# Patient Record
Sex: FEMALE | Race: BLACK OR AFRICAN AMERICAN | Employment: FULL TIME | URBAN - METROPOLITAN AREA
[De-identification: names, ages, dates, MRNs, and addresses within clinical notes are randomized per-mention and may not be internally consistent; named-entity substitution may affect disease eponyms.]

---

## 2017-01-26 ENCOUNTER — OFFICE VISIT (OUTPATIENT)
Dept: FAMILY MEDICINE CLINIC | Age: 37
End: 2017-01-26

## 2017-01-26 VITALS
TEMPERATURE: 98 F | HEIGHT: 68 IN | HEART RATE: 62 BPM | WEIGHT: 180 LBS | BODY MASS INDEX: 27.28 KG/M2 | OXYGEN SATURATION: 97 % | SYSTOLIC BLOOD PRESSURE: 102 MMHG | RESPIRATION RATE: 16 BRPM | DIASTOLIC BLOOD PRESSURE: 72 MMHG

## 2017-01-26 DIAGNOSIS — O03.9 MISCARRIAGE: Primary | ICD-10-CM

## 2017-01-26 RX ORDER — HYDROCODONE BITARTRATE AND ACETAMINOPHEN 5; 325 MG/1; MG/1
TABLET ORAL
COMMUNITY
Start: 2017-01-23 | End: 2017-12-04

## 2017-01-26 NOTE — LETTER
NOTIFICATION RETURN TO WORK / SCHOOL 
 
1/26/2017 2:31 PM 
 
Ms. iBb Hernandez [address] [place] [id number] To Whom It May Concern: 
 
Bib Hernandez is currently under the care of [address] [place] [address]. She will return to work/school on: 1/30/2017 If there are questions or concerns please have the patient contact our office. Sincerely, Trent Duran MD

## 2017-01-26 NOTE — PATIENT INSTRUCTIONS
Miscarriage: Care Instructions  Your Care Instructions    The loss of a pregnancy can be very hard. You may wonder why it happened or blame yourself. Miscarriages are common and are not caused by exercise, stress, or sex. Most happen because the fertilized egg in the uterus does not develop normally. There is no treatment that can stop a miscarriage. As long as you do not have heavy blood loss, fever, weakness, or other signs of infection, you can let a miscarriage follow its own course. This can take several days. Your body will recover over the next several weeks. Having a miscarriage does not mean you cannot have a normal pregnancy in the future. The doctor has checked you carefully, but problems can develop later. If you notice any problems or new symptoms, get medical treatment right away. Follow-up care is a key part of your treatment and safety. Be sure to make and go to all appointments, and call your doctor if you are having problems. It's also a good idea to know your test results and keep a list of the medicines you take. How can you care for yourself at home? · You will probably have some vaginal bleeding for 1 to 2 weeks. It may be similar to or slightly heavier than a normal period. The bleeding should get lighter after a week. Use pads instead of tampons. You may use tampons during your next period, which should start in 3 to 6 weeks. · Take an over-the-counter pain medicine, such as acetaminophen (Tylenol), ibuprofen (Advil, Motrin), or naproxen (Aleve) for cramps. Read and follow all instructions on the label. You may have cramps for several days after the miscarriage. · Do not take two or more pain medicines at the same time unless the doctor told you to. Many pain medicines have acetaminophen, which is Tylenol. Too much acetaminophen (Tylenol) can be harmful. · Use a clear container to save any tissue that you pass. Take it to your doctor's office as soon as you can.   · Do not have sex until the bleeding stops. · You may return to your normal activities if you feel well enough to do so. But you should avoid heavy exercise until the bleeding stops. · If you plan to get pregnant again, check with your doctor. Most doctors suggest waiting until you have had at least one normal period before you try to get pregnant. · If you do not want to get pregnant, ask your doctor about birth control. You can get pregnant again before your next period starts if you are not using birth control. · You may be low in iron because of blood loss. Eat a balanced diet that is high in iron and vitamin C. Foods rich in iron include red meat, shellfish, eggs, beans, and leafy green vegetables. Foods high in vitamin C include citrus fruits, tomatoes, and broccoli. Talk to your doctor about whether you need to take iron pills or a multivitamin. · The loss of a pregnancy can be very hard. You may wonder why it happened and blame yourself. Talking to family members, friends, a counselor, or your doctor may help you cope with your loss. When should you call for help? Call 911 anytime you think you may need emergency care. For example, call if:  · You have sudden, severe pain in your belly or pelvis. · You passed out (lost consciousness). · You have severe vaginal bleeding. Call your doctor now or seek immediate medical care if:  · You are dizzy or lightheaded, or you feel like you may faint. · You have new or increased pain in your belly or pelvis. · Your vaginal bleeding is getting worse. · You have increased pain in the vaginal area. · You have a fever. Watch closely for changes in your health, and be sure to contact your doctor if:  · You have new or worse vaginal discharge. · You do not get better as expected. Where can you learn more? Go to http://inocencia-[place]ank.info/. Enter E802 in the search box to learn more about \"Miscarriage: Care Instructions. \"  Current as of:  May 30, 2016  Content Version: 11.1  © 4183-7297 [employer]. Care instructions adapted under license by [employer] (which disclaims liability or warranty for this information). If you have questions about a medical condition or this instruction, always ask your healthcare professional. [address] any warranty or liability for your use of this information.

## 2017-01-26 NOTE — MR AVS SNAPSHOT
Visit Information Date & Time Provider Department Dept. Phone Encounter #  
 1/26/2017  2:00 PM [facility], [address] [id number] Upcoming Health Maintenance Date Due DTaP/Tdap/Td series (1 - Tdap) 6/4/2003 PAP AKA CERVICAL CYTOLOGY 6/4/2003 INFLUENZA AGE 9 TO ADULT 8/1/2016 Allergies as of 1/26/2017  Review Complete On: 1/26/2017 By: Keith Gonsalez LPN No Known Allergies Current Immunizations  Reviewed on 10/11/2010 Name Date Influenza Vaccine Split 10/11/2010 Not reviewed this visit You Were Diagnosed With   
  
 Codes Comments Miscarriage    -  Primary ICD-10-CM: O36.9 ICD-9-CM: 634.90 Vitals BP Pulse Temp Resp Height(growth percentile) Weight(growth percentile) 102/72 (BP 1 Location: Left arm, BP Patient Position: Sitting) 62 98 °F (36.7 °C) (Oral) 16 5' 8\" (1.727 m) 180 lb (81.6 kg) LMP SpO2 BMI OB Status Smoking Status 11/21/2016 97% 27.37 kg/m2 Having regular periods Never Smoker BMI and BSA Data Body Mass Index Body Surface Area  
 27.37 kg/m 2 1.98 m 2 Preferred Pharmacy Pharmacy Name Phone [facility] [place] [phone] Your Updated Medication List  
  
   
This list is accurate as of: 1/26/17  2:39 PM.  Always use your most recent med list.  
  
  
  
  
 HYDROcodone-acetaminophen 5-325 mg per tablet Commonly known as:  Christianson Goldie Take 1 Tab by Mouth Every 4 Hours As Needed. ibuprofen 800 mg tablet Commonly known as:  MOTRIN Take 1 Tab by mouth every eight (8) hours as needed for Pain. To-Do List   
 01/26/2017 Lab:  TOTAL HCG, QT.   
  
 02/10/2017 Imaging:  US TRANSVAGINAL Patient Instructions Miscarriage: Care Instructions Your Care Instructions The loss of a pregnancy can be very hard.  You may wonder why it happened or blame yourself. Miscarriages are common and are not caused by exercise, stress, or sex. Most happen because the fertilized egg in the uterus does not develop normally. There is no treatment that can stop a miscarriage. As long as you do not have heavy blood loss, fever, weakness, or other signs of infection, you can let a miscarriage follow its own course. This can take several days. Your body will recover over the next several weeks. Having a miscarriage does not mean you cannot have a normal pregnancy in the future. The doctor has checked you carefully, but problems can develop later. If you notice any problems or new symptoms, get medical treatment right away. Follow-up care is a key part of your treatment and safety. Be sure to make and go to all appointments, and call your doctor if you are having problems. It's also a good idea to know your test results and keep a list of the medicines you take. How can you care for yourself at home? · You will probably have some vaginal bleeding for 1 to 2 weeks. It may be similar to or slightly heavier than a normal period. The bleeding should get lighter after a week. Use pads instead of tampons. You may use tampons during your next period, which should start in 3 to 6 weeks. · Take an over-the-counter pain medicine, such as acetaminophen (Tylenol), ibuprofen (Advil, Motrin), or naproxen (Aleve) for cramps. Read and follow all instructions on the label. You may have cramps for several days after the miscarriage. · Do not take two or more pain medicines at the same time unless the doctor told you to. Many pain medicines have acetaminophen, which is Tylenol. Too much acetaminophen (Tylenol) can be harmful. · Use a clear container to save any tissue that you pass. Take it to your doctor's office as soon as you can. · Do not have sex until the bleeding stops.  
· You may return to your normal activities if you feel well enough to do so. But you should avoid heavy exercise until the bleeding stops. · If you plan to get pregnant again, check with your doctor. Most doctors suggest waiting until you have had at least one normal period before you try to get pregnant. · If you do not want to get pregnant, ask your doctor about birth control. You can get pregnant again before your next period starts if you are not using birth control. · You may be low in iron because of blood loss. Eat a balanced diet that is high in iron and vitamin C. Foods rich in iron include red meat, shellfish, eggs, beans, and leafy green vegetables. Foods high in vitamin C include citrus fruits, tomatoes, and broccoli. Talk to your doctor about whether you need to take iron pills or a multivitamin. · The loss of a pregnancy can be very hard. You may wonder why it happened and blame yourself. Talking to family members, friends, a counselor, or your doctor may help you cope with your loss. When should you call for help? Call 911 anytime you think you may need emergency care. For example, call if: 
· You have sudden, severe pain in your belly or pelvis. · You passed out (lost consciousness). · You have severe vaginal bleeding. Call your doctor now or seek immediate medical care if: 
· You are dizzy or lightheaded, or you feel like you may faint. · You have new or increased pain in your belly or pelvis. · Your vaginal bleeding is getting worse. · You have increased pain in the vaginal area. · You have a fever. Watch closely for changes in your health, and be sure to contact your doctor if: 
· You have new or worse vaginal discharge. · You do not get better as expected. Where can you learn more? Go to http://inocencia-[place]ank.info/. Enter E802 in the search box to learn more about \"Miscarriage: Care Instructions. \" Current as of: May 30, 2016 Content Version: 11.1 © 9125-2407 [employer].  Care instructions adapted under license by [address] (which disclaims liability or warranty for this information). If you have questions about a medical condition or this instruction, always ask your healthcare professional. [address] any warranty or liability for your use of this information. Introducing [facility]! [employer] introduces StayNTouch patient portal. Now you can access parts of your medical record, email your doctor's office, and request medication refills online. 1. In your internet browser, go to [email or web address] 2. Click on the First Time User? Click Here link in the Sign In box. You will see the New Member Sign Up page. 3. Enter your StayNTouch Access Code exactly as it appears below. You will not need to use this code after youve completed the sign-up process. If you do not sign up before the expiration date, you must request a new code. · StayNTouch Access Code: [email or web address] Expires: 4/26/2017  2:39 PM 
 
4. Enter the last four digits of your Social Security Number (xxxx) and Date of Birth (mm/dd/yyyy) as indicated and click Submit. You will be taken to the next sign-up page. 5. Create a StayNTouch ID. This will be your StayNTouch login ID and cannot be changed, so think of one that is secure and easy to remember. 6. Create a StayNTouch password. You can change your password at any time. 7. Enter your Password Reset Question and Answer. This can be used at a later time if you forget your password. 8. Enter your e-mail address. You will receive e-mail notification when new information is available in [address] 9. Click Sign Up. You can now view and download portions of your medical record. 10. Click the Download Summary menu link to download a portable copy of your medical information. If you have questions, please visit the Frequently Asked Questions section of the StayNTouch website.  Remember, StayNTouch is NOT to be used for urgent needs. For medical emergencies, dial 911. Now available from your iPhone and Android! Please provide this summary of care documentation to your next provider. Your primary care clinician is listed as [address]. If you have any questions after today's visit, please call [phone].

## 2017-01-26 NOTE — PROGRESS NOTES
1. Have you been to the ER, urgent care clinic since your last visit? Hospitalized since your last visit? Yes Where: [facility] emergency room    2. Have you seen or consulted any other health care providers outside of the [address] [place] [address] since your last visit? Include any pap smears or colon screening.  No

## 2017-01-26 NOTE — PROGRESS NOTES
HISTORY OF PRESENT ILLNESS  Valerie Timmons is a 31 y.o. female. HPI  Patient is here today for follow up on: Miscarriage    Miscarriage: Pt found out about 2 weeks ago she was pregnant. She is now G 10;  She developed some menstrual like cramping and some vaginal spotting on last Saturday and presented to the ED. She was found to have what appeared to be a dropping Quantitative Bhcg . On US there was a sac seen but no fetal pole. A follow up US is advised. Today she feels well but feels some lower abdominal pain /pelvic cramping; She wants to return to work. Needs a work note; She does have more cramping in the evening; She is unable to return to work with restrictions. She has pain meds she uses for her abdominal cramping. This helps    PMH,  Meds, Allergies, Family History, Social history reviewed        Current Outpatient Prescriptions:     HYDROcodone-acetaminophen (NORCO) 5-325 mg per tablet, Take 1 Tab by Mouth Every 4 Hours As Needed. , Disp: , Rfl:     ibuprofen (MOTRIN) 800 mg tablet, Take 1 Tab by mouth every eight (8) hours as needed for Pain., Disp: 30 Tab, Rfl: 0    Review of Systems   Constitutional: Negative for chills and fever. Gastrointestinal: Positive for abdominal pain. Negative for diarrhea, nausea and vomiting. Physical Exam   Constitutional: She appears well-developed and well-nourished. No distress. Cardiovascular: Normal rate and regular rhythm. Exam reveals no gallop and no friction rub. No murmur heard. Pulmonary/Chest: Breath sounds normal. No respiratory distress. She has no wheezes. She has no rales. Abdominal: Bowel sounds are normal. She exhibits no distension and no mass. There is tenderness (in lower abdomen). There is no rebound and no guarding. Vitals reviewed. ASSESSMENT and PLAN    ICD-10-CM ICD-9-CM    1.  Miscarriage O03.9 634.90 TOTAL HCG, QT.      US TRANSVAGINAL       As above new  , reviewed pts US with her from ED visit on 1/21/2017  Repeat qt HCG to see if value is still decreasing  Recheck US in 2-3 weeks from previous [address]  Work note with anticipated return on Monday. If still hurting will plan to extend work note  Follow-up Disposition:  Return if symptoms worsen or fail to improve. An After Visit Summary was printed and given to the patient.

## 2017-01-27 LAB — HCG, BETA, HCGTLT: 140 MIU/ML

## 2017-12-04 ENCOUNTER — HOSPITAL ENCOUNTER (EMERGENCY)
Age: 37
Discharge: HOME | End: 2017-12-04
Attending: EMERGENCY MEDICINE
Payer: MEDICAID

## 2017-12-04 VITALS
OXYGEN SATURATION: 100 % | TEMPERATURE: 98.2 F | RESPIRATION RATE: 12 BRPM | HEART RATE: 81 BPM | SYSTOLIC BLOOD PRESSURE: 120 MMHG | BODY MASS INDEX: 23.87 KG/M2 | WEIGHT: 157 LBS | DIASTOLIC BLOOD PRESSURE: 76 MMHG
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
## 2017-12-04 PROCEDURE — 99282 EMERGENCY DEPT VISIT SF MDM: CPT

## 2017-12-04 RX ORDER — NAPROXEN 500 MG/1
500 TABLET ORAL 2 TIMES DAILY WITH MEALS
Qty: 20 TAB | Refills: 0 | Status: SHIPPED | OUTPATIENT
Start: 2017-12-04 | End: 2017-12-14

## 2017-12-05 NOTE — ED PROVIDER NOTES
HPI Comments: Rafita Rodriguez is a 30 y.o. female was at work and was stocking heavy items and after her shift noticed the arm was heavy. No specific trauma. Denies any numbness or tingling or pain. Has tried nothing for it; came straight from work. Feels she may have pulled something. Pt denies any fevers or chills, headache, dizziness or light headedness, ENT issues, CP or discomfort, SOB, cough, n/v/d/c, abd pain, back pain, diaphoresis, melena/hematochezia, dysuria, hematuria, frequency, focal weakness/numbness/tingling, or rash. Patient has no other complaints at this time. Patient is a 30 y.o. female presenting with numbness. The history is provided by the patient. Numbness   Pertinent negatives include no speech difficulty. Pertinent negatives include no shortness of breath, no chest pain, no vomiting, no headaches and no nausea. History reviewed. No pertinent past medical history. History reviewed. No pertinent surgical history. Family History:   Problem Relation Age of Onset    Hypertension Maternal Grandmother     Cancer Maternal Grandmother      breast cancer, also great grandmother and great aunts    Diabetes Sister 27    Hypertension Sister 34       Social History     Social History    Marital status: SINGLE     Spouse name: N/A    Number of children: N/A    Years of education: N/A     Occupational History    fast food services      Social History Main Topics    Smoking status: Never Smoker    Smokeless tobacco: Never Used    Alcohol use Yes      Comment: very rare    Drug use: No    Sexual activity: Yes     Partners: Male     Birth control/ protection: Condom      Comment: single     Other Topics Concern    Caffeine Concern Yes     2 liters a day    Exercise Yes     cardio three times   [place] Yes     Social History Narrative         ALLERGIES: Review of patient's allergies indicates no known allergies.     Review of Systems   Constitutional: Negative for chills and fever. HENT: Negative for congestion, rhinorrhea and sore throat. Respiratory: Negative for cough and shortness of breath. Cardiovascular: Negative for chest pain. Gastrointestinal: Negative for abdominal pain, blood in stool, constipation, diarrhea, nausea and vomiting. Genitourinary: Negative for dysuria, frequency and hematuria. Musculoskeletal: Negative for back pain and myalgias. Skin: Negative for rash and wound. Neurological: Negative. Negative for dizziness, facial asymmetry, speech difficulty, weakness and headaches. Vague sensation of heaviness to left arm        Vitals:    12/04/17 2129   BP: 120/76   Pulse: 81   Resp: 12   Temp: 98.2 °F (36.8 °C)   SpO2: 100%   Weight: 71.2 kg (157 lb)            Physical Exam   Constitutional: She is oriented to person, place, and time. She appears well-developed and well-nourished. No distress. HENT:   Head: Normocephalic and atraumatic. Eyes: Conjunctivae are normal.   Neck: Normal range of motion. Neck supple. Cardiovascular: Normal rate, regular rhythm and normal heart sounds. Pulmonary/Chest: Effort normal and breath sounds normal. No respiratory distress. She exhibits no tenderness. Abdominal: Soft. Bowel sounds are normal. She exhibits no distension. There is no tenderness. There is no rebound and no guarding. Musculoskeletal: She exhibits no edema or deformity. Left shoulder: Normal. She exhibits normal range of motion, no tenderness, no bony tenderness, no swelling, no crepitus, no deformity, no laceration, no pain, no spasm, normal pulse and normal strength. Neurological: She is alert and oriented to person, place, and time. She has normal reflexes. Skin: Skin is warm and dry. She is not diaphoretic. Psychiatric: She has a normal mood and affect. Nursing note and vitals reviewed.        MDM  Number of Diagnoses or Management Options  Diagnosis management comments: Differential: Fracture, musculoskeletal pain, dislocation, contusion, overuse injury     Plan: Will send home with pain medication and suggest rest and ice 10 minutes a time up to 4 times a day. Patient agrees with the plan and management and states all questions have been thoroughly answered and there are no more remaining questions.        Risk of Complications, Morbidity, and/or Mortality  Presenting problems: low  Diagnostic procedures: low  Management options: low      ED Course       Procedures

## 2017-12-07 ENCOUNTER — HOSPITAL ENCOUNTER (EMERGENCY)
Age: 37
Discharge: HOME | End: 2017-12-08
Attending: EMERGENCY MEDICINE
Payer: MEDICAID

## 2017-12-07 DIAGNOSIS — Z32.01 PREGNANCY TEST PERFORMED, PREGNANCY CONFIRMED: ICD-10-CM

## 2017-12-07 DIAGNOSIS — M54.5 ACUTE RIGHT-SIDED LOW BACK PAIN, WITH SCIATICA PRESENCE UNSPECIFIED: Primary | ICD-10-CM

## 2017-12-07 LAB — HCG UR QL: POSITIVE

## 2017-12-07 PROCEDURE — 99284 EMERGENCY DEPT VISIT MOD MDM: CPT

## 2017-12-07 PROCEDURE — 81001 URINALYSIS AUTO W/SCOPE: CPT | Performed by: EMERGENCY MEDICINE

## 2017-12-07 PROCEDURE — 81025 URINE PREGNANCY TEST: CPT | Performed by: EMERGENCY MEDICINE

## 2017-12-07 PROCEDURE — 96360 HYDRATION IV INFUSION INIT: CPT

## 2017-12-07 PROCEDURE — 96361 HYDRATE IV INFUSION ADD-ON: CPT

## 2017-12-07 PROCEDURE — 74011250637 HC RX REV CODE- 250/637: Performed by: EMERGENCY MEDICINE

## 2017-12-07 RX ORDER — IBUPROFEN 400 MG/1
800 TABLET ORAL
Status: COMPLETED | OUTPATIENT
Start: 2017-12-07 | End: 2017-12-07

## 2017-12-07 RX ORDER — ONDANSETRON 4 MG/1
4 TABLET, ORALLY DISINTEGRATING ORAL
Status: COMPLETED | OUTPATIENT
Start: 2017-12-07 | End: 2017-12-07

## 2017-12-07 RX ADMIN — ONDANSETRON 4 MG: 4 TABLET, ORALLY DISINTEGRATING ORAL at 23:45

## 2017-12-07 RX ADMIN — IBUPROFEN 800 MG: 400 TABLET ORAL at 23:45

## 2017-12-08 ENCOUNTER — APPOINTMENT (OUTPATIENT)
Dept: GENERAL RADIOLOGY | Age: 37
End: 2017-12-08
Attending: EMERGENCY MEDICINE
Payer: MEDICAID

## 2017-12-08 ENCOUNTER — APPOINTMENT (OUTPATIENT)
Dept: ULTRASOUND IMAGING | Age: 37
End: 2017-12-08
Attending: EMERGENCY MEDICINE
Payer: MEDICAID

## 2017-12-08 VITALS
HEIGHT: 68 IN | RESPIRATION RATE: 14 BRPM | TEMPERATURE: 97.9 F | SYSTOLIC BLOOD PRESSURE: 112 MMHG | WEIGHT: 157 LBS | OXYGEN SATURATION: 100 % | BODY MASS INDEX: 23.79 KG/M2 | HEART RATE: 75 BPM | DIASTOLIC BLOOD PRESSURE: 68 MMHG

## 2017-12-08 LAB
ALBUMIN SERPL-MCNC: 4 G/DL (ref 3.4–5)
ALBUMIN/GLOB SERPL: 1.1 {RATIO} (ref 0.8–1.7)
ALP SERPL-CCNC: 54 U/L (ref 45–117)
ALT SERPL-CCNC: 20 U/L (ref 13–56)
ANION GAP SERPL CALC-SCNC: 11 MMOL/L (ref 3–18)
APPEARANCE UR: CLEAR
AST SERPL-CCNC: 20 U/L (ref 15–37)
BACTERIA URNS QL MICRO: ABNORMAL /HPF
BASOPHILS # BLD: 0 K/UL (ref 0–0.06)
BASOPHILS NFR BLD: 0 % (ref 0–2)
BILIRUB DIRECT SERPL-MCNC: 0.1 MG/DL (ref 0–0.2)
BILIRUB SERPL-MCNC: 0.1 MG/DL (ref 0.2–1)
BILIRUB UR QL: NEGATIVE
BUN SERPL-MCNC: 7 MG/DL (ref 7–18)
BUN/CREAT SERPL: 9 (ref 12–20)
CALCIUM SERPL-MCNC: 9.1 MG/DL (ref 8.5–10.1)
CHLORIDE SERPL-SCNC: 100 MMOL/L (ref 100–108)
CO2 SERPL-SCNC: 25 MMOL/L (ref 21–32)
COLOR UR: YELLOW
CREAT SERPL-MCNC: 0.77 MG/DL (ref 0.6–1.3)
DIFFERENTIAL METHOD BLD: ABNORMAL
EOSINOPHIL # BLD: 0.4 K/UL (ref 0–0.4)
EOSINOPHIL NFR BLD: 4 % (ref 0–5)
EPITH CASTS URNS QL MICRO: ABNORMAL /LPF (ref 0–5)
ERYTHROCYTE [DISTWIDTH] IN BLOOD BY AUTOMATED COUNT: 12.6 % (ref 11.6–14.5)
GLOBULIN SER CALC-MCNC: 3.8 G/DL (ref 2–4)
GLUCOSE SERPL-MCNC: 87 MG/DL (ref 74–99)
GLUCOSE UR STRIP.AUTO-MCNC: NEGATIVE MG/DL
HCG SERPL-ACNC: ABNORMAL MIU/ML (ref 1–6)
HCT VFR BLD AUTO: 36 % (ref 35–45)
HGB BLD-MCNC: 12.2 G/DL (ref 12–16)
HGB UR QL STRIP: NEGATIVE
KETONES UR QL STRIP.AUTO: NEGATIVE MG/DL
LEUKOCYTE ESTERASE UR QL STRIP.AUTO: ABNORMAL
LIPASE SERPL-CCNC: 90 U/L (ref 73–393)
LYMPHOCYTES # BLD: 3.9 K/UL (ref 0.9–3.6)
LYMPHOCYTES NFR BLD: 36 % (ref 21–52)
MCH RBC QN AUTO: 28 PG (ref 24–34)
MCHC RBC AUTO-ENTMCNC: 33.9 G/DL (ref 31–37)
MCV RBC AUTO: 82.6 FL (ref 74–97)
MONOCYTES # BLD: 0.7 K/UL (ref 0.05–1.2)
MONOCYTES NFR BLD: 7 % (ref 3–10)
NEUTS SEG # BLD: 5.9 K/UL (ref 1.8–8)
NEUTS SEG NFR BLD: 53 % (ref 40–73)
NITRITE UR QL STRIP.AUTO: NEGATIVE
PH UR STRIP: 6 [PH] (ref 5–8)
PLATELET # BLD AUTO: 264 K/UL (ref 135–420)
PMV BLD AUTO: 10.3 FL (ref 9.2–11.8)
POTASSIUM SERPL-SCNC: 3.4 MMOL/L (ref 3.5–5.5)
PROT SERPL-MCNC: 7.8 G/DL (ref 6.4–8.2)
PROT UR STRIP-MCNC: NEGATIVE MG/DL
RBC # BLD AUTO: 4.36 M/UL (ref 4.2–5.3)
RBC #/AREA URNS HPF: NEGATIVE /HPF (ref 0–5)
SODIUM SERPL-SCNC: 136 MMOL/L (ref 136–145)
SP GR UR REFRACTOMETRY: 1.01 (ref 1–1.03)
UROBILINOGEN UR QL STRIP.AUTO: 0.2 EU/DL (ref 0.2–1)
WBC # BLD AUTO: 10.9 K/UL (ref 4.6–13.2)
WBC URNS QL MICRO: ABNORMAL /HPF (ref 0–4)

## 2017-12-08 PROCEDURE — 74011250636 HC RX REV CODE- 250/636: Performed by: EMERGENCY MEDICINE

## 2017-12-08 PROCEDURE — 76817 TRANSVAGINAL US OBSTETRIC: CPT

## 2017-12-08 PROCEDURE — 84702 CHORIONIC GONADOTROPIN TEST: CPT | Performed by: EMERGENCY MEDICINE

## 2017-12-08 PROCEDURE — 74011250637 HC RX REV CODE- 250/637: Performed by: EMERGENCY MEDICINE

## 2017-12-08 PROCEDURE — 80048 BASIC METABOLIC PNL TOTAL CA: CPT | Performed by: EMERGENCY MEDICINE

## 2017-12-08 PROCEDURE — 80076 HEPATIC FUNCTION PANEL: CPT | Performed by: EMERGENCY MEDICINE

## 2017-12-08 PROCEDURE — 83690 ASSAY OF LIPASE: CPT | Performed by: EMERGENCY MEDICINE

## 2017-12-08 PROCEDURE — 85025 COMPLETE CBC W/AUTO DIFF WBC: CPT | Performed by: EMERGENCY MEDICINE

## 2017-12-08 RX ORDER — SODIUM CHLORIDE 9 MG/ML
1000 INJECTION, SOLUTION INTRAVENOUS ONCE
Status: COMPLETED | OUTPATIENT
Start: 2017-12-08 | End: 2017-12-08

## 2017-12-08 RX ORDER — ACETAMINOPHEN 325 MG/1
650 TABLET ORAL
Status: COMPLETED | OUTPATIENT
Start: 2017-12-08 | End: 2017-12-08

## 2017-12-08 RX ADMIN — SODIUM CHLORIDE 1000 ML: 900 INJECTION, SOLUTION INTRAVENOUS at 00:24

## 2017-12-08 RX ADMIN — ACETAMINOPHEN 650 MG: 325 TABLET ORAL at 00:24

## 2017-12-08 NOTE — DISCHARGE INSTRUCTIONS
Return for pain, bleeding, shortness of breath, vomiting, decreased fluid intake, weakness, numbness, dizziness, or any change or concerns. Back Pain: Care Instructions  Your Care Instructions    Back pain has many possible causes. It is often related to problems with muscles and ligaments of the back. It may also be related to problems with the nerves, discs, or bones of the back. Moving, lifting, standing, sitting, or sleeping in an awkward way can strain the back. Sometimes you don't notice the injury until later. Arthritis is another common cause of back pain. Although it may hurt a lot, back pain usually improves on its own within several weeks. Most people recover in 12 weeks or less. Using good home treatment and being careful not to stress your back can help you feel better sooner. Follow-up care is a key part of your treatment and safety. Be sure to make and go to all appointments, and call your doctor if you are having problems. It's also a good idea to know your test results and keep a list of the medicines you take. How can you care for yourself at home? · Sit or lie in positions that are most comfortable and reduce your pain. Try one of these positions when you lie down:  ¨ Lie on your back with your knees bent and supported by large pillows. ¨ Lie on the floor with your legs on the seat of a sofa or chair. Sushil Left on your side with your knees and hips bent and a pillow between your legs. ¨ Lie on your stomach if it does not make pain worse. · Do not sit up in bed, and avoid soft couches and twisted positions. Bed rest can help relieve pain at first, but it delays healing. Avoid bed rest after the first day of back pain. · Change positions every 30 minutes. If you must sit for long periods of time, take breaks from sitting. Get up and walk around, or lie in a comfortable position. · Try using a heating pad on a low or medium setting for 15 to 20 minutes every 2 or 3 hours.  Try a warm shower in place of one session with the heating pad. · You can also try an ice pack for 10 to 15 minutes every 2 to 3 hours. Put a thin cloth between the ice pack and your skin. · Take pain medicines exactly as directed. ¨ If the doctor gave you a prescription medicine for pain, take it as prescribed. ¨ If you are not taking a prescription pain medicine, ask your doctor if you can take an over-the-counter medicine. · Take short walks several times a day. You can start with 5 to 10 minutes, 3 or 4 times a day, and work up to longer walks. Walk on level surfaces and avoid hills and stairs until your back is better. · Return to work and other activities as soon as you can. Continued rest without activity is usually not good for your back. · To prevent future back pain, do exercises to stretch and strengthen your back and stomach. Learn how to use good posture, safe lifting techniques, and proper body mechanics. When should you call for help? Call your doctor now or seek immediate medical care if:  ? · You have new or worsening numbness in your legs. ? · You have new or worsening weakness in your legs. (This could make it hard to stand up.)   ? · You lose control of your bladder or bowels. ? Watch closely for changes in your health, and be sure to contact your doctor if:  ? · Your pain gets worse. ? · You are not getting better after 2 weeks. Where can you learn more? Go to http://inocencia-hank.info/. Enter G829 in the search box to learn more about \"Back Pain: Care Instructions. \"  Current as of: March 21, 2017  Content Version: 11.4  © 0186-1661 [employer]. Care instructions adapted under license by [employer] (which disclaims liability or warranty for this information).  If you have questions about a medical condition or this instruction, always ask your healthcare professional. [address] any warranty or liability for your use of this information. Backache During Pregnancy: Care Instructions  Your Care Instructions    Back pain has many possible causes. It is often caused by problems with muscles and ligaments in your back. The extra weight during pregnancy can put stress on your back. Moving, lifting, standing, sitting, or sleeping in an awkward way also can strain your back. Back pain can also be a sign of labor. Although it may hurt a lot, back pain often improves on its own. Use good home treatment, and take care not to stress your back. Follow-up care is a key part of your treatment and safety. Be sure to make and go to all appointments, and call your doctor if you are having problems. It's also a good idea to know your test results and keep a list of the medicines you take. How can you care for yourself at home? · Ask your doctor about taking acetaminophen (Tylenol) for pain. Do not take aspirin, ibuprofen (Advil, Motrin), or naproxen (Aleve). · Do not take two or more pain medicines at the same time unless the doctor told you to. Many pain medicines have acetaminophen, which is Tylenol. Too much acetaminophen (Tylenol) can be harmful. · Lie on your side with your knees and hips bent and a pillow between your legs. This reduces stress on your back. · Put ice or cold packs on your back for 10 to 20 minutes at a time, several times a day. Put a thin cloth between the ice and your skin. · Warm baths may also help reduce pain. · Change positions every 30 minutes. Take breaks if you must sit for a long time. Get up and walk around. · Ask your doctor about how much exercise you can do. You may feel better taking short walks or doing gentle movements and stretching in a swimming pool. · Ask your doctor about exercises to stretch and strengthen your back. When should you call for help? Call your doctor now or seek immediate medical care if:  ? · You think you are in labor.    ? · You have new numbness in your buttocks, genital or rectal areas, or legs. ? · You have a new loss of bowel or bladder control. ? Watch closely for changes in your health, and be sure to contact your doctor if:  ? · You do not get better as expected. Where can you learn more? Go to http://inocencia-hank.info/. Enter Z153 in the search box to learn more about \"Backache During Pregnancy: Care Instructions. \"  Current as of: March 16, 2017  Content Version: 11.4  © 1356-2383 [employer]. Care instructions adapted under license by [employer] (which disclaims liability or warranty for this information). If you have questions about a medical condition or this instruction, always ask your healthcare professional. [address] any warranty or liability for your use of this information.

## 2017-12-08 NOTE — ED PROVIDER NOTES
EMERGENCY DEPARTMENT HISTORY AND PHYSICAL EXAM    11:16 PM      Date: 12/7/2017  Patient Name: Latisha Bridges    History of Presenting Illness     Chief Complaint   Patient presents with    Urinary Frequency    Flank Pain         History Provided By: Patient    Chief Complaint: flank pain  Duration:  Days  Timing:  Acute, Constant and Worsening  Location: right flank. Quality: Aching  Severity: Mild  Modifying Factors: none  Associated Symptoms: increased urinary frequency and nausea. Additional History (Context): Latisha Bridges is a 30 y.o. female with kidney infection who presents with right sided flank pain and increased urinary frequency since yesterday. The pt reports as she started working to day her pain began to worsen and became constant. The pt states she also started to feel nauseated that would be exacerbated when she bent down; pt had no episodes of vomiting. The pt reports she has had a kidney infection in the past and this pain feels similar. The pt states she has not taken medications for her pain. The pt also reports some urinary frequency that started today. The pt denies fever, pregnancy, vaginal discharge, hematuria, and dysuria. The pt had no other complaints or concerns in the ED. PCP: Kj Acosta MD    Current Outpatient Prescriptions   Medication Sig Dispense Refill    naproxen (NAPROSYN) 500 mg tablet Take 1 Tab by mouth two (2) times daily (with meals) for 10 days. 20 Tab 0       Past History     Past Medical History:  History reviewed. No pertinent past medical history. Past Surgical History:  History reviewed. No pertinent surgical history.     Family History:  Family History   Problem Relation Age of Onset    Hypertension Maternal Grandmother     Cancer Maternal Grandmother      breast cancer, also great grandmother and great aunts    Diabetes Sister 27    Hypertension Sister 34       Social History:  Social History   Substance Use Topics    Smoking status: Never Smoker    Smokeless tobacco: Never Used    Alcohol use Yes      Comment: very rare       Allergies:  No Known Allergies      Review of Systems       Review of Systems   Constitutional: Negative for chills, fatigue and fever. HENT: Negative. Negative for sore throat. Eyes: Negative. Respiratory: Negative for cough and shortness of breath. Cardiovascular: Negative for chest pain and palpitations. Gastrointestinal: Negative for abdominal pain, nausea and vomiting. Genitourinary: Positive for flank pain (right) and frequency (urinary). Negative for dysuria and vaginal discharge. Skin: Negative. Neurological: Negative for dizziness, weakness, light-headedness and headaches. Psychiatric/Behavioral: Negative. All other systems reviewed and are negative. Physical Exam     Visit Vitals    /68    Pulse 75    Temp 97.9 °F (36.6 °C)    Resp 14    Ht 5' 8\" (1.727 m)    Wt 71.2 kg (157 lb)    LMP 11/17/2017    SpO2 100%    BMI 23.87 kg/m2         Physical Exam   Constitutional: She is oriented to person, place, and time. She appears well-developed. HENT:   Head: Normocephalic. Mouth/Throat: Oropharynx is clear and moist.   Eyes: Pupils are equal, round, and reactive to light. Neck: Normal range of motion. Cardiovascular: Normal rate and normal heart sounds. No murmur heard. Pulmonary/Chest: Effort normal. She has no wheezes. She has no rales. Abdominal: Soft. There is no tenderness. Musculoskeletal: Normal range of motion. She exhibits no edema. The pt shows mild right para lumbar tenderness and spasm. Neurological: She is alert and oriented to person, place, and time. Skin: Skin is warm and dry. Nursing note and vitals reviewed. Diagnostic Study Results     Labs -  No results found for this or any previous visit (from the past 12 hour(s)).     Radiologic Studies -   US UTS TRANSVAGINAL OB   Final Result            Medical Decision Making   I am the first provider for this patient. I reviewed the vital signs, available nursing notes, past medical history, past surgical history, family history and social history. Vital Signs-Reviewed the patient's vital signs. Pulse Oximetry Analysis -  100% on room air     ED Course: Progress Notes, Reevaluation, and Consults:  12:11 AM Pt has positive pregnancy test in the ED. The pt reports G 11 /P 8/ 2 abortions/ 1 miscarriage. 2:42 AM The pt states that she is feeling better. The pt denies the pelvic exam. The pt states she wants to go home    Provider Notes (Medical Decision Making): no sx's after tx, declines further w/u or ed obs. abd soft and non-tender, no complaints. Detailed ret inst given. No emc. Pt verbalizes detailed ret inst given. Diagnosis     Clinical Impression:   1. Acute right-sided low back pain, with sciatica presence unspecified    2. Pregnancy test performed, pregnancy confirmed        Disposition: home    Follow-up Information     Follow up With Details Comments Contact Info    Ember Sweet MD Schedule an appointment as soon as possible for a visit in 1 day  [address]  [address]      Jared Yee MD Schedule an appointment as soon as possible for a visit in 2 days  [facility]4  [address] [facility]  [phone]             Discharge Medication List as of 12/8/2017  2:44 AM      CONTINUE these medications which have NOT CHANGED    Details   naproxen (NAPROSYN) 500 mg tablet Take 1 Tab by mouth two (2) times daily (with meals) for 10 days. , Print, Disp-20 Tab, R-0           _______________________________    Attestations:  Julio Cesar Key acting as a scribe for and in the presence of Lam Márquez MD      December 07, 2017 at 11:18 PM       Provider Attestation:      I personally performed the services described in the documentation, reviewed the documentation, as recorded by the scribe in my presence, and it accurately and completely records my words and actions.  December 07, 2017 at 11:18 PM - Miryam Yoder MD    _______________________________

## 2017-12-27 ENCOUNTER — ROUTINE PRENATAL (OUTPATIENT)
Dept: OBGYN CLINIC | Age: 37
End: 2017-12-27

## 2017-12-27 VITALS
DIASTOLIC BLOOD PRESSURE: 63 MMHG | OXYGEN SATURATION: 95 % | TEMPERATURE: 97 F | RESPIRATION RATE: 14 BRPM | WEIGHT: 161 LBS | HEIGHT: 68 IN | SYSTOLIC BLOOD PRESSURE: 107 MMHG | BODY MASS INDEX: 24.4 KG/M2 | HEART RATE: 66 BPM

## 2017-12-27 DIAGNOSIS — Z3A.09 9 WEEKS GESTATION OF PREGNANCY: ICD-10-CM

## 2017-12-27 DIAGNOSIS — Z3A.09 9 WEEKS GESTATION OF PREGNANCY: Primary | ICD-10-CM

## 2017-12-27 LAB
CYSTIC FIBROSIS, EXTERNAL: NEGATIVE
HBSAG, EXTERNAL: NEGATIVE
HCT, EXTERNAL: 36.1
HGB, EXTERNAL: 12.2
HIV, EXTERNAL: NORMAL
PLATELET CNT,   EXTERNAL: 326
RPR, EXTERNAL: <1.1
RUBELLA, EXTERNAL: NORMAL
T. PALLIDUM, EXTERNAL: REACTIVE
VARICELLA, EXTERNAL: NORMAL

## 2017-12-27 NOTE — PROGRESS NOTES
ROOM # [id number] [facility] presents today for   Chief Complaint   Patient presents with    Routine Prenatal Visit     initial visit     Carl Suarez preferred language for health care discussion is english/other. Is someone accompanying this pt? no        Coordination of Care:  1. Have you been to the ER, urgent care clinic since your last visit? Hospitalized since your last visit? Yes [facility], for abd pain and dizziness    2. Have you seen or consulted any other health care providers outside of the [address] [place] [address] since your last visit? Include any pap smears or colon screening.  no

## 2017-12-27 NOTE — PATIENT INSTRUCTIONS
Weeks 6 to 10 of Your Pregnancy: Care Instructions  Your Care Instructions    Congratulations on your pregnancy. This is an exciting and important time for you. During the first 6 to 10 weeks of your pregnancy, your body goes through many changes. Your baby grows very fast, even though you cannot feel it yet. You may start to notice that you feel different, both in your body and your emotions. Because each woman's pregnancy is unique, there is no right way to feel. You may feel the healthiest you have ever been, or you may feel tired or sick to your stomach (\"morning sickness\"). These early weeks are a time to make healthy choices and to eat the best foods for you and your baby. This care sheet will give you some ideas. This is also a good time to think about birth defects testing. These are tests done during pregnancy to look for possible problems with the baby. First trimester tests for birth defects can be done between 8 and 17 weeks of pregnancy, depending on the test. Talk with your doctor about what kinds of tests are available. Follow-up care is a key part of your treatment and safety. Be sure to make and go to all appointments, and call your doctor if you are having problems. It's also a good idea to know your test results and keep a list of the medicines you take. How can you care for yourself at home? Eat well  · Eat at least 3 meals and 2 healthy snacks every day. Eat fresh, whole foods, including:  ¨ 7 or more servings of bread, tortillas, cereal, rice, pasta, or oatmeal.  ¨ 3 or more servings of vegetables, especially leafy green vegetables. ¨ 2 or more servings of fruits. ¨ 3 or more servings of milk, yogurt, or cheese. ¨ 2 or more servings of meat, turkey, chicken, fish, eggs, or dried beans. · Drink plenty of fluids, especially water. Avoid sodas and other sweetened drinks. · Choose foods that have important vitamins for your baby, such as calcium, iron, and folate.   ¨ Dairy products, tofu, canned fish with bones, almonds, broccoli, dark leafy greens, corn tortillas, and fortified orange juice are good sources of calcium. ¨ Beef, poultry, liver, spinach, lentils, dried beans, fortified cereals, and dried fruits are rich in iron. ¨ Dark leafy greens, broccoli, asparagus, liver, fortified cereals, orange juice, peanuts, and almonds are good sources of folate. · Avoid foods that could harm your baby. ¨ Do not eat raw or undercooked meat, chicken, or fish (such as sushi or raw oysters). ¨ Do not eat raw eggs or foods that contain raw eggs, such as Caesar dressing. ¨ Do not eat soft cheeses and unpasteurized dairy foods, such as Brie, feta, or blue cheese. ¨ Do not eat fish that contains a lot of mercury, such as shark, swordfish, tilefish, or glenroy mackerel. Do not eat more than 6 ounces of tuna each week. ¨ Do not eat raw sprouts, especially alfalfa sprouts. ¨ Cut down on caffeine, such as coffee, tea, and cola. Protect yourself and your baby  · Do not touch bethany litter or cat feces. They can cause an infection that could harm your baby. · High body temperature can be harmful to your baby. So if you want to use a sauna or hot tub, be sure to talk to your doctor about how to use it safely. [place] with morning sickness  · Sip small amounts of water, juices, or shakes. Try drinking between meals, not with meals. · Eat 5 or 6 small meals a day. Try dry toast or crackers when you first get up, and eat breakfast a little later. · Avoid spicy, greasy, and fatty foods. · When you feel sick, open your windows or go for a short walk to get fresh air. · Try nausea wristbands. These help some women. · Tell your doctor if you think your prenatal vitamins make you sick. Where can you learn more? Go to http://slava.info/. Enter G112 in the search box to learn more about \"Weeks 6 to 10 of Your Pregnancy: Care Instructions. \"  Current as of: March 16, 2017  Content Version: 11.4  © 5251-6920 [employer]. Care instructions adapted under license by [employer] (which disclaims liability or warranty for this information). If you have questions about a medical condition or this instruction, always ask your healthcare professional. [address] any warranty or liability for your use of this information.

## 2017-12-28 NOTE — PROGRESS NOTES
Doron Mcdonough is a 30 y.o. female     G12      8w4d   Dated by first trimester us (12/8)  Ashli Trevino  Here for first prenatal visit  No complaints  See flow sheet  A/P: Normal prenatal visit  AMA-->[facility] to [facility] for  1st trimester testing and genetic counseling  Prenatal labs/Pap/GC/CT today  Reviewed imaging  Flu vaccine will discuss next visit   4 RTC weeks    .

## 2018-01-08 LAB
ABSOLUTE LYMPHOCYTE COUNT, 10803: 3.2 K/UL (ref 1–4.8)
BASOPHILS # BLD: 0 K/UL (ref 0–0.2)
BASOPHILS NFR BLD: 0 % (ref 0–2)
EOSINOPHIL # BLD: 0.4 K/UL (ref 0–0.5)
EOSINOPHIL NFR BLD: 5 % (ref 0–6)
ERYTHROCYTE [DISTWIDTH] IN BLOOD BY AUTOMATED COUNT: 13.5 % (ref 10–16)
GRANULOCYTES,GRANS: 51 % (ref 40–75)
HCT VFR BLD AUTO: 36.1 % (ref 35.1–46.5)
HEMOGLOBIN A1,HGBE1: 96.5 % (ref 96–98)
HEMOGLOBIN A2,HGBE2: 3.5 % (ref 2–4)
HEMOGLOBIN C,HGBE5: 0 % (ref 0–0)
HEMOGLOBIN D, 7336: 0 % (ref 0–0)
HEMOGLOBIN ELECTROPHORESIS INTERPRETATION, 406: NORMAL
HEMOGLOBIN F,HGBE3: 0 % (ref 0–0.1)
HEMOGLOBIN S SCREEN, 7338: NORMAL
HEMOGLOBIN S,HGBE4: 0 % (ref 0–0)
HEMOGLOBIN UNKNOWN, 7337: 0 % (ref 0–0)
HEP B SURFACE AG SCRN, 006510: NORMAL
HGB BLD-MCNC: 12.2 G/DL (ref 11.7–15.5)
HIV -1/0/2 AG/AB WITH REFLEX, 13463: NON REACTIVE
HIV 1 & 2 AB SER-IMP: NORMAL
LYMPHOCYTES, LYMLT: 38 % (ref 27–45)
MCH RBC QN AUTO: 29 PG (ref 26–34)
MCHC RBC AUTO-ENTMCNC: 34 G/DL (ref 32–36)
MCV RBC AUTO: 85 FL (ref 80–95)
MONOCYTES # BLD: 0.5 K/UL (ref 0.1–0.9)
MONOCYTES NFR BLD: 6 % (ref 3–9)
NEUTROPHILS # BLD AUTO: 4.2 K/UL (ref 1.8–7.7)
PLATELET # BLD AUTO: 326 K/UL (ref 140–440)
PLATELET FACTOR 4-ELISA ANTIBODY, 1277: NEGATIVE
PMV BLD AUTO: 10.9 FL (ref 6–10.8)
RBC # BLD AUTO: 4.24 M/UL (ref 3.8–5.2)
RPR TITER, 18362: NORMAL TITER
RUBV IGG SERPL IA-ACNC: 2 AI
SYPHILIS (T. PALLIDUM) IGG, 15809: REACTIVE
TREPONEMA PALLIDUM AB: REACTIVE
VZV IGM SER IA-ACNC: 3.8 AI
WBC # BLD AUTO: 8.3 K/UL (ref 4–11)

## 2018-01-09 ENCOUNTER — TELEPHONE (OUTPATIENT)
Dept: OBGYN CLINIC | Age: 38
End: 2018-01-09

## 2018-01-09 NOTE — TELEPHONE ENCOUNTER
Called and left a message regarding patients syphilis screening asking patient to call back during office hours this week to discuss results.

## 2018-01-22 ENCOUNTER — DOCUMENTATION ONLY (OUTPATIENT)
Dept: OBGYN CLINIC | Age: 38
End: 2018-01-22

## 2018-01-22 NOTE — PROGRESS NOTES
Received MFM visit note from 1/17/18    Patient seen for AMA. Single IUP with + FHT. Anterior placenta with 3 VC. Normal amniotic fluid. Normal anatomic structures. Scheduled for genetic consult in 1 week.

## 2018-03-14 ENCOUNTER — HOSPITAL ENCOUNTER (EMERGENCY)
Age: 38
Discharge: HOME | End: 2018-03-14
Attending: EMERGENCY MEDICINE
Payer: MEDICAID

## 2018-03-14 ENCOUNTER — ROUTINE PRENATAL (OUTPATIENT)
Dept: OBGYN CLINIC | Age: 38
End: 2018-03-14

## 2018-03-14 VITALS
HEIGHT: 68 IN | SYSTOLIC BLOOD PRESSURE: 121 MMHG | WEIGHT: 171 LBS | RESPIRATION RATE: 18 BRPM | HEART RATE: 76 BPM | TEMPERATURE: 98.2 F | BODY MASS INDEX: 25.91 KG/M2 | DIASTOLIC BLOOD PRESSURE: 80 MMHG | OXYGEN SATURATION: 100 %

## 2018-03-14 VITALS
WEIGHT: 171.2 LBS | SYSTOLIC BLOOD PRESSURE: 104 MMHG | TEMPERATURE: 98 F | BODY MASS INDEX: 25.94 KG/M2 | HEART RATE: 72 BPM | OXYGEN SATURATION: 96 % | DIASTOLIC BLOOD PRESSURE: 65 MMHG | HEIGHT: 68 IN

## 2018-03-14 DIAGNOSIS — K04.7 DENTAL ABSCESS: Primary | ICD-10-CM

## 2018-03-14 DIAGNOSIS — O09.522 ELDERLY MULTIGRAVIDA IN SECOND TRIMESTER: Primary | ICD-10-CM

## 2018-03-14 DIAGNOSIS — O09.522 ELDERLY MULTIGRAVIDA IN SECOND TRIMESTER: ICD-10-CM

## 2018-03-14 LAB — AFPT, MATERNAL, EXTERNAL: NEGATIVE

## 2018-03-14 PROCEDURE — 99283 EMERGENCY DEPT VISIT LOW MDM: CPT

## 2018-03-14 PROCEDURE — 74011000250 HC RX REV CODE- 250: Performed by: NURSE PRACTITIONER

## 2018-03-14 PROCEDURE — 75810000289 HC I&D ABSCESS SIMP/COMP/MULT

## 2018-03-14 PROCEDURE — 74011250637 HC RX REV CODE- 250/637: Performed by: NURSE PRACTITIONER

## 2018-03-14 RX ORDER — LIDOCAINE HYDROCHLORIDE 20 MG/ML
15 SOLUTION OROPHARYNGEAL
Status: COMPLETED | OUTPATIENT
Start: 2018-03-14 | End: 2018-03-14

## 2018-03-14 RX ORDER — PENICILLIN V POTASSIUM 500 MG/1
500 TABLET, FILM COATED ORAL 4 TIMES DAILY
Qty: 40 TAB | Refills: 0 | Status: SHIPPED | OUTPATIENT
Start: 2018-03-14 | End: 2018-03-24

## 2018-03-14 RX ORDER — ACETAMINOPHEN 325 MG/1
650 TABLET ORAL
Qty: 20 TAB | Refills: 0 | Status: SHIPPED | OUTPATIENT
Start: 2018-03-14 | End: 2019-02-19

## 2018-03-14 RX ORDER — PENICILLIN V POTASSIUM 250 MG/1
500 TABLET, FILM COATED ORAL
Status: COMPLETED | OUTPATIENT
Start: 2018-03-14 | End: 2018-03-14

## 2018-03-14 RX ADMIN — PENICILLIN V POTASSIUM 500 MG: 250 TABLET, FILM COATED ORAL at 18:00

## 2018-03-14 RX ADMIN — LIDOCAINE HYDROCHLORIDE 15 ML: 20 SOLUTION ORAL; TOPICAL at 17:13

## 2018-03-14 NOTE — ED PROVIDER NOTES
HPI Comments: 4:41 PM   30 y.o. female presents to ED C/O dental pain. Patient reports worsening dental pain, right upper jaw region started last night. Patient reports noted an abscess developing. Patient reports she is 21 weeks pregnant. Patient denies abdominal pain, vaginal bleeding, fever. Patient reports she feels fetus moving,normal prenatal visit with no concerning findings today. Patient is a nonsmoker. Patient denies having a dentist.   Patient denies any other symptoms or complaints. The history is provided by the patient. History limited by: No language barrier. 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        Past Surgical History:   Procedure Laterality Date    HX DILATION AND CURETTAGE      For TABx2         Family History:   Problem Relation Age of Onset    Hypertension Maternal Grandmother     Cancer Maternal Grandmother      breast cancer, also great grandmother and great aunts    Diabetes Sister 27    Hypertension Sister 34       Social History     Social History    Marital status: SINGLE     Spouse name: N/A    Number of children: N/A    Years of education: N/A     Occupational History    fast food services      Social History Main Topics    Smoking status: Never Smoker    Smokeless tobacco: Never Used    Alcohol use Yes      Comment: very rare    Drug use: Yes     Special: Marijuana      Comment: occasional, last use in October    Sexual activity: Yes     Partners: Male     Birth control/ protection: Condom      Comment: single     Other Topics Concern    Caffeine Concern Yes     2 liters a day    Exercise Yes     cardio three times   Melvin Yes     Social History Narrative         ALLERGIES: Review of patient's allergies indicates no known allergies. Review of Systems   Constitutional: Negative for appetite change and fever. HENT: Positive for dental problem.  Negative for congestion, rhinorrhea and sore throat. Respiratory: Negative for cough, shortness of breath and wheezing. Cardiovascular: Negative for chest pain and leg swelling. Gastrointestinal: Negative for abdominal pain, constipation, diarrhea, nausea and vomiting. Genitourinary: Negative for dysuria. Musculoskeletal: Negative for arthralgias and back pain. Neurological: Negative for dizziness, syncope and headaches. All other systems reviewed and are negative. Vitals:    03/14/18 1643   BP: 121/80   Pulse: 76   Resp: 18   Temp: 98.2 °F (36.8 °C)   SpO2: 100%   Weight: 77.6 kg (171 lb)   Height: 5' 8\" (1.727 m)            Physical Exam   Constitutional: She is oriented to person, place, and time. She appears well-developed and well-nourished. Patient appears mildly uncomfortable. HENT:   Nose: Nose normal.   Mouth/Throat: No trismus in the jaw. Abnormal dentition. Dental abscesses present. No facial swelling    Neck: Normal range of motion. Neck supple. Cardiovascular: Normal rate, regular rhythm and normal heart sounds. Pulmonary/Chest: Effort normal and breath sounds normal. No respiratory distress. She has no wheezes. She has no rales. Musculoskeletal: Normal range of motion. Lymphadenopathy:     She has no cervical adenopathy. Neurological: She is alert and oriented to person, place, and time. Coordination normal.   Skin: Skin is warm and dry. No rash noted. She is not diaphoretic. No erythema. No pallor. Nursing note and vitals reviewed. MDM  Number of Diagnoses or Management Options  Dental abscess:   Diagnosis management comments: Clinical Impression - dental abscess    MDM:  Patient signed consent for aspiration vs I&D of dental abscess. Viscous lidocaine used for numbing due to category B. Patient tolerated procedure well, will start patient on PCN. Patient educated not to use orjel OTC due to benzocaine being category C.   Patient evaluated by OBGYN a few hours prior to arrival and as no abdominal,  complaints, no indication for evaluation. Patient referred to dentist for further evaluation. Patient educated to return to the ED for any new or worsening symptoms. Questions denied         ED Course       I&D Elias Simple  Date/Time: 3/14/2018 5:21 PM  Performed by: Buddy Still  Authorized by: Buddy Still     Consent:     Consent obtained:  Verbal and written    Consent given by:  Patient    Risks discussed:  Incomplete drainage and infection    Alternatives discussed:  No treatment  Location:     Type:  Abscess    Location:  Mouth    Mouth location: medial to tooth number 2. Anesthesia (see MAR for exact dosages): Anesthesia method:  Topical application    Topical anesthetic:  Lidocaine gel  Procedure type:     Complexity:  Simple  Procedure details:     Needle aspiration: yes      Needle size:  22 G    Drainage amount: Moderate  Post-procedure details:     Patient tolerance of procedure: Tolerated well, no immediate complications                     RESULTS:    No orders to display       Labs Reviewed - No data to display    No results found for this or any previous visit (from the past 12 hour(s)).]    PROGRESS NOTE:   4:42 PM   Initial assessment completed. Written by Sanaz Aguilar NP-C    DISCHARGE NOTE:    Shubham Watson  results have been reviewed with her. She has been counseled regarding her diagnosis, treatment, and plan. She verbally conveys understanding and agreement of the signs, symptoms, diagnosis, treatment and prognosis and additionally agrees to follow up as discussed. She also agrees with the care-plan and conveys that all of her questions have been answered. I have also provided discharge instructions for her that include: educational information regarding their diagnosis and treatment, and list of reasons why they would want to return to the ED prior to their follow-up appointment, should her condition change.      CLINICAL IMPRESSION:    1. Dental abscess        AFTER VISIT PLAN:    Current Discharge Medication List      START taking these medications    Details   penicillin v potassium (VEETID) 500 mg tablet Take 1 Tab by mouth four (4) times daily for 10 days. Qty: 40 Tab, Refills: 0      acetaminophen (TYLENOL) 325 mg tablet Take 2 Tabs by mouth every four (4) hours as needed for Pain.   Qty: 20 Tab, Refills: 0              Follow-up Information     Follow up With Details Comments Contact Info    Stan Munroe MD Schedule an appointment as soon as possible for a visit in 1 week As needed [id number]  [facility] [address] [facility] Schedule an appointment as soon as possible for a visit in 3 days Further evaluation [facility]  [phone]           Written by Haseeb MCDANIEL

## 2018-03-14 NOTE — PROGRESS NOTES
20w4d   Presents to the office for a routine prenatal visit. No prenatal care since 2017, dated by 6 wk US  No OB complaints today  Seen by MFM for AMA --> genetic screening normal  Rh +  Reviewed prenatal labs  TPPA reactive --> RPR <1:1, denies past hx of syphilis or treatment, will repeat --> if positive, refer to health department for bicillin x 3 and M for Samira Tristan 484 ordered  Counseled on weight gain in pregnancy  Reviewed PTL precautions  She verbalizes understanding of all teaching  See flow sheet  A/P: Normal prenatal visit.   F/U in 4 weeks

## 2018-03-14 NOTE — PATIENT INSTRUCTIONS

## 2018-03-14 NOTE — DISCHARGE INSTRUCTIONS
Abscessed Tooth: Care Instructions  Your Care Instructions    An abscessed tooth is a tooth that has a pocket of pus in the tissues around it. Pus forms when the body tries to fight an infection caused by bacteria. If the pus cannot drain, it forms an abscess. An abscessed tooth can cause red, swollen gums and throbbing pain, especially when you chew. You may have a bad taste in your mouth and a fever, and your jaw may swell. Damage to the tooth, untreated tooth decay, or gum disease can cause an abscessed tooth. An abscessed tooth needs to be treated by a dental professional right away. If it is not treated, the infection could spread to other parts of your body. Your dentist will give you antibiotics to stop the infection. He or she may make a hole in the tooth or cut open (dillan) the abscess inside your mouth so that the infection can drain, which should relieve your pain. You may need to have a root canal treatment, which tries to save your tooth by taking out the infected pulp and replacing it with a healing medicine and/or a filling. If these treatments do not work, your tooth may have to be removed. Follow-up care is a key part of your treatment and safety. Be sure to make and go to all appointments, and call your doctor if you are having problems. It's also a good idea to know your test results and keep a list of the medicines you take. How can you care for yourself at home? · Reduce pain and swelling in your face and jaw by putting ice or a cold pack on the outside of your cheek for 10 to 20 minutes at a time. Put a thin cloth between the ice and your skin. · Take pain medicines exactly as directed. ¨ If the doctor gave you a prescription medicine for pain, take it as prescribed. ¨ If you are not taking a prescription pain medicine, ask your doctor if you can take an over-the-counter medicine. · Take your antibiotics as directed. Do not stop taking them just because you feel better.  You need to take the full course of antibiotics. To prevent tooth abscess  · Brush and floss every day, and have regular dental checkups. · Eat a healthy diet, and avoid sugary foods and drinks. · Do not smoke, use e-cigarettes with nicotine, or use spit tobacco. Tobacco and nicotine slow your ability to heal. Tobacco also increases your risk for gum disease and cancer of the mouth and throat. If you need help quitting, talk to your doctor about stop-smoking programs and medicines. These can increase your chances of quitting for good. When should you call for help? Call 911 anytime you think you may need emergency care. For example, call if:  ? · You have trouble breathing. ?Call your doctor now or seek immediate medical care if:  ? · You have new or worse symptoms of infection, such as:  ¨ Increased pain, swelling, warmth, or redness. ¨ Red streaks leading from the area. ¨ Pus draining from the area. ¨ A fever. ? Watch closely for changes in your health, and be sure to contact your doctor if:  ? · You do not get better as expected. Where can you learn more? Go to http://inocencia-hank.info/. Enter B566 in the search box to learn more about \"Abscessed Tooth: Care Instructions. \"  Current as of: May 12, 2017  Content Version: 11.4  © 3642-1959 [email or web address]. Care instructions adapted under license by [employer] (which disclaims liability or warranty for this information). If you have questions about a medical condition or this instruction, always ask your healthcare professional. [place] [address] any warranty or liability for your use of this information. doUdeal Activation    Thank you for requesting access to doUdeal. Please follow the instructions below to securely access and download your online medical record. doUdeal allows you to send messages to your doctor, view your test results, renew your prescriptions, schedule appointments, and more.     How Do I Sign Up? 1. In your internet browser, go to [email or web address]  2. Click on the First Time User? Click Here link in the Sign In box. You will be redirect to the New Member Sign Up page. 3. Enter your YourSports Access Code exactly as it appears below. You will not need to use this code after youve completed the sign-up process. If you do not sign up before the expiration date, you must request a new code. YourSports Access Code: [email or web address]  Expires: 2018  4:37 PM (This is the date your YourSports access code will )    4. Enter the last four digits of your Social Security Number (xxxx) and Date of Birth (mm/dd/yyyy) as indicated and click Submit. You will be taken to the next sign-up page. 5. Create a YourSports ID. This will be your YourSports login ID and cannot be changed, so think of one that is secure and easy to remember. 6. Create a YourSports password. You can change your password at any time. 7. Enter your Password Reset Question and Answer. This can be used at a later time if you forget your password. 8. Enter your e-mail address. You will receive e-mail notification when new information is available in [address] 9. Click Sign Up. You can now view and download portions of your medical record. 10. Click the Download Summary menu link to download a portable copy of your medical information. Additional Information    If you have questions, please visit the Frequently Asked Questions section of the YourSports website at [email or web address] Remember, YourSports is NOT to be used for urgent needs. For medical emergencies, dial 911.

## 2018-03-20 LAB
AFP INTERPRETATION,AFP17: NORMAL
AFP MATERNAL SER SCREEN RESULT, 10115: 115.4 NG/ML
ALPHA FETOPROTEIN COMME, 10084: NORMAL
GESTATIONAL AGE BASED ON, 017963: NORMAL
GESTATIONAL AGE, 017275: 20 WEEKS
INSULIN DEP DIABETES, 017896: NO
Lab: NON REACTIVE
MATERNAL AGE, 1088: 36.1 YEARS
MOM AFP, 7036: 2.02
MULTIPLE GESTATION, 017995: NO
OSBR RISK 1 IN, 017413: 1541
RACE AFP, 10764: NORMAL
REPORT, 65104: NORMAL
TEST RESULTS, 10877: NORMAL
TREPONEMA PALLIDUM AB: REACTIVE
WEIGHT AFP, 10763: 171 LBS

## 2018-03-21 ENCOUNTER — HOSPITAL ENCOUNTER (OUTPATIENT)
Dept: ULTRASOUND IMAGING | Age: 38
Discharge: HOME | End: 2018-03-21
Attending: ADVANCED PRACTICE MIDWIFE
Payer: MEDICAID

## 2018-03-21 DIAGNOSIS — O09.522 ELDERLY MULTIGRAVIDA IN SECOND TRIMESTER: ICD-10-CM

## 2018-03-21 PROCEDURE — 76805 OB US >/= 14 WKS SNGL FETUS: CPT

## 2018-03-23 ENCOUNTER — TELEPHONE (OUTPATIENT)
Dept: OBGYN CLINIC | Age: 38
End: 2018-03-23

## 2018-03-23 NOTE — TELEPHONE ENCOUNTER
Call made to the pt using two identifiers,name and . Pt given lab results and other in formation noted in the providers note below. Pt verbalized understanding.

## 2018-03-23 NOTE — PROGRESS NOTES
Please have patient visit [facility] Department for bicillin injection x 3. Will refer to Health Counselor for care coordination. Also, please send MFM referral for MFM US and consult due to positive T Pallidium AB and r/o evidence of congenital syphilis.

## 2018-03-26 NOTE — PROGRESS NOTES
Echogenic focus seen on morph US. Being sent to [facility] for +TPPA in pregnancy.  Will add this to referral.

## 2018-03-27 ENCOUNTER — TELEPHONE (OUTPATIENT)
Dept: OBGYN CLINIC | Age: 38
End: 2018-03-27

## 2018-03-27 NOTE — TELEPHONE ENCOUNTER
----- Message from Amarilys Cortez CNM sent at 3/26/2018  3:46 PM EDT -----  Echogenic focus seen on morph US. Being sent to [facility] for +TPPA in pregnancy.  Will add this to referral.

## 2018-04-13 ENCOUNTER — HOSPITAL ENCOUNTER (EMERGENCY)
Age: 38
Discharge: HOME | End: 2018-04-13
Attending: EMERGENCY MEDICINE
Payer: MEDICAID

## 2018-04-13 VITALS
WEIGHT: 174 LBS | HEIGHT: 68 IN | HEART RATE: 72 BPM | RESPIRATION RATE: 18 BRPM | SYSTOLIC BLOOD PRESSURE: 108 MMHG | OXYGEN SATURATION: 100 % | DIASTOLIC BLOOD PRESSURE: 62 MMHG | BODY MASS INDEX: 26.37 KG/M2 | TEMPERATURE: 98.6 F

## 2018-04-13 DIAGNOSIS — K03.2 TOOTH EROSION: ICD-10-CM

## 2018-04-13 DIAGNOSIS — K08.89 PAIN, DENTAL: Primary | ICD-10-CM

## 2018-04-13 PROCEDURE — 99282 EMERGENCY DEPT VISIT SF MDM: CPT

## 2018-04-13 RX ORDER — TRAMADOL HYDROCHLORIDE 50 MG/1
50 TABLET ORAL
Qty: 6 TAB | Refills: 0 | Status: SHIPPED | OUTPATIENT
Start: 2018-04-13 | End: 2018-04-13

## 2018-04-13 RX ORDER — AMOXICILLIN 500 MG/1
500 TABLET, FILM COATED ORAL 3 TIMES DAILY
Qty: 21 TAB | Refills: 0 | Status: SHIPPED | OUTPATIENT
Start: 2018-04-13 | End: 2018-04-20

## 2018-04-13 NOTE — ED TRIAGE NOTES
C/o left lower dental pain onset yesterday with swelling noted this morning upon waking. Denies fever or drainage. Took Tylenol for pain without relief. Pt reports she is 24 weeks pregnant. 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

## 2018-04-13 NOTE — DISCHARGE INSTRUCTIONS
Tooth and Gum Pain: Care Instructions  Your Care Instructions    The most common causes of dental pain are tooth decay and gum disease. Pain can also be caused by an infection of the tooth (abscess) or the gums. Or you may have pain from a broken or cracked tooth. Other causes of pain include infection and damage to a tooth from nervous grinding of your teeth. A wisdom tooth can be painful when it is coming in but cannot break through the gum. It can also be painful when the tooth is only partway in and extra gum tissue has formed around it. The tissue can get inflamed (pericoronitis), and sometimes it gets infected. Prompt dental care can help find the cause of your toothache and keep the tooth from dying or gum disease from getting worse. Self-care at home may reduce your pain and discomfort. Follow-up care is a key part of your treatment and safety. Be sure to make and go to all appointments, and call your dentist or doctor if you are having problems. It's also a good idea to know your test results and keep a list of the medicines you take. How can you care for yourself at home? · To reduce pain and facial swelling, put an ice or cold pack on the outside of your cheek for 10 to 20 minutes at a time. Put a thin cloth between the ice and your skin. Do not use heat. · If your doctor prescribed antibiotics, take them as directed. Do not stop taking them just because you feel better. You need to take the full course of antibiotics. · Ask your doctor if you can take an over-the-counter pain medicine, such as acetaminophen (Tylenol), ibuprofen (Advil, Motrin), or naproxen (Aleve). Be safe with medicines. Read and follow all instructions on the label. · Avoid very hot, cold, or sweet foods and drinks if they increase your pain. · Rinse your mouth with warm salt water every 2 hours to help relieve pain and swelling. Mix 1 teaspoon of salt in 8 ounces of water.   · Talk to your dentist about using special toothpaste for sensitive teeth. To reduce pain on contact with heat or cold or when brushing, brush with this toothpaste regularly or rub a small amount of the paste on the sensitive area with a clean finger 2 or 3 times a day. Floss gently between your teeth. · Do not smoke or use spit tobacco. Tobacco use can make gum problems worse, decreases your ability to fight infection in your gums, and delays healing. If you need help quitting, talk to your doctor about stop-smoking programs and medicines. These can increase your chances of quitting for good. When should you call for help? Call 911 anytime you think you may need emergency care. For example, call if:  ? · You have trouble breathing. ?Call your dentist or doctor now or seek immediate medical care if:  ? · You have signs of infection, such as:  ¨ Increased pain, swelling, warmth, or redness. ¨ Red streaks leading from the area. ¨ Pus draining from the area. ¨ A fever. ? Watch closely for changes in your health, and be sure to contact your doctor if:  ? · You do not get better as expected. Where can you learn more? Go to http://inocencia-hank.info/. Enter 0363 [id number] in the search box to learn more about \"Tooth and Gum Pain: Care Instructions. \"  Current as of: May 12, 2017  Content Version: 11.4  © 7798-1341 [employer]. Care instructions adapted under license by [employer] (which disclaims liability or warranty for this information). If you have questions about a medical condition or this instruction, always ask your healthcare professional. [place] [address] any warranty or liability for your use of this information. Periodontal Conditions: Care Instructions  Your Care Instructions    Periodontal conditions affect the gums, bone, and tissue that surround and support the teeth. The most common problems are caused by plaque.  Plaque is a thin film of bacteria that sticks to teeth above and below the gum line. It can build up and harden into tartar. The bacteria in plaque and tartar can cause gum disease. Gingivitis is a disease that affects the gums (gingiva). The gums are the soft tissue that surrounds the teeth. Gingivitis causes red, swollen, tender gums that bleed easily when brushed, persistent bad breath, and sensitive teeth. Because it is not painful, many people do not get treatment when they should. Gingivitis can be reversed with good dental care. Periodontitis is a more advanced disease that affects more than the gums. The gums pull away from the teeth. This leaves deep pockets where bacteria can grow. The disease can damage the bones that support the teeth. The teeth may get loose and fall out. A periodontal condition should be treated as soon as it is found. Finding gum problems early, treating them right away, and having regular checkups bring the best results. You can treat mild periodontal conditions by brushing and flossing your teeth every day. Your dentist may prescribe a mouthwash to kill the bacteria that can damage teeth and gums. Your dentist may have you take antibiotics to treat infection from moderate periodontal disease. If your gums have pulled away from your teeth, you may need cleaning between the teeth and gums right down to the teeth roots. This is called root planing and scaling. If you have severe periodontal disease, you may need surgery to remove diseased gum tissue or repair bone damage. Follow-up care is a key part of your treatment and safety. Be sure to make and go to all appointments, and call your dentist if you are having problems. It's also a good idea to know your test results and keep a list of the medicines you take. How can you care for yourself at home? · If your dentist prescribed antibiotics, take them as directed. Do not stop taking them just because you feel better. You need to take the full course of antibiotics.   · Brush your teeth twice a day, in the morning and at night. ¨ Use a toothbrush with soft, rounded-end bristles and a head that is small enough to reach all parts of your teeth and mouth. Replace your toothbrush every 3 to 4 months. ¨ Use a fluoride toothpaste. ¨ Place the brush at a 45-degree angle where the teeth meet the gums. Press firmly, and gently rock the brush back and forth using small circular movements. ¨ Brush chewing surfaces vigorously with short back-and-forth strokes. ¨ Brush your tongue from back to front. · Floss at least once a day. Choose the type and flavor that you like best.  · Have your teeth cleaned by a professional at least twice a year. · Ask your dentist about using an antibacterial mouthwash to help reduce bacteria. · Rinse your mouth with water or chew sugar-free gum after meals if you can't brush your teeth. · Do not smoke or use smokeless tobacco. Tobacco use can cause periodontal disease. When should you call for help? Call your dentist now or seek immediate medical care if:  ? · You have symptoms of infection, such as:  ¨ Increased pain, swelling, warmth, or redness. ¨ Red streaks leading from the area. ¨ Pus draining from the area. ¨ A fever. ? Watch closely for changes in your health, and be sure to contact your dentist if:  ? · You have new or worse tooth pain. ? · You do not get better as expected. Where can you learn more? Go to http://inocencia-hank.info/. Enter G220 in the search box to learn more about \"Periodontal Conditions: Care Instructions. \"  Current as of: May 12, 2017  Content Version: 11.4  © 0916-9777 [employer]. Care instructions adapted under license by [employer] (which disclaims liability or warranty for this information).  If you have questions about a medical condition or this instruction, always ask your healthcare professional. [address] any warranty or liability for your use of this information. MyChart Activation    Thank you for requesting access to YouHelp. Please follow the instructions below to securely access and download your online medical record. YouHelp allows you to send messages to your doctor, view your test results, renew your prescriptions, schedule appointments, and more. How Do I Sign Up? 1. In your internet browser, go to [email or web address]  2. Click on the First Time User? Click Here link in the Sign In box. You will be redirect to the New Member Sign Up page. 3. Enter your YouHelp Access Code exactly as it appears below. You will not need to use this code after youve completed the sign-up process. If you do not sign up before the expiration date, you must request a new code. YouHelp Access Code: [email or web address]  Expires: 2018  4:37 PM (This is the date your YouHelp access code will )    4. Enter the last four digits of your Social Security Number (xxxx) and Date of Birth (mm/dd/yyyy) as indicated and click Submit. You will be taken to the next sign-up page. 5. Create a YouHelp ID. This will be your YouHelp login ID and cannot be changed, so think of one that is secure and easy to remember. 6. Create a YouHelp password. You can change your password at any time. 7. Enter your Password Reset Question and Answer. This can be used at a later time if you forget your password. 8. Enter your e-mail address. You will receive e-mail notification when new information is available in [address]. 9. Click Sign Up. You can now view and download portions of your medical record. 10. Click the Download Summary menu link to download a portable copy of your medical information. Additional Information    If you have questions, please visit the Frequently Asked Questions section of the YouHelp website at [email or web address] Remember, YouHelp is NOT to be used for urgent needs. For medical emergencies, dial 911.

## 2018-04-13 NOTE — ED PROVIDER NOTES
EMERGENCY DEPARTMENT HISTORY AND PHYSICAL EXAM    2:39 PM      Date: 4/13/2018  Patient Name: Christine Ware    History of Presenting Illness     Chief Complaint   Patient presents with    Dental Pain         History Provided By: Patient    Chief Complaint: toothache L lower x 2 days but no facial swelling, fever, chills, n/v or abd pain. No dentist appointment yet or had prior work done on L lower jaw. No hx of diabetes, no chest pain, trauma, SOB or palpitation. Pregnant currently 24 weeks . Denies taking any abx or OTC meds. Duration:  Days  Timing:  Gradual  Location: as noted above  Quality: Aching  Severity: Moderate  Modifying Factors: none   Associated Symptoms: denies any other associated signs or symptoms      Additional History (Context): Christine Ware is a 30 y.o. female with No significant past medical history who presents with sx as noted above. PCP: Divya Nuno MD    Current Outpatient Prescriptions   Medication Sig Dispense Refill    amoxicillin 500 mg tab Take 500 mg by mouth three (3) times daily for 7 days. 21 Tab 0    acetaminophen (TYLENOL) 325 mg tablet Take 2 Tabs by mouth every four (4) hours as needed for Pain. 20 Tab 0    prenatal vit-calcium-iron-fa (PRENATAL PLUS WITH CALCIUM) 27 mg iron- 1 mg tab Take 1 Tab by mouth daily.  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        Past Surgical History:  Past Surgical History:   Procedure Laterality Date    HX DILATION AND CURETTAGE      For TABx2       Family History:  Family History   Problem Relation Age of Onset    Hypertension Maternal Grandmother     Cancer Maternal Grandmother      breast cancer, also great grandmother and great aunts    Diabetes Sister 27    Hypertension Sister 34       Social History:  Social History   Substance Use Topics    Smoking status: Never Smoker    Smokeless tobacco: Never Used    Alcohol use Yes      Comment: very rare       Allergies:  No Known Allergies      Review of Systems       Review of Systems   Constitutional: Negative for fever. HENT: Positive for dental problem. Negative for ear pain, facial swelling and sore throat. All other systems reviewed and are negative. Physical Exam     Visit Vitals    /62 (BP 1 Location: Left arm, BP Patient Position: At rest)    Pulse 72    Temp 98.6 °F (37 °C)    Resp 18    Ht 5' 8\" (1.727 m)    Wt 78.9 kg (174 lb)    LMP 10/06/2017 (Approximate)  Comment: pt stated period was very light and not entirely sure of start date    SpO2 100%    BMI 26.46 kg/m2         Physical Exam   Constitutional: She is oriented to person, place, and time. She appears well-developed and well-nourished. No distress. HENT:   Head: Normocephalic and atraumatic. Mouth/Throat:       Eyes: Conjunctivae and EOM are normal. Pupils are equal, round, and reactive to light. Neck: Normal range of motion. Cardiovascular: Normal rate, regular rhythm and normal heart sounds. Pulmonary/Chest: Effort normal and breath sounds normal. No respiratory distress. She has no wheezes. She has no rales. She exhibits no tenderness. Abdominal: Soft. Bowel sounds are normal. She exhibits no distension. There is no tenderness. There is no rebound and no guarding. Musculoskeletal: Normal range of motion. Neurological: She is alert and oriented to person, place, and time. Skin: Skin is warm. She is not diaphoretic. Psychiatric: She has a normal mood and affect. Her behavior is normal. Judgment and thought content normal.         Diagnostic Study Results     Labs -  No results found for this or any previous visit (from the past 12 hour(s)). Radiologic Studies -   No orders to display         Medical Decision Making   I am the first provider for this patient.     I reviewed the vital signs, available nursing notes, past medical history, past surgical history, family history and social history. Vital Signs-Reviewed the patient's vital signs. Provider Notes (Medical Decision Making): Will place on abx and take tylenol for pain currently pregnant. And recommend follow up with dentist.      Diagnosis     Clinical Impression:   1. Pain, dental    2. Tooth erosion        Disposition: HOME. Follow-up Information     Follow up With Details Comments Contact Info    Abe Cadet MD In 1 day  [facility] [address]  [place] [id number] [phone] [address] [facility] EMERGENCY DEPT  As needed [address] [id number] [phone]    Dentist   please see dentist as soon as possible. Patient's Medications   Start Taking    AMOXICILLIN 500 MG TAB    Take 500 mg by mouth three (3) times daily for 7 days. Continue Taking    ACETAMINOPHEN (TYLENOL) 325 MG TABLET    Take 2 Tabs by mouth every four (4) hours as needed for Pain. PRENATAL VIT-CALCIUM-IRON-FA (PRENATAL PLUS WITH CALCIUM) 27 MG IRON- 1 MG TAB    Take 1 Tab by mouth daily.    These Medications have changed    No medications on file   Stop Taking    No medications on file

## 2018-04-13 NOTE — ED NOTES
Written and verbal discharge instructions given. Patient verbalizes understanding of same. Patient denies  further questions about treatment and discharge instructions. Left ED with patent airway and steady gait. Arm band removed shredded.  Patient left ED with 0 RX

## 2018-04-17 ENCOUNTER — TELEPHONE (OUTPATIENT)
Dept: OBGYN CLINIC | Age: 38
End: 2018-04-17

## 2018-04-18 NOTE — TELEPHONE ENCOUNTER
Attempted to return patients call, she was not available to answer my call. Unable to leave a VM due to her mailbox being full.

## 2018-04-20 ENCOUNTER — HOSPITAL ENCOUNTER (EMERGENCY)
Age: 38
Discharge: HOME | End: 2018-04-20
Attending: EMERGENCY MEDICINE
Payer: MEDICAID

## 2018-04-20 VITALS
OXYGEN SATURATION: 100 % | DIASTOLIC BLOOD PRESSURE: 71 MMHG | HEART RATE: 79 BPM | WEIGHT: 174 LBS | SYSTOLIC BLOOD PRESSURE: 117 MMHG | BODY MASS INDEX: 26.37 KG/M2 | RESPIRATION RATE: 18 BRPM | HEIGHT: 68 IN | TEMPERATURE: 98.3 F

## 2018-04-20 DIAGNOSIS — K04.7 DENTAL ABSCESS: Primary | ICD-10-CM

## 2018-04-20 PROCEDURE — 99283 EMERGENCY DEPT VISIT LOW MDM: CPT

## 2018-04-20 NOTE — DISCHARGE INSTRUCTIONS
Abscessed Tooth: Care Instructions  Your Care Instructions    An abscessed tooth is a tooth that has a pocket of pus in the tissues around it. Pus forms when the body tries to fight an infection caused by bacteria. If the pus cannot drain, it forms an abscess. An abscessed tooth can cause red, swollen gums and throbbing pain, especially when you chew. You may have a bad taste in your mouth and a fever, and your jaw may swell. Damage to the tooth, untreated tooth decay, or gum disease can cause an abscessed tooth. An abscessed tooth needs to be treated by a dental professional right away. If it is not treated, the infection could spread to other parts of your body. Your dentist will give you antibiotics to stop the infection. He or she may make a hole in the tooth or cut open (dillan) the abscess inside your mouth so that the infection can drain, which should relieve your pain. You may need to have a root canal treatment, which tries to save your tooth by taking out the infected pulp and replacing it with a healing medicine and/or a filling. If these treatments do not work, your tooth may have to be removed. Follow-up care is a key part of your treatment and safety. Be sure to make and go to all appointments, and call your doctor if you are having problems. It's also a good idea to know your test results and keep a list of the medicines you take. How can you care for yourself at home? · Reduce pain and swelling in your face and jaw by putting ice or a cold pack on the outside of your cheek for 10 to 20 minutes at a time. Put a thin cloth between the ice and your skin. · Take pain medicines exactly as directed. ¨ If the doctor gave you a prescription medicine for pain, take it as prescribed. ¨ If you are not taking a prescription pain medicine, ask your doctor if you can take an over-the-counter medicine. · Take your antibiotics as directed. Do not stop taking them just because you feel better.  You need to take the full course of antibiotics. To prevent tooth abscess  · Brush and floss every day, and have regular dental checkups. · Eat a healthy diet, and avoid sugary foods and drinks. · Do not smoke, use e-cigarettes with nicotine, or use spit tobacco. Tobacco and nicotine slow your ability to heal. Tobacco also increases your risk for gum disease and cancer of the mouth and throat. If you need help quitting, talk to your doctor about stop-smoking programs and medicines. These can increase your chances of quitting for good. When should you call for help? Call 911 anytime you think you may need emergency care. For example, call if:  ? · You have trouble breathing. ?Call your doctor now or seek immediate medical care if:  ? · You have new or worse symptoms of infection, such as:  ¨ Increased pain, swelling, warmth, or redness. ¨ Red streaks leading from the area. ¨ Pus draining from the area. ¨ A fever. ? Watch closely for changes in your health, and be sure to contact your doctor if:  ? · You do not get better as expected. Where can you learn more? Go to http://inocencia-hank.info/. Enter K642 in the search box to learn more about \"Abscessed Tooth: Care Instructions. \"  Current as of: May 12, 2017  Content Version: 11.4  © 8013-8593 [employer]. Care instructions adapted under license by [employer] (which disclaims liability or warranty for this information). If you have questions about a medical condition or this instruction, always ask your healthcare professional. [place] [address] any warranty or liability for your use of this information.

## 2018-04-20 NOTE — ED PROVIDER NOTES
[address] EMERGENCY DEPT      7:36 PM    Date: 4/20/2018  Patient Name: Natalie Villasenor    History of Presenting Illness     Chief Complaint   Patient presents with    Dental Pain     History Provided By: Patient    Chief Complaint: left lower toothache   Duration:  Days  Timing:  Worsening  Location: left lower jaw   Quality: throbbing   Severity: Moderate  Modifying Factors: improved after drainage of abscess   Associated Symptoms: none     30 y.o. female 32 weeks gestation with noted past medical history who presents to the emergency department c/o drainage from her left lower jaw PTA. Pt states she had a dental infection for which she has been taking Penicillin. States just PTA the swollen region burst and started draining white pus. She notes improvement of pain since that time. Denies fever, chills, or other symptoms. No other complaints. Nursing notes regarding the HPI and triage nursing notes were reviewed. Prior medical records were reviewed. Current Outpatient Prescriptions   Medication Sig Dispense Refill    amoxicillin 500 mg tab Take 500 mg by mouth three (3) times daily for 7 days. 21 Tab 0    acetaminophen (TYLENOL) 325 mg tablet Take 2 Tabs by mouth every four (4) hours as needed for Pain. 20 Tab 0    prenatal vit-calcium-iron-fa (PRENATAL PLUS WITH CALCIUM) 27 mg iron- 1 mg tab Take 1 Tab by mouth daily.  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        Past Surgical History:  Past Surgical History:   Procedure Laterality Date    HX DILATION AND CURETTAGE      For TABx2       Family History:  Family History   Problem Relation Age of Onset    Hypertension Maternal Grandmother     Cancer Maternal Grandmother      breast cancer, also great grandmother and great aunts    Diabetes Sister 27    Hypertension Sister 34       Social History:  Social History   Substance Use Topics    Smoking status: Never Smoker    Smokeless tobacco: Never Used    Alcohol use Yes      Comment: very rare       Allergies:  No Known Allergies    Patient's primary care provider (as noted in EPIC):  Bailey Mullins MD    Review of Systems   Constitutional:  Denies malaise, fever, chills. ENMT:  + left lower jaw dental pain. Neck:  Denies injury or pain. Neuro:  Denies headache, LOC, dizziness, neurologic symptoms/deficits/paresthesias. Skin: Denies injury, rash, itching or skin changes. All other systems negative as reviewed. Visit Vitals    /71 (BP 1 Location: Left arm, BP Patient Position: At rest;Sitting)    Pulse 79    Temp 98.3 °F (36.8 °C)    Resp 18    Ht 5' 8\" (1.727 m)    Wt 78.9 kg (174 lb)    SpO2 100%    BMI 26.46 kg/m2       PHYSICAL EXAM:    CONSTITUTIONAL:  Alert, in no apparent distress;  well developed;  well nourished. HEAD:  Normocephalic, atraumatic. EYES:  EOMI. Non-icteric sclera. Normal conjunctiva. ENTM:  Mouth: mucous membranes moist, uvula midline, airway patent. NECK:  Supple  RESPIRATORY:  Chest clear, equal breath sounds, good air movement. CARDIOVASCULAR:  Regular rate and rhythm. No murmurs, rubs, or gallops. NEURO:  Moves all four extremities, and grossly normal motor exam.  SKIN:  No rashes;  Normal for age. PSYCH:  Alert and normal affect. Oropharynx/ teeth:  SUPERIOR/ADJACENT to tooth # 19 there is noted edema and 2mm open wound, without drainage. Oropharynx is otherwise normal and symmetric. IMPRESSION AND MEDICAL DECISION MAKING:  Based upon the patient's presentation with noted HPI and PE, along with the work up done in the emergency department, I believe that the patient is having noted abscess. Pt states the abscess drained. I am not able to express any purulence. Will have her continue penicillin and f/u with her dentist. Fetal heart tones WNL. Diagnosis:   1.  Dental abscess      Disposition: Discharge    Follow-up Information     Follow up With Details Comments [address] [place] [address] In 3 days  LázaroAmari Montejoedwina Bruno [address] EMERGENCY DEPT  If symptoms worsen [address] [facility]  [phone]          Patient's Medications   Start Taking    No medications on file   Continue Taking    ACETAMINOPHEN (TYLENOL) 325 MG TABLET    Take 2 Tabs by mouth every four (4) hours as needed for Pain. AMOXICILLIN 500 MG TAB    Take 500 mg by mouth three (3) times daily for 7 days. PRENATAL VIT-CALCIUM-IRON-FA (PRENATAL PLUS WITH CALCIUM) 27 MG IRON- 1 MG TAB    Take 1 Tab by mouth daily.    These Medications have changed    No medications on file   Stop Taking    No medications on file     CARLEY Almanzar

## 2018-04-20 NOTE — ED TRIAGE NOTES
Patient states she was seen last week for abscess in left lower gums. She has been on antibiotics. Abscess ruptures while she was at work so she just wants it checked.

## 2018-04-25 ENCOUNTER — ROUTINE PRENATAL (OUTPATIENT)
Dept: OBGYN CLINIC | Age: 38
End: 2018-04-25

## 2018-04-25 VITALS
OXYGEN SATURATION: 99 % | WEIGHT: 177.4 LBS | HEART RATE: 69 BPM | DIASTOLIC BLOOD PRESSURE: 60 MMHG | RESPIRATION RATE: 18 BRPM | SYSTOLIC BLOOD PRESSURE: 100 MMHG | BODY MASS INDEX: 26.89 KG/M2 | HEIGHT: 68 IN | TEMPERATURE: 97.7 F

## 2018-04-25 DIAGNOSIS — Z87.19 H/O DENTAL ABSCESS: ICD-10-CM

## 2018-04-25 DIAGNOSIS — Z34.82 PRENATAL CARE, SUBSEQUENT PREGNANCY IN SECOND TRIMESTER: Primary | ICD-10-CM

## 2018-04-25 DIAGNOSIS — O98.112 SYPHILIS AFFECTING PREGNANCY IN SECOND TRIMESTER: ICD-10-CM

## 2018-04-25 LAB
GTT, 1 HR, GLUCOLA, EXTERNAL: 101
HCT, EXTERNAL: 32.6
HGB, EXTERNAL: 11
PLATELET CNT,   EXTERNAL: 323

## 2018-04-25 NOTE — PATIENT INSTRUCTIONS
Weeks 22 to 26 of Your Pregnancy: Care Instructions  Your Care Instructions    As you enter your 7th month of pregnancy at week 26, your baby's lungs are growing stronger and getting ready to breathe. You may notice that your baby responds to the sound of your or your partner's voice. You may also notice that your baby does less turning and twisting and more squirming or jerking. Jerking often means that your baby has the hiccups. Hiccups are perfectly normal and are only temporary. You may want to think about attending a childbirth preparation class. This is also a good time to start thinking about whether you want to have pain medicine during labor. Most pregnant women are tested for gestational diabetes between weeks 25 and 28. Gestational diabetes occurs when your blood sugar level gets too high when you're pregnant. The test is important, because you can have gestational diabetes and not know it. But the condition can cause problems for your baby. Follow-up care is a key part of your treatment and safety. Be sure to make and go to all appointments, and call your doctor if you are having problems. It's also a good idea to know your test results and keep a list of the medicines you take. How can you care for yourself at home? Ease discomfort from your baby's kicking  · Change your position. Sometimes this will cause your baby to change position too. · Take a deep breath while you raise your arm over your head. Then breathe out while you drop your arm. Do Kegel exercises to prevent urine from leaking  · You can do Kegel exercises while you stand or sit. ¨ Squeeze the same muscles you would use to stop your urine. Your belly and thighs should not move. ¨ Hold the squeeze for 3 seconds, and then relax for 3 seconds. ¨ Start with 3 seconds. Then add 1 second each week until you are able to squeeze for 10 seconds. ¨ Repeat the exercise 10 to 15 times for each session.  Do three or more sessions each day.  Ease or reduce swelling in your feet, ankles, hands, and fingers  · If your fingers are puffy, take off your rings. · Do not eat high-salt foods, such as potato chips. · Prop up your feet on a stool or couch as much as possible. Sleep with pillows under your feet. · Do not stand for long periods of time or wear tight shoes. · Wear support stockings. Where can you learn more? Go to http://inocencia-hank.info/. Enter G264 in the search box to learn more about \"Weeks 22 to 26 of Your Pregnancy: Care Instructions. \"  Current as of: March 16, 2017  Content Version: 11.4  © 3187-1205 [employer]. Care instructions adapted under license by [employer] (which disclaims liability or warranty for this information). If you have questions about a medical condition or this instruction, always ask your healthcare professional. [place] [address] any warranty or liability for your use of this information. Learning About When to Call Your Doctor During Pregnancy (After 20 Weeks)  Your Care Instructions  It's common to have concerns about what might be a problem during pregnancy. Although most pregnant women don't have any serious problems, it's important to know when to call your doctor if you have certain symptoms or signs of labor. These are general suggestions. Your doctor may give you some more information about when to call. When to call your doctor (after 20 weeks)  Call 911 anytime you think you may need emergency care. For example, call if:  · You have severe vaginal bleeding. · You have sudden, severe pain in your belly. · You passed out (lost consciousness). · You have a seizure. · You see or feel the umbilical cord. · You think you are about to deliver your baby and can't make it safely to the hospital.  Call your doctor now or seek immediate medical care if:  · You have vaginal bleeding. · You have belly pain. · You have a fever.   · You have symptoms of preeclampsia, such as:  ¨ Sudden swelling of your face, hands, or feet. ¨ New vision problems (such as dimness or blurring). ¨ A severe headache. · You have a sudden release of fluid from your vagina. (You think your water broke.)  · You think that you may be in labor. This means that you've had at least 4 contractions within 20 minutes or at least 8 contractions in an hour. · You notice that your baby has stopped moving or is moving much less than normal.  · You have symptoms of a urinary tract infection. These may include:  ¨ Pain or burning when you urinate. ¨ A frequent need to urinate without being able to pass much urine. ¨ Pain in the flank, which is just below the rib cage and above the waist on either side of the back. ¨ Blood in your urine. Watch closely for changes in your health, and be sure to contact your doctor if:  · You have vaginal discharge that smells bad. · You have skin changes, such as:  ¨ A rash. ¨ Itching. ¨ Yellow color to your skin. · You have other concerns about your pregnancy. If you have labor signs at 37 weeks or more  If you have signs of labor at 37 weeks or more, your doctor may tell you to call when your labor becomes more active. Symptoms of active labor include:  · Contractions that are regular. · Contractions that are less than 5 minutes apart. · Contractions that are hard to talk through. Follow-up care is a key part of your treatment and safety. Be sure to make and go to all appointments, and call your doctor if you are having problems. It's also a good idea to know your test results and keep a list of the medicines you take. Where can you learn more? Go to http://inocencia-hank.info/. Enter  in the search box to learn more about \"Learning About When to Call Your Doctor During Pregnancy (After 20 Weeks). \"  Current as of: March 16, 2017  Content Version: 11.4  © 5752-0510 [employer].  Care instructions adapted under license by [employer] (which disclaims liability or warranty for this information). If you have questions about a medical condition or this instruction, always ask your healthcare professional. [address] any warranty or liability for your use of this information. Weeks 26 to 30 of Your Pregnancy: Care Instructions  Your Care Instructions    You are now in your last trimester of pregnancy. Your baby is growing rapidly. And you'll probably feel your baby moving around more often. Your doctor may ask you to count your baby's kicks. Your back may ache as your body gets used to your baby's size and length. If you haven't already had the Tdap shot during this pregnancy, talk to your doctor about getting it. It will help protect your  against pertussis infection. During this time, it's important to take care of yourself and pay attention to what your body needs. If you feel sexual, explore ways to be close with your partner that match your comfort and desire. Use the tips provided in this care sheet to find ways to be sexual in your own way. Follow-up care is a key part of your treatment and safety. Be sure to make and go to all appointments, and call your doctor if you are having problems. It's also a good idea to know your test results and keep a list of the medicines you take. How can you care for yourself at home? Take it easy at work  · Take frequent breaks. If possible, stop working when you are tired, and rest during your lunch hour. · Take bathroom breaks every 2 hours. · Change positions often. If you sit for long periods, stand up and walk around. · When you stand for a long time, keep one foot on a low stool with your knee bent. After standing a lot, sit with your feet up. · Avoid fumes, chemicals, and tobacco smoke. Be sexual in your own way  · Having sex during pregnancy is okay, unless your doctor tells you not to.   · You may be very interested in sex, or you may have no interest at all. · Your growing belly can make it hard to find a good position during intercourse. Alfred and explore. · You may get cramps in your uterus when your partner touches your breasts. · A back rub may relieve the backache or cramps that sometimes follow orgasm. Learn about  labor  · Watch for signs of  labor. You may be going into labor if:  ¨ You have menstrual-like cramps, with or without nausea. ¨ You have about 4 or more contractions in 20 minutes, or about 8 or more within 1 hour, even after you have had a glass of water and are resting. ¨ You have a low, dull backache that does not go away when you change your position. ¨ You have pain or pressure in your pelvis that comes and goes in a pattern. ¨ You have intestinal cramping or flu-like symptoms, with or without diarrhea. ¨ You notice an increase or change in your vaginal discharge. Discharge may be heavy, mucus-like, watery, or streaked with blood. ¨ Your water breaks. · If you think you have  labor:  ¨ Drink 2 or 3 glasses of water or juice. Not drinking enough fluids can cause contractions. ¨ Stop what you are doing, and empty your bladder. Then lie down on your left side for at least 1 hour. ¨ While lying on your side, find your breast bone. Put your fingers in the soft spot just below it. Move your fingers down toward your belly button to find the top of your uterus. Check to see if it is tight. ¨ Contractions can be weak or strong. Record your contractions for an hour. Time a contraction from the start of one contraction to the start of the next one. ¨ Single or several strong contractions without a pattern are called Dov-Samuel contractions. They are practice contractions but not the start of labor. They often stop if you change what you are doing. ¨ Call your doctor if you have regular contractions. Where can you learn more?   Go to http://inocencia-hank.info/. Enter U455 in the search box to learn more about \"Weeks 26 to 30 of Your Pregnancy: Care Instructions. \"  Current as of: March 16, 2017  Content Version: 11.4  © 3445-7735 [employer]. Care instructions adapted under license by [employer] (which disclaims liability or warranty for this information). If you have questions about a medical condition or this instruction, always ask your healthcare professional. [address] any warranty or liability for your use of this information. Late Syphilis: Care Instructions  Your Care Instructions  Syphilis is a sexually transmitted infection (STI) caused by bacteria. Rarely, syphilis can be spread by other means, such as in the blood by sharing needles to inject drugs. Pregnant women with syphilis can pass the infection to their babies. If syphilis is not treated in an early stage, symptoms may go away but the infection is still in the body. This is called latent syphilis. Later, the disease can turn into what is called late (or tertiary) syphilis. Late syphilis can damage different parts of the body, including the heart, nerves, and eyes. It can cause death. Treatment with antibiotics will kill the bacteria and may prevent further damage. You will need blood tests after treatment to make sure that the syphilis bacteria have been killed. You also may need treatment for problems caused by syphilis. During the first 24 hours of treatment, you may have a fever, a headache, and muscle aches. Follow-up care is a key part of your treatment and safety. Be sure to make and go to all appointments, and call your doctor if you are having problems. It's also a good idea to know your test results and keep a list of the medicines you take. How can you care for yourself at home? · Get your antibiotic shots or take your pills as directed. Do not stop them just because you feel better.  You need to take the full course of antibiotics. · Do not have sexual contact with anyone while you are being treated. Even if you use a condom, you and your partner may pass the infection back and forth. · Tell your sex partner or partners that you have syphilis. They should get treated, whether or not they have symptoms of infection. When should you call for help? Call your doctor now or seek immediate medical care if:  ? · You have a new fever that lasts more than 48 hours after you start treatment. ? · You have new numbness or tingling. ? · You have trouble thinking clearly. ? · You have a headache, stiff neck, or any changes in your vision or hearing. ? Watch closely for changes in your health, and be sure to contact your doctor if:  ? · You do not get better as expected. ? · Your symptoms continue or come back after treatment, or new symptoms develop. Where can you learn more? Go to http://inocencia-hank.info/. Enter Q103 in the search box to learn more about \"Late Syphilis: Care Instructions. \"  Current as of: March 20, 2017  Content Version: 11.4  © 6529-1214 [employer]. Care instructions adapted under license by [employer] (which disclaims liability or warranty for this information). If you have questions about a medical condition or this instruction, always ask your healthcare professional. [address] any warranty or liability for your use of this information.

## 2018-04-26 LAB
ERYTHROCYTE [DISTWIDTH] IN BLOOD BY AUTOMATED COUNT: 13.7 % (ref 10–15.5)
GESTATIONAL DIABETES SCREEN, 102285: 101 MCG/DL (ref 65–139)
HCT VFR BLD AUTO: 32.6 % (ref 35.1–46.5)
HGB BLD-MCNC: 11 G/DL (ref 11.7–15.5)
MCH RBC QN AUTO: 30 PG (ref 26–34)
MCHC RBC AUTO-ENTMCNC: 34 G/DL (ref 31–36)
MCV RBC AUTO: 88 FL (ref 80–95)
PLATELET # BLD AUTO: 323 K/UL (ref 140–440)
PMV BLD AUTO: 10.8 FL (ref 9–13)
RBC # BLD AUTO: 3.7 M/UL (ref 3.8–5.2)
WBC # BLD AUTO: 11 K/UL (ref 4–11)

## 2018-04-26 NOTE — PROGRESS NOTES
26w4d   Dated by 6 wk US  Limited prenatal care  Presents to the office for a routine prenatal visit. No OB complaints today  Seen by [facility] for AMA --> genetic screening normal  Also referred to [facility] for echogenic focus seen on anatomy scan--->per pt she has seen M for both her syphillus and US-->will obtain medical records  S/p tx for syphillus of unknown duration during this pregnancy  [facility]/ last dose 4/3 -->. Will recheck titers next visit. Seen in ED on 4/20 for dental abscess, start on amoxicillin, --->pt has an appointment with her dentist on 5/21/18, pt trying to get an earlier date.    1hr GTT today  Rh+  PTL precautions discussed  RTC in 2 weeks

## 2018-05-01 ENCOUNTER — DOCUMENTATION ONLY (OUTPATIENT)
Dept: OBGYN CLINIC | Age: 38
End: 2018-05-01

## 2018-05-02 NOTE — PROGRESS NOTES
Records from [facility] for MFM consultation 4/25 reviewed and noted for:   seen by MFM for recent syphilis s/p tx with Bacillin 3/28, 4/4, 4/10  Recommend checking RPR titers at 34 weeks,   Growth scan scheduled on 6/18  [facility] noted on anatomy scan 4/20-->no clinical significance in setting of NIPT negative

## 2018-05-09 ENCOUNTER — ROUTINE PRENATAL (OUTPATIENT)
Dept: OBGYN CLINIC | Age: 38
End: 2018-05-09

## 2018-05-09 VITALS
DIASTOLIC BLOOD PRESSURE: 63 MMHG | OXYGEN SATURATION: 100 % | SYSTOLIC BLOOD PRESSURE: 100 MMHG | HEIGHT: 68 IN | WEIGHT: 178.8 LBS | BODY MASS INDEX: 27.1 KG/M2 | TEMPERATURE: 97.9 F | RESPIRATION RATE: 18 BRPM | HEART RATE: 88 BPM

## 2018-05-09 DIAGNOSIS — Z34.83 PRENATAL CARE, SUBSEQUENT PREGNANCY IN THIRD TRIMESTER: ICD-10-CM

## 2018-05-09 DIAGNOSIS — O98.113 SYPHILIS IN PREGNANCY, ANTEPARTUM, THIRD TRIMESTER: Primary | ICD-10-CM

## 2018-05-09 LAB
RPR, EXTERNAL: <1.1
T. PALLIDUM, EXTERNAL: REACTIVE

## 2018-05-09 NOTE — PATIENT INSTRUCTIONS
Weeks 26 to 30 of Your Pregnancy: Care Instructions  Your Care Instructions    You are now in your last trimester of pregnancy. Your baby is growing rapidly. And you'll probably feel your baby moving around more often. Your doctor may ask you to count your baby's kicks. Your back may ache as your body gets used to your baby's size and length. If you haven't already had the Tdap shot during this pregnancy, talk to your doctor about getting it. It will help protect your  against pertussis infection. During this time, it's important to take care of yourself and pay attention to what your body needs. If you feel sexual, explore ways to be close with your partner that match your comfort and desire. Use the tips provided in this care sheet to find ways to be sexual in your own way. Follow-up care is a key part of your treatment and safety. Be sure to make and go to all appointments, and call your doctor if you are having problems. It's also a good idea to know your test results and keep a list of the medicines you take. How can you care for yourself at home? Take it easy at work  · Take frequent breaks. If possible, stop working when you are tired, and rest during your lunch hour. · Take bathroom breaks every 2 hours. · Change positions often. If you sit for long periods, stand up and walk around. · When you stand for a long time, keep one foot on a low stool with your knee bent. After standing a lot, sit with your feet up. · Avoid fumes, chemicals, and tobacco smoke. Be sexual in your own way  · Having sex during pregnancy is okay, unless your doctor tells you not to. · You may be very interested in sex, or you may have no interest at all. · Your growing belly can make it hard to find a good position during intercourse. [place] and explore. · You may get cramps in your uterus when your partner touches your breasts.   · A back rub may relieve the backache or cramps that sometimes follow orgasm. Learn about  labor  · Watch for signs of  labor. You may be going into labor if:  ¨ You have menstrual-like cramps, with or without nausea. ¨ You have about 4 or more contractions in 20 minutes, or about 8 or more within 1 hour, even after you have had a glass of water and are resting. ¨ You have a low, dull backache that does not go away when you change your position. ¨ You have pain or pressure in your pelvis that comes and goes in a pattern. ¨ You have intestinal cramping or flu-like symptoms, with or without diarrhea. ¨ You notice an increase or change in your vaginal discharge. Discharge may be heavy, mucus-like, watery, or streaked with blood. ¨ Your water breaks. · If you think you have  labor:  ¨ Drink 2 or 3 glasses of water or juice. Not drinking enough fluids can cause contractions. ¨ Stop what you are doing, and empty your bladder. Then lie down on your left side for at least 1 hour. ¨ While lying on your side, find your breast bone. Put your fingers in the soft spot just below it. Move your fingers down toward your belly button to find the top of your uterus. Check to see if it is tight. ¨ Contractions can be weak or strong. Record your contractions for an hour. Time a contraction from the start of one contraction to the start of the next one. ¨ Single or several strong contractions without a pattern are called Dov-Samuel contractions. They are practice contractions but not the start of labor. They often stop if you change what you are doing. ¨ Call your doctor if you have regular contractions. Where can you learn more? Go to http://inocencia-hank.info/. Enter J573 in the search box to learn more about \"Weeks 26 to 30 of Your Pregnancy: Care Instructions. \"  Current as of: 2017  Content Version: 11.4  © 0065-1273 [employer].  Care instructions adapted under license by [employer] (which disclaims liability or warranty for this information). If you have questions about a medical condition or this instruction, always ask your healthcare professional. [place] [address] any warranty or liability for your use of this information. Learning About When to Call Your Doctor During Pregnancy (After 20 Weeks)  Your Care Instructions  It's common to have concerns about what might be a problem during pregnancy. Although most pregnant women don't have any serious problems, it's important to know when to call your doctor if you have certain symptoms or signs of labor. These are general suggestions. Your doctor may give you some more information about when to call. When to call your doctor (after 20 weeks)  Call 911 anytime you think you may need emergency care. For example, call if:  · You have severe vaginal bleeding. · You have sudden, severe pain in your belly. · You passed out (lost consciousness). · You have a seizure. · You see or feel the umbilical cord. · You think you are about to deliver your baby and can't make it safely to the hospital.  Call your doctor now or seek immediate medical care if:  · You have vaginal bleeding. · You have belly pain. · You have a fever. · You have symptoms of preeclampsia, such as:  ¨ Sudden swelling of your face, hands, or feet. ¨ New vision problems (such as dimness or blurring). ¨ A severe headache. · You have a sudden release of fluid from your vagina. (You think your water broke.)  · You think that you may be in labor. This means that you've had at least 4 contractions within 20 minutes or at least 8 contractions in an hour. · You notice that your baby has stopped moving or is moving much less than normal.  · You have symptoms of a urinary tract infection. These may include:  ¨ Pain or burning when you urinate. ¨ A frequent need to urinate without being able to pass much urine.   ¨ Pain in the flank, which is just below the rib cage and above the waist on either side of the back. ¨ Blood in your urine. Watch closely for changes in your health, and be sure to contact your doctor if:  · You have vaginal discharge that smells bad. · You have skin changes, such as:  ¨ A rash. ¨ Itching. ¨ Yellow color to your skin. · You have other concerns about your pregnancy. If you have labor signs at 37 weeks or more  If you have signs of labor at 37 weeks or more, your doctor may tell you to call when your labor becomes more active. Symptoms of active labor include:  · Contractions that are regular. · Contractions that are less than 5 minutes apart. · Contractions that are hard to talk through. Follow-up care is a key part of your treatment and safety. Be sure to make and go to all appointments, and call your doctor if you are having problems. It's also a good idea to know your test results and keep a list of the medicines you take. Where can you learn more? Go to http://inocenciaWipithank.info/. Enter  in the search box to learn more about \"Learning About When to Call Your Doctor During Pregnancy (After 20 Weeks). \"  Current as of: March 16, 2017  Content Version: 11.4  © 0538-1572 [employer]. Care instructions adapted under license by [employer] (which disclaims liability or warranty for this information). If you have questions about a medical condition or this instruction, always ask your healthcare professional. [place] [address] any warranty or liability for your use of this information. Syphilis: Care Instructions  Your Care Instructions  Syphilis is an infection caused by bacteria. It's usually spread through sex. It is one of several types of sexually transmitted infections (STIs). The first symptom is usually a painless, red sore on the genitals, rectal area, or mouth. This type of sore is called a chancre (say \"SHANK-er\"). Later, you may get other symptoms.  These include a rash, a fever, and swollen lymph nodes. Your hair may start to fall out. Or you may feel like you have the flu. Sometimes these symptoms go away on their own. But this doesn't mean that the infection is gone. If you don't treat syphilis with antibiotics, the infection can spread in your body. You can also spread it to others. Antibiotics can cure syphilis and prevent more serious complications. Both you and your sex partner or partners need antibiotic treatment. This is to prevent you from passing the infection back and forth or to other sex partners. During the first 24 hours of treatment, you may have a fever, a headache, and muscle aches. After treatment, you will get blood tests to make sure you don't have any more bacteria in your body. You may also want to be tested for other STIs. Follow-up care is a key part of your treatment and safety. Be sure to make and go to all appointments, and call your doctor if you are having problems. It's also a good idea to know your test results and keep a list of the medicines you take. How can you care for yourself at home? · Your doctor probably gave you a shot of antibiotics. If you've had syphilis for a while, you may need 2 more shots. It's very important to get all the recommended shots. · If your doctor prescribed antibiotic pills, take them as directed. Do not stop taking them just because you feel better. You need to take the full course of antibiotics. · Do not have sexual contact with anyone while you are being treated. After treatment, wait at least 7 days and until all of your sores are healed before you have any sexual contact. Even if you use a condom, you and your partner may pass the infection back and forth. · Wash your hands if you touch an infected area. This will help prevent spreading the infection to other parts of your body or to other people. · Tell your sex partner or partners that you have syphilis.  They should get treatment even if they don't have symptoms. To prevent syphilis in the future  · Use latex condoms every time you have sex. Use them from the start to the end of sexual contact. · Talk to your partner before you have sex. Find out if he or she has or is at risk for syphilis or any other STI. Remember that a person without symptoms may still be able to spread an STI. · Do not have sex or any type of sexual contact if you are being treated for syphilis or any other STI. · Do not have sex with anyone who has symptoms of an STI. These include sores on the genitals or mouth. · Having one sex partner (who does not have STIs and does not have sex with anyone else) is a good way to avoid STIs. When should you call for help? Watch closely for changes in your health, and be sure to contact your doctor if:  ? · You do not get better as expected. ? · Your symptoms continue or come back after treatment. ? · You develop new symptoms, such as a fever. Where can you learn more? Go to http://inocencia-hank.info/. Enter Z000 in the search box to learn more about \"Syphilis: Care Instructions. \"  Current as of: March 20, 2017  Content Version: 11.4  © 0859-2846 [employer]. Care instructions adapted under license by [employer] (which disclaims liability or warranty for this information). If you have questions about a medical condition or this instruction, always ask your healthcare professional. [place] [address] any warranty or liability for your use of this information. Late Syphilis: Care Instructions  Your Care Instructions  Syphilis is a sexually transmitted infection (STI) caused by bacteria. Rarely, syphilis can be spread by other means, such as in the blood by sharing needles to inject drugs. Pregnant women with syphilis can pass the infection to their babies. If syphilis is not treated in an early stage, symptoms may go away but the infection is still in the body.  This is called latent syphilis. Later, the disease can turn into what is called late (or tertiary) syphilis. Late syphilis can damage different parts of the body, including the heart, nerves, and eyes. It can cause death. Treatment with antibiotics will kill the bacteria and may prevent further damage. You will need blood tests after treatment to make sure that the syphilis bacteria have been killed. You also may need treatment for problems caused by syphilis. During the first 24 hours of treatment, you may have a fever, a headache, and muscle aches. Follow-up care is a key part of your treatment and safety. Be sure to make and go to all appointments, and call your doctor if you are having problems. It's also a good idea to know your test results and keep a list of the medicines you take. How can you care for yourself at home? · Get your antibiotic shots or take your pills as directed. Do not stop them just because you feel better. You need to take the full course of antibiotics. · Do not have sexual contact with anyone while you are being treated. Even if you use a condom, you and your partner may pass the infection back and forth. · Tell your sex partner or partners that you have syphilis. They should get treated, whether or not they have symptoms of infection. When should you call for help? Call your doctor now or seek immediate medical care if:  ? · You have a new fever that lasts more than 48 hours after you start treatment. ? · You have new numbness or tingling. ? · You have trouble thinking clearly. ? · You have a headache, stiff neck, or any changes in your vision or hearing. ? Watch closely for changes in your health, and be sure to contact your doctor if:  ? · You do not get better as expected. ? · Your symptoms continue or come back after treatment, or new symptoms develop. Where can you learn more? Go to http://inocencia-hank.info/.   Enter Q103 in the search box to learn more about \"Late Syphilis: Care Instructions. \"  Current as of: March 20, 2017  Content Version: 11.4  © 7503-6992 [employer]. Care instructions adapted under license by [employer] (which disclaims liability or warranty for this information). If you have questions about a medical condition or this instruction, always ask your healthcare professional. [place] [address] any warranty or liability for your use of this information.

## 2018-05-11 LAB
RPR TITER, 18362: NORMAL TITER
SYPHILIS (T. PALLIDUM) IGG, 15809: REACTIVE

## 2018-05-18 ENCOUNTER — HOSPITAL ENCOUNTER (EMERGENCY)
Age: 38
Discharge: HOME | End: 2018-05-18
Attending: OBSTETRICS & GYNECOLOGY | Admitting: OBSTETRICS & GYNECOLOGY
Payer: MEDICAID

## 2018-05-18 ENCOUNTER — TELEPHONE (OUTPATIENT)
Dept: OBGYN CLINIC | Age: 38
End: 2018-05-18

## 2018-05-18 VITALS
TEMPERATURE: 98.6 F | DIASTOLIC BLOOD PRESSURE: 67 MMHG | WEIGHT: 181 LBS | HEART RATE: 69 BPM | HEIGHT: 68 IN | SYSTOLIC BLOOD PRESSURE: 110 MMHG | BODY MASS INDEX: 27.43 KG/M2 | RESPIRATION RATE: 17 BRPM

## 2018-05-18 PROBLEM — M54.9 BACK PAIN: Status: ACTIVE | Noted: 2018-05-18

## 2018-05-18 LAB
APPEARANCE UR: ABNORMAL
BILIRUB UR QL: NEGATIVE
COLOR UR: YELLOW
GLUCOSE UR QL STRIP.AUTO: NEGATIVE MG/DL
KETONES UR-MCNC: NEGATIVE MG/DL
LEUKOCYTE ESTERASE UR QL STRIP: ABNORMAL
NITRITE UR QL: NEGATIVE
PH UR: 7.5 [PH] (ref 5–9)
PROT UR QL: 30 MG/DL
RBC # UR STRIP: ABNORMAL /UL
SERVICE CMNT-IMP: ABNORMAL
SP GR UR: 1.02 (ref 1–1.02)
TREPONEMA PALLIDUM AB: REACTIVE
UROBILINOGEN UR QL: 2 EU/DL (ref 0.2–1)

## 2018-05-18 PROCEDURE — 81003 URINALYSIS AUTO W/O SCOPE: CPT

## 2018-05-18 PROCEDURE — 99283 EMERGENCY DEPT VISIT LOW MDM: CPT

## 2018-05-18 NOTE — PROGRESS NOTES
Patient arrives on unit via wheel chair for sharp shooting pain in her right side back. States it started two days ago when she woke up and has progressively been getting worse over the past two days. She has been taking Tylenol with no pain relief. TOCO and EFM applied with positive fetal heart tones. Patient feels more relief when laying on her left side. Had to be assisted by two persons from restroom as patient seems unable to put weight on her right side. 1015: Notified Dr Jimbo Rashid about patient status, if strip is reactive patient can be D/C home. Patient to try Benadryl at home to help her sleep. 1105: I have reviewed discharge instructions with the patient. The patient verbalized understanding. Patient left unit in wheel chair in no acute distress.

## 2018-05-18 NOTE — TELEPHONE ENCOUNTER
Received a call from the pt using two identifiers, name and . Pt stated that she has been having bad leg pain and sharp pains in her vaginal area for the the last 3 days. She also reports that she can hardly walk. Pt advised to go to L&D to be evaluated and to keep her scheduled apt. With our office. Pt verbalized understanding.

## 2018-05-18 NOTE — TELEPHONE ENCOUNTER
Patient called a 30 wk ob stating she having severe rt leg and back pain, unable to walk or stand on it.

## 2018-05-18 NOTE — IP AVS SNAPSHOT
Arin Hatfield 
 
 
 [address] [facility] ER Debi Elisashannon 17 Patient: Teetee Becerra MRN: [id number] RCA:0/5/2573 About your hospitalization You were admitted on:  N/A You last received care in the:  [facility] [id number] [place] [address] You were discharged on:  May 18, 2018 Why you were hospitalized Your primary diagnosis was:  Not on File Your diagnoses also included:  Back Pain Follow-up Information Follow up With Details Comments Contact Info MD Buzz [address] [facility] [place] [address] 
[phone] Your Scheduled Appointments Wednesday May 23, 2018 10:30 AM EDT  
OB VISIT with Savannah Fountain MD  
[facility] OB GYN ([address] [facility]) [address] [address]  
[phone] Discharge Orders None A check mallory indicates which time of day the medication should be taken. My Medications ASK your doctor about these medications Instructions Each Dose to Equal  
 Morning Noon Evening Bedtime  
 acetaminophen 325 mg tablet Commonly known as:  TYLENOL Your last dose was: Your next dose is: Take 2 Tabs by mouth every four (4) hours as needed for Pain. 650 mg  
    
   
   
   
  
 prenatal vit-calcium-iron-fa 27 mg iron- 1 mg Tab Commonly known as:  PRENATAL PLUS with CALCIUM Your last dose was: Your next dose is: Take 1 Tab by mouth daily. 1 Tab Discharge Instructions Prenatal Discharge Instructions Follow up appointment: As Scheduled Diet: Regular with lots of fluid Activity: Moderate with rest 
 
Medications: As scheduled. Call your physician or return to Labor and Delivery if any of the following symptoms occur: ? Signs of labor (contractions every 5-10 minutes for 1 hour) ? Vaginal bleeding ? Rupture of amniotic membranes (water breaks) ? Fever ? Decreased fetal movement (less than 5-10 movements in 1 hour) Introducing [facility]! [employer] introduces [employer] patient portal. Now you can access parts of your medical record, email your doctor's office, and request medication refills online. 1. In your internet browser, go to [email or web address] 2. Click on the First Time User? Click Here link in the Sign In box. You will see the New Member Sign Up page. 3. Enter your [employer] Access Code exactly as it appears below. You will not need to use this code after youve completed the sign-up process. If you do not sign up before the expiration date, you must request a new code. · [employer] Access Code: [email or web address] Expires: 6/12/2018  4:37 PM 
 
4. Enter the last four digits of your Social Security Number (xxxx) and Date of Birth (mm/dd/yyyy) as indicated and click Submit. You will be taken to the next sign-up page. 5. Create a [employer] ID. This will be your [employer] login ID and cannot be changed, so think of one that is secure and easy to remember. 6. Create a [employer] password. You can change your password at any time. 7. Enter your Password Reset Question and Answer. This can be used at a later time if you forget your password. 8. Enter your e-mail address. You will receive e-mail notification when new information is available in [address] 9. Click Sign Up. You can now view and download portions of your medical record. 10. Click the Download Summary menu link to download a portable copy of your medical information. If you have questions, please visit the Frequently Asked Questions section of the [employer] website. Remember, [employer] is NOT to be used for urgent needs. For medical emergencies, dial 911. Now available from your iPhone and Android! Introducing Stan Hunt As a Tiffany Pereira patient, I wanted to make you aware of our electronic visit tool called Stan [employer] 24/7 allows you to connect within minutes with a medical provider 24 hours a day, seven days a week via a mobile device or tablet or logging into a secure website from your computer. You can access [employer] from anywhere in the [place]. A virtual visit might be right for you when you have a simple condition and feel like you just dont want to get out of bed, or cant get away from work for an appointment, when your regular [facility] provider is not available (evenings, weekends or holidays), or when youre out of town and need minor care. Electronic visits cost only $49 and if the [employer] 24/7 provider determines a prescription is needed to treat your condition, one can be electronically transmitted to a nearby pharmacy*. Please take a moment to enroll today if you have not already done so. The enrollment process is free and takes just a few minutes. To enroll, please download the [employer] 24/7 mesfin to your tablet or phone, or visit [email or web address] to enroll on your computer. And, as an [address] [place] [address] patient with a [employer] account, the results of your visits will be scanned into your electronic medical record and your primary care provider will be able to view the scanned results. We urge you to continue to see your regular [facility] provider for your ongoing medical care. And while your primary care provider may not be the one available when you seek a Stan aHlefin virtual visit, the peace of mind you get from getting a real diagnosis real time can be priceless. For more information on Stan AVA.ailexfin, view our Frequently Asked Questions (FAQs) at [email or web address]. Sincerely, 
 
Danielle Moreland MD 
Chief Medical Officer Alireza Saldaña *:  certain medications cannot be prescribed via Stan Hunt Providers Seen During Your Hospitalization Provider Specialty Primary office phone Rui Novoa MD Obstetrics & Gynecology [phone] Your Primary Care Physician (PCP) Primary Care Physician Office Phone Office Fax Janice Brian [phone] You are allergic to the following No active allergies Recent Documentation Height Weight BMI OB Status Smoking Status 1.727 m 82.1 kg 27.52 kg/m2 Pregnant Never Smoker Emergency Contacts Name Discharge Info Relation Home Work Mobile [address] CAREGIVER [3] Mother [14] [phone] Patient Belongings The following personal items are in your possession at time of discharge: 
                             
 
  
  
Discharge Instructions Attachments/References PREGNANCY: BACK PAIN (ENGLISH) PREGNANCY: WEEKS 30 TO 32 (ENGLISH) Patient Handouts Backache During Pregnancy: Care Instructions Your Care Instructions Back pain has many possible causes. It is often caused by problems with muscles and ligaments in your back. The extra weight during pregnancy can put stress on your back. Moving, lifting, standing, sitting, or sleeping in an awkward way also can strain your back. Back pain can also be a sign of labor. Although it may hurt a lot, back pain often improves on its own. Use good home treatment, and take care not to stress your back. Follow-up care is a key part of your treatment and safety. Be sure to make and go to all appointments, and call your doctor if you are having problems. It's also a good idea to know your test results and keep a list of the medicines you take. How can you care for yourself at home? · Ask your doctor about taking acetaminophen (Tylenol) for pain. Do not take aspirin, ibuprofen (Advil, Motrin), or naproxen (Aleve). · Do not take two or more pain medicines at the same time unless the doctor told you to. Many pain medicines have acetaminophen, which is Tylenol. Too much acetaminophen (Tylenol) can be harmful. · Lie on your side with your knees and hips bent and a pillow between your legs. This reduces stress on your back. · Put ice or cold packs on your back for 10 to 20 minutes at a time, several times a day. Put a thin cloth between the ice and your skin. · Warm baths may also help reduce pain. · Change positions every 30 minutes. Take breaks if you must sit for a long time. Get up and walk around. · Ask your doctor about how much exercise you can do. You may feel better taking short walks or doing gentle movements and stretching in a swimming pool. · Ask your doctor about exercises to stretch and strengthen your back. When should you call for help? Call your doctor now or seek immediate medical care if: 
? · You think you are in labor. ? · You have new numbness in your buttocks, genital or rectal areas, or legs. ? · You have a new loss of bowel or bladder control. ? Watch closely for changes in your health, and be sure to contact your doctor if: 
? · You do not get better as expected. Where can you learn more? Go to http://inocencia-hank.info/. Enter K540 in the search box to learn more about \"Backache During Pregnancy: Care Instructions. \" Current as of: March 16, 2017 Content Version: 11.4 © 7592-2419 [employer]. Care instructions adapted under license by [employer] (which disclaims liability or warranty for this information). If you have questions about a medical condition or this instruction, always ask your healthcare professional. [place] [address] any warranty or liability for your use of this information. Weeks 30 to 32 of Your Pregnancy: Care Instructions Your Care Instructions You have made it to the final months of your pregnancy. By now, your baby is really starting to look like a baby, with hair and plump skin. As you enter the final weeks of pregnancy, the reality of having a baby may start to set in. This is the time to settle on a name, get your household in order, set up a safe nursery, and find quality  if needed. Doing these things in advance will allow you to focus on caring for and enjoying your new baby. You may also want to have a tour of your hospital's labor and delivery unit to get a better idea of what to expect while you are in the hospital. 
During these last months, it is very important to take good care of yourself and pay attention to what your body needs. If your doctor says it is okay for you to work, don't push yourself too hard. Use the tips provided in this care sheet to ease heartburn and care for varicose veins. If you haven't already had the Tdap shot during this pregnancy, talk to your doctor about getting it. It will help protect your  against pertussis infection. Follow-up care is a key part of your treatment and safety. Be sure to make and go to all appointments, and call your doctor if you are having problems. It's also a good idea to know your test results and keep a list of the medicines you take. How can you care for yourself at home? Pay attention to your baby's movements · You should feel your baby move several times every day. · Your baby now turns less, and kicks and jabs more. · Your baby sleeps 20 to 45 minutes at a time and is more active at certain times of day. · If your doctor wants you to count your baby's kicks: 
¨ Empty your bladder, and lie on your side or relax in a comfortable chair. ¨ Write down your start time. ¨ Pay attention only to your baby's movements. Count any movement except hiccups. ¨ After you have counted 10 movements, write down your stop time. ¨ Write down how many minutes it took for your baby to move 10 times. ¨ If an hour goes by and you have not recorded 10 movements, have something to eat or drink and then count for another hour. If you do not record 10 movements in either hour, call your doctor. Ease heartburn · Eat small, frequent meals. · Do not eat chocolate, peppermint, or very spicy foods. Avoid drinks with caffeine, such as coffee, tea, and sodas. · Avoid bending over or lying down after meals. · Talk a short walk after you eat. · If heartburn is a problem at night, do not eat for 2 hours before bedtime. · Take antacids like Mylanta, Maalox, Rolaids, or Tums. Do not take antacids that have sodium bicarbonate. Care for varicose veins · Varicose veins are blood vessels that stretch out with the extra blood during pregnancy. Your legs may ache or throb. Most varicose veins will go away after the birth. · Avoid standing for long periods of time. Sit with your legs crossed at the ankles, not the knees. · Sit with your feet propped up. · Avoid tight clothing or stockings. Wear support hose. · Exercise regularly. Try walking for at least 30 minutes a day. Where can you learn more? Go to http://inocencia-hank.info/. Enter D057 in the search box to learn more about \"Weeks 30 to 32 of Your Pregnancy: Care Instructions. \" Current as of: March 16, 2017 Content Version: 11.4 © 3344-6892 [employer]. Care instructions adapted under license by [employer]Â® (which disclaims liability or warranty for this information). If you have questions about a medical condition or this instruction, always ask your healthcare professional. [place] [address] any warranty or liability for your use of this information. Please provide this summary of care documentation to your next provider.  
  
  
 
  
Signatures-by signing, you are acknowledging that this After Visit Summary has been reviewed with you and you have received a copy. Patient Signature:  ____________________________________________________________ Date:  ____________________________________________________________  
  
Jerral [place] Provider Signature:  ____________________________________________________________ Date:  ____________________________________________________________

## 2018-05-18 NOTE — IP AVS SNAPSHOT
Summary of Care Report The Summary of Care report has been created to help improve care coordination. Users with access to Veebox or [facility] (Web-based application) may access additional patient information including the Discharge Summary. If you are not currently a [address] user and need more information, please call the number listed below in the [place] [address] section and ask to be connected with Medical Records. Facility Information Name Address Phone [address] [facility] [address] [facility] [address] [phone] Patient Information Patient Name Sex GIOVANY Hope ([id number]) Female 1980 Discharge Information Admitting Provider Service Area Unit Jessica Stuart MD / [address] [place] [address] Labor & Delivery / [phone] Discharge Provider Discharge Date/Time Discharge Disposition Destination (none) 2018 Midday (Pending) AHR (none) Patient Language Language ENGLISH [13] Hospital Problems as of 2018  Reviewed: 3/14/2018  9:50 AM by Bakari Montemayor CNM Class Noted - Resolved Last Modified POA Active Problems Back pain  2018 - Present 2018 by Jessica Stuart MD Unknown Entered by Jessica Stuart MD  
  
Non-Hospital Problems as of 2018  Reviewed: 3/14/2018  9:50 AM by Bakari Montemayor CNM Class Noted - Resolved Last Modified Active Problems 9 weeks gestation of pregnancy  2017 - Present 2017 by Orville Loo MD  
  Entered by Orville Loo MD  
  
You are allergic to the following No active allergies Current Discharge Medication List  
  
ASK your doctor about these medications Dose & Instructions Dispensing Information Comments  
 acetaminophen 325 mg tablet Commonly known as:  TYLENOL Dose:  650 mg Take 2 Tabs by mouth every four (4) hours as needed for Pain. Quantity:  20 Tab Refills:  0  
   
 prenatal vit-calcium-iron-fa 27 mg iron- 1 mg Tab Commonly known as:  PRENATAL PLUS with CALCIUM Dose:  1 Tab Take 1 Tab by mouth daily. Quantity:  60 Tab Refills:  2 Current Immunizations Name Date Influenza Vaccine Split 10/11/2010 Follow-up Information Follow up With Details Comments Contact Info Cynthia Nagy MD   [place] [address] [address] [address] [phone] Discharge Instructions Prenatal Discharge Instructions Follow up appointment: As Scheduled Diet: Regular with lots of fluid Activity: Moderate with rest 
 
Medications: As scheduled. Call your physician or return to Labor and Delivery if any of the following symptoms occur: ? Signs of labor (contractions every 5-10 minutes for 1 hour) ? Vaginal bleeding ? Rupture of amniotic membranes (water breaks) ? Fever ? Decreased fetal movement (less than 5-10 movements in 1 hour) Chart Review Routing History No Routing History on File

## 2018-05-18 NOTE — DISCHARGE INSTRUCTIONS
Prenatal Discharge Instructions  Follow up appointment: As Scheduled    Diet: Regular with lots of fluid    Activity: Moderate with rest    Medications: As scheduled.     Call your physician or return to Labor and Delivery if any of the following symptoms occur:   Signs of labor (contractions every 5-10 minutes for 1 hour)   Vaginal bleeding   Rupture of amniotic membranes (water breaks)   Fever   Decreased fetal movement (less than 5-10 movements in 1 hour)

## 2018-05-18 NOTE — IP AVS SNAPSHOT
[address] Debi Hein Patient: Hakeem Nascimento MRN: [id number] VALERIE:[birth year] A check mallory indicates which time of day the medication should be taken. My Medications ASK your doctor about these medications Instructions Each Dose to Equal  
 Morning Noon Evening Bedtime  
 acetaminophen 325 mg tablet Commonly known as:  TYLENOL Your last dose was: Your next dose is: Take 2 Tabs by mouth every four (4) hours as needed for Pain. 650 mg  
    
   
   
   
  
 prenatal vit-calcium-iron-fa 27 mg iron- 1 mg Tab Commonly known as:  PRENATAL PLUS with CALCIUM Your last dose was: Your next dose is: Take 1 Tab by mouth daily. 1 Tab

## 2018-05-23 ENCOUNTER — ROUTINE PRENATAL (OUTPATIENT)
Dept: OBGYN CLINIC | Age: 38
End: 2018-05-23

## 2018-05-23 VITALS
RESPIRATION RATE: 18 BRPM | DIASTOLIC BLOOD PRESSURE: 69 MMHG | HEART RATE: 77 BPM | OXYGEN SATURATION: 98 % | WEIGHT: 179.2 LBS | HEIGHT: 68 IN | TEMPERATURE: 97.8 F | BODY MASS INDEX: 27.16 KG/M2 | SYSTOLIC BLOOD PRESSURE: 102 MMHG

## 2018-05-23 DIAGNOSIS — Z34.83 PRENATAL CARE, SUBSEQUENT PREGNANCY IN THIRD TRIMESTER: Primary | ICD-10-CM

## 2018-05-23 DIAGNOSIS — M54.30 SCIATIC LEG PAIN: ICD-10-CM

## 2018-05-23 DIAGNOSIS — Z23 NEED FOR TDAP VACCINATION: ICD-10-CM

## 2018-05-23 NOTE — PATIENT INSTRUCTIONS
Sciatica: Care Instructions  Your Care Instructions    Sciatica (say \"qsj-BR-ab-kuh\") is an irritation of one of the sciatic nerves, which come from the spinal cord in the lower back. The sciatic nerves and their branches extend down through the buttock to the foot. Sciatica can develop when an injured disc in the back presses against a spinal nerve root. Its main symptom is pain, numbness, or weakness that is often worse in the leg or foot than in the back. Sciatica often will improve and go away with time. Early treatment usually includes medicines and exercises to relieve pain. Follow-up care is a key part of your treatment and safety. Be sure to make and go to all appointments, and call your doctor if you are having problems. It's also a good idea to know your test results and keep a list of the medicines you take. How can you care for yourself at home? · Take pain medicines exactly as directed. ¨ If the doctor gave you a prescription medicine for pain, take it as prescribed. ¨ If you are not taking a prescription pain medicine, ask your doctor if you can take an over-the-counter medicine. · Use heat or ice to relieve pain. ¨ To apply heat, put a warm water bottle, heating pad set on low, or warm cloth on your back. Do not go to sleep with a heating pad on your skin. ¨ To use ice, put ice or a cold pack on the area for 10 to 20 minutes at a time. Put a thin cloth between the ice and your skin. · Avoid sitting if possible, unless it feels better than standing. · Alternate lying down with short walks. Increase your walking distance as you are able to without making your symptoms worse. · Do not do anything that makes your symptoms worse. When should you call for help? Call 911 anytime you think you may need emergency care. For example, call if:  · You are unable to move a leg at all.   Call your doctor now or seek immediate medical care if:  · You have new or worse symptoms in your legs or buttocks. Symptoms may include:  ¨ Numbness or tingling. ¨ Weakness. ¨ Pain. · You lose bladder or bowel control. Watch closely for changes in your health, and be sure to contact your doctor if:  · You are not getting better as expected. Where can you learn more? Go to http://inocencia-hank.info/. Enter [phone] in the search box to learn more about \"Sciatica: Care Instructions. \"  Current as of: March 21, 2017  Content Version: 11.4  © 5660-4419 [employer]. Care instructions adapted under license by [employer] (which disclaims liability or warranty for this information). If you have questions about a medical condition or this instruction, always ask your healthcare professional. [address] any warranty or liability for your use of this information. Sciatica: Exercises  Your Care Instructions  Here are some examples of typical rehabilitation exercises for your condition. Start each exercise slowly. Ease off the exercise if you start to have pain. Your doctor or physical therapist will tell you when you can start these exercises and which ones will work best for you. When you are not being active, find a comfortable position for rest. Some people are comfortable on the floor or a medium-firm bed with a small pillow under their head and another under their knees. Some people prefer to lie on their side with a pillow between their knees. Don't stay in one position for too long. Take short walks (10 to 20 minutes) every 2 to 3 hours. Avoid slopes, hills, and stairs until you feel better. Walk only distances you can manage without pain, especially leg pain. How to do the exercises  Back stretches    1. Get down on your hands and knees on the floor. 2. Relax your head and allow it to droop. Round your back up toward the ceiling until you feel a nice stretch in your upper, middle, and lower back.  Hold this stretch for as long as it feels comfortable, or about 15 to 30 seconds. 3. Return to the starting position with a flat back while you are on your hands and knees. 4. Let your back sway by pressing your stomach toward the floor. Lift your buttocks toward the ceiling. 5. Hold this position for 15 to 30 seconds. 6. Repeat 2 to 4 times. Follow-up care is a key part of your treatment and safety. Be sure to make and go to all appointments, and call your doctor if you are having problems. It's also a good idea to know your test results and keep a list of the medicines you take. Where can you learn more? Go to http://inocencia-hank.info/. Enter Z915 in the search box to learn more about \"Sciatica: Exercises. \"  Current as of: March 21, 2017  Content Version: 11.4  © 0240-2903 [employer]. Care instructions adapted under license by [employer] (which disclaims liability or warranty for this information). If you have questions about a medical condition or this instruction, always ask your healthcare professional. [place] [address] any warranty or liability for your use of this information. Backache During Pregnancy: Care Instructions  Your Care Instructions    Back pain has many possible causes. It is often caused by problems with muscles and ligaments in your back. The extra weight during pregnancy can put stress on your back. Moving, lifting, standing, sitting, or sleeping in an awkward way also can strain your back. Back pain can also be a sign of labor. Although it may hurt a lot, back pain often improves on its own. Use good home treatment, and take care not to stress your back. Follow-up care is a key part of your treatment and safety. Be sure to make and go to all appointments, and call your doctor if you are having problems. It's also a good idea to know your test results and keep a list of the medicines you take. How can you care for yourself at home?   · Ask your doctor about taking acetaminophen (Tylenol) for pain. Do not take aspirin, ibuprofen (Advil, Motrin), or naproxen (Aleve). · Do not take two or more pain medicines at the same time unless the doctor told you to. Many pain medicines have acetaminophen, which is Tylenol. Too much acetaminophen (Tylenol) can be harmful. · Lie on your side with your knees and hips bent and a pillow between your legs. This reduces stress on your back. · Put ice or cold packs on your back for 10 to 20 minutes at a time, several times a day. Put a thin cloth between the ice and your skin. · Warm baths may also help reduce pain. · Change positions every 30 minutes. Take breaks if you must sit for a long time. Get up and walk around. · Ask your doctor about how much exercise you can do. You may feel better taking short walks or doing gentle movements and stretching in a swimming pool. · Ask your doctor about exercises to stretch and strengthen your back. When should you call for help? Call your doctor now or seek immediate medical care if:  ? · You think you are in labor. ? · You have new numbness in your buttocks, genital or rectal areas, or legs. ? · You have a new loss of bowel or bladder control. ? Watch closely for changes in your health, and be sure to contact your doctor if:  ? · You do not get better as expected. Where can you learn more? Go to http://inocencia-hank.info/. Enter Q892 in the search box to learn more about \"Backache During Pregnancy: Care Instructions. \"  Current as of: March 16, 2017  Content Version: 11.4  © 2699-5533 [employer]. Care instructions adapted under license by [employer] (which disclaims liability or warranty for this information). If you have questions about a medical condition or this instruction, always ask your healthcare professional. [place] [address] any warranty or liability for your use of this information.        Weeks 30 to 32 of Your Pregnancy: Care Instructions  Your Care Instructions    You have made it to the final months of your pregnancy. By now, your baby is really starting to look like a baby, with hair and plump skin. As you enter the final weeks of pregnancy, the reality of having a baby may start to set in. This is the time to settle on a name, get your household in order, set up a safe nursery, and find quality  if needed. Doing these things in advance will allow you to focus on caring for and enjoying your new baby. You may also want to have a tour of your hospital's labor and delivery unit to get a better idea of what to expect while you are in the hospital.  During these last months, it is very important to take good care of yourself and pay attention to what your body needs. If your doctor says it is okay for you to work, don't push yourself too hard. Use the tips provided in this care sheet to ease heartburn and care for varicose veins. If you haven't already had the Tdap shot during this pregnancy, talk to your doctor about getting it. It will help protect your  against pertussis infection. Follow-up care is a key part of your treatment and safety. Be sure to make and go to all appointments, and call your doctor if you are having problems. It's also a good idea to know your test results and keep a list of the medicines you take. How can you care for yourself at home? Pay attention to your baby's movements  · You should feel your baby move several times every day. · Your baby now turns less, and kicks and jabs more. · Your baby sleeps 20 to 45 minutes at a time and is more active at certain times of day. · If your doctor wants you to count your baby's kicks:  ¨ Empty your bladder, and lie on your side or relax in a comfortable chair. ¨ Write down your start time. ¨ Pay attention only to your baby's movements. Count any movement except hiccups.   ¨ After you have counted 10 movements, write down your stop time. ¨ Write down how many minutes it took for your baby to move 10 times. ¨ If an hour goes by and you have not recorded 10 movements, have something to eat or drink and then count for another hour. If you do not record 10 movements in either hour, call your doctor. Ease heartburn  · Eat small, frequent meals. · Do not eat chocolate, peppermint, or very spicy foods. Avoid drinks with caffeine, such as coffee, tea, and sodas. · Avoid bending over or lying down after meals. · Talk a short walk after you eat. · If heartburn is a problem at night, do not eat for 2 hours before bedtime. · Take antacids like Mylanta, Maalox, Rolaids, or Tums. Do not take antacids that have sodium bicarbonate. Care for varicose veins  · Varicose veins are blood vessels that stretch out with the extra blood during pregnancy. Your legs may ache or throb. Most varicose veins will go away after the birth. · Avoid standing for long periods of time. Sit with your legs crossed at the ankles, not the knees. · Sit with your feet propped up. · Avoid tight clothing or stockings. Wear support hose. · Exercise regularly. Try walking for at least 30 minutes a day. Where can you learn more? Go to http://inocencia-hank.info/. Enter S207 in the search box to learn more about \"Weeks 30 to 32 of Your Pregnancy: Care Instructions. \"  Current as of: March 16, 2017  Content Version: 11.4  © 5018-6368 [employer]. Care instructions adapted under license by [employer] (which disclaims liability or warranty for this information). If you have questions about a medical condition or this instruction, always ask your healthcare professional. [place] [address] any warranty or liability for your use of this information.        Learning About When to Call Your Doctor During Pregnancy (After 20 Weeks)  Your Care Instructions  It's common to have concerns about what might be a problem during pregnancy. Although most pregnant women don't have any serious problems, it's important to know when to call your doctor if you have certain symptoms or signs of labor. These are general suggestions. Your doctor may give you some more information about when to call. When to call your doctor (after 20 weeks)  Call 911 anytime you think you may need emergency care. For example, call if:  · You have severe vaginal bleeding. · You have sudden, severe pain in your belly. · You passed out (lost consciousness). · You have a seizure. · You see or feel the umbilical cord. · You think you are about to deliver your baby and can't make it safely to the hospital.  Call your doctor now or seek immediate medical care if:  · You have vaginal bleeding. · You have belly pain. · You have a fever. · You have symptoms of preeclampsia, such as:  ¨ Sudden swelling of your face, hands, or feet. ¨ New vision problems (such as dimness or blurring). ¨ A severe headache. · You have a sudden release of fluid from your vagina. (You think your water broke.)  · You think that you may be in labor. This means that you've had at least 4 contractions within 20 minutes or at least 8 contractions in an hour. · You notice that your baby has stopped moving or is moving much less than normal.  · You have symptoms of a urinary tract infection. These may include:  ¨ Pain or burning when you urinate. ¨ A frequent need to urinate without being able to pass much urine. ¨ Pain in the flank, which is just below the rib cage and above the waist on either side of the back. ¨ Blood in your urine. Watch closely for changes in your health, and be sure to contact your doctor if:  · You have vaginal discharge that smells bad. · You have skin changes, such as:  ¨ A rash. ¨ Itching. ¨ Yellow color to your skin. · You have other concerns about your pregnancy.   If you have labor signs at 37 weeks or more  If you have signs of labor at 37 weeks or more, your doctor may tell you to call when your labor becomes more active. Symptoms of active labor include:  · Contractions that are regular. · Contractions that are less than 5 minutes apart. · Contractions that are hard to talk through. Follow-up care is a key part of your treatment and safety. Be sure to make and go to all appointments, and call your doctor if you are having problems. It's also a good idea to know your test results and keep a list of the medicines you take. Where can you learn more? Go to http://inocencia-hank.info/. Enter  in the search box to learn more about \"Learning About When to Call Your Doctor During Pregnancy (After 20 Weeks). \"  Current as of: March 16, 2017  Content Version: 11.4  © 2962-2943 [employer]. Care instructions adapted under license by [employer] (which disclaims liability or warranty for this information). If you have questions about a medical condition or this instruction, always ask your healthcare professional. [place] [address] any warranty or liability for your use of this information. Counting Your Baby's Kicks: Care Instructions  Your Care Instructions    Counting your baby's kicks is one way your doctor can tell that your baby is healthy. Most women-especially in a first pregnancy-feel their baby move for the first time between 16 and 22 weeks. The movement may feel like flutters rather than kicks. Your baby may move more at certain times of the day. When you are active, you may notice less kicking than when you are resting. At your prenatal visits, your doctor will ask whether the baby is active. In your last trimester, your doctor may ask you to count the number of times you feel your baby move. Follow-up care is a key part of your treatment and safety. Be sure to make and go to all appointments, and call your doctor if you are having problems.  It's also a good idea to know your test results and keep a list of the medicines you take. How do you count fetal kicks? · A common method of checking your baby's movement is to count the number of kicks or moves you feel in 1 hour. Ten movements (such as kicks, flutters, or rolls) in 1 hour are normal. Some doctors suggest that you count in the morning until you get to 10 movements. Then you can quit for that day and start again the next day. · Pick your baby's most active time of day to count. This may be any time from morning to evening. · If you do not feel 10 movements in an hour, your baby may be sleeping. Wait for the next hour and count again. When should you call for help? Call your doctor now or seek immediate medical care if:  ? · You noticed that your baby has stopped moving or is moving much less than normal.   ? Watch closely for changes in your health, and be sure to contact your doctor if you have any problems. Where can you learn more? Go to http://inocencia-hank.info/. Enter X524 in the search box to learn more about \"Counting Your Baby's Kicks: Care Instructions. \"  Current as of: March 16, 2017  Content Version: 11.4  © 1559-9442 [employer]. Care instructions adapted under license by [employer] (which disclaims liability or warranty for this information). If you have questions about a medical condition or this instruction, always ask your healthcare professional. [place] [address] any warranty or liability for your use of this information.

## 2018-05-23 NOTE — PROGRESS NOTES
30w4d   Dated by 6 wk US  Limited prenatal care  For a routine prenatal visit. No OB complaints today  C/o shooting pain down starting at her buttocks down her leg, with mild numbness and tingling. Was seen on L&D 5/19 for similar problems. +straight leg test--->likely sciatic pain-->pt given information on different exercises to do, will also refer to PT. Pt states its slowly getting better, unable to walk last week.    Seen by MFM for AMA --> genetic screening normal  seen by MFM for recent syphilis s/p tx with Bacillin at the [facility] 3/28, 4/4, 4/10--> repeat titer wnl  ICEF noted on anatomy scan 4/20-->no clinical significance in setting of NIPT negative  Seen in ED on 4/20 for dental abscess, started on amoxicillin, --->pt has an appointment with her dentist on 5/21/18,   Rh+  Tdap today  PTL precautions discussed  RTC in 2 weeks

## 2018-05-29 ENCOUNTER — TELEPHONE (OUTPATIENT)
Dept: OBGYN CLINIC | Age: 38
End: 2018-05-29

## 2018-05-29 DIAGNOSIS — M54.30 SCIATICA WITHOUT BACK PAIN, UNSPECIFIED LATERALITY: Primary | ICD-10-CM

## 2018-05-29 DIAGNOSIS — M54.30 SCIATIC NERVE PAIN, UNSPECIFIED LATERALITY: Primary | ICD-10-CM

## 2018-05-29 NOTE — TELEPHONE ENCOUNTER
Patient identified using name and . Call to patient regarding specifics for her FMLA paperwork. Patient needs intermittent leave for sciatic symptoms, will follow up on PT consult for patient as well so that patient can have treatment to avoid missing more work than necessary.

## 2018-06-04 ENCOUNTER — HOSPITAL ENCOUNTER (OUTPATIENT)
Dept: PHYSICAL THERAPY | Age: 38
Discharge: HOME | End: 2018-06-04
Payer: MEDICAID

## 2018-06-04 PROCEDURE — 97110 THERAPEUTIC EXERCISES: CPT

## 2018-06-04 PROCEDURE — 97140 MANUAL THERAPY 1/> REGIONS: CPT

## 2018-06-04 PROCEDURE — 97161 PT EVAL LOW COMPLEX 20 MIN: CPT

## 2018-06-04 PROCEDURE — 97112 NEUROMUSCULAR REEDUCATION: CPT

## 2018-06-04 NOTE — H&P
C/O PAIN IN SIDE. DENIES CTX. NL FM.  NO LOF  AFVSS  GEN NAD  NST 150S REACTIVE  TOCO NO CTX   29 WEEKS WITH DISCOMFORTS OF PREGNANCY, ABDOMINAL PAIN  REACTIVE NST  D/C HOME  PTL INSTRUCTIONS

## 2018-06-04 NOTE — PROGRESS NOTES
PT DAILY TREATMENT NOTE/LUMBAR EVAL 3-16    Patient Name: Delmar Allen  Date:2018  : 1980  [x]  Patient  Verified  Payor: Frandy Chandler / [place]: [place] / Product Type: Managed Care Medicaid /    In time:5:16  Out time:6:26  Total Treatment Time (min): 70  Visit #: 1 of 10    Treatment Area: Sciatica, unspecified side [M54.30]  SUBJECTIVE  Pain Level (0-10 scale): 5-6  [x]constant []intermittent []improving []worsening []no change since onset    Any medication changes, allergies to medications, adverse drug reactions, diagnosis change, or new procedure performed?: [x] No    [] Yes (see summary sheet for update)  Subjective functional status/changes:     Subjective:  Pt is an [de-identified] month pregnant female with c/o right hip/glute for the past 2.5 weeks causing pain with walking, lying on right side, moving right leg laterally        Chief Complaint/: Right buttocks pain that radiates into the low back and mid posterior thigh    Worse with:  Walking, lying on right side    Better with:  Sitting in the hot water    Onset: 2.5 weeks ago    Mechanism of Injury: unsure    PMHx/Surgical Hx: OA, Pregnant    PLOF: No back or right buttocks pain, Good balance    Goal: Go back to work and not have the pain so that I can walk straight    FOTO: 33 64 in 11 visits    What type of work do you do? [employer] at [employer] [address] [employer]: Live at home with children    What type of daily activities/hobbies?  None just work    Limitations to Activity/PLOF: Limited walking, decreased balance, Have not tried to lift anything     Current Health Status:  Good    Barriers: [x]pain []financial []time []transportation []other    Motivation: high    Substance use: []Alcohol []Tobacco []other:     FABQ Score: []low []elevate    Cognition: A & O x 3    Other:    Risk For Falls:   []No  []low []elevate     Balance:  Fair        OBJECTIVE/EXAMINATION  - Gait: Right antalgic with guarding at the right side  - - Limited Hip flx with ambulation  - - (B) Toe out with decreased stability/balance with ambulation possibly due to right hip pain  -  Significant muscle tightness right superior glute and piriformis  - (+) Right Piriformis test  - (+) Right slump test  - Pain with movement of right LE into hip Flx   - Tight (B) HS, HS 90/90 Right 145*        20 min [x]Eval                  []Re-Eval       10 min Therapeutic Exercise:  [] See flow sheet : Develop and instruct HEP   Rationale: increase ROM, increase strength and improve coordination to improve the patients ability to tolerate increased activity levels    16 min Neuromuscular Re-education:  []  See flow sheet : KT I-Strip for spacing at the Right Superior glute, Pt Edu on use of KT to inhibit tissue tightness and decrease pain, Pt edu on Sciatica/piriformis syndrome and neural tension    Rationale: increase ROM, increase strength, improve coordination and Inhibit tissue tightness and modulate pain  to improve the patients ability to tolerate increased activity levels    24 min Manual Therapy:  STM/DTM Right Glute/piriformis region   Rationale: decrease pain, increase ROM, increase tissue extensibility and decrease trigger points to tolerate increased activity levels            With   [] TE   [] TA   [] neuro   [] other: Patient Education: [x] Review HEP    [] Progressed/Changed HEP based on:   [] positioning   [] body mechanics   [] transfers   [] heat/ice application    [] other:      Other Objective/Functional Measures:   - Decreased muscle tightness and improved mobility after treatment of STM/DTM Right glute  - Decreased TTP Right glute  - Improved gait but continues to guard right LE with ambulation    Pain Level (0-10 scale) post treatment: 4    ASSESSMENT/Changes in Function:      Patient will continue to benefit from skilled PT services to modify and progress therapeutic interventions, address functional mobility deficits, address ROM deficits, address strength deficits, analyze and address soft tissue restrictions and analyze and cue movement patterns to attain remaining goals. [x]  See Plan of Care  []  See progress note/recertification  []  See Discharge Summary         Progress towards goals / Updated goals:  Short Term Goals: To be accomplished in 5 treatments:  1.  Pt will be compliant and independent with HEP in order to facilitate PT sessions and aid with self management                        Eval Status:  Initiated                        Current Status:  2.  Pt to tolerate 30 min or more of TE and/or Interventions w/o increased s/s                        Eval Status:  Initiated                        Current Status:      Long Term Goals: To be accomplished in 10 treatments:  1.  Pt will report 50% improvement or better with involvement to show a significant increase in function                        Eval Status:  Initiated                        Current Status:  2.  Pt will have decreased pain right glute region at 3/10 or better to aid in returning to light community ambulation                          Eval Status:  Initiated                        Current Status:  3.  Pt will demonstrate Hip flx strength at 4/5 to increase hip flexion with walking for improved balance and a normal gait pattern relative to pregnant state                        Eval Status:  Initiated                        Current Status:  4.  Pt will ambulate 500' with little to no difficulty for progress to increased functional tolerance with community ambulation and getting around her home                          Eval Status:  Initiated                        Current Status:  5.  Pt will perform sit-stand x 10 reps w/o difficulty and with pain at 3/10 or better to allow her to stand from the seated position w/o fear of pain or losing balance                        Eval Status: Moderate difficulty performing sit to stand                        Current Status:  6.   Pt will improve FOTO score to 64 in 11 visits to show significant improvement in function for progress to little difficulty walking around her house to perform light house work                        Eval Status: 33                        Current Status:    PLAN  [x]  Upgrade activities as tolerated     [x]  Continue plan of care  []  Update interventions per flow sheet       []  Discharge due to:_  []  Other:_      Lorena Olvera, PT 6/4/2018  5:16 PM

## 2018-06-04 NOTE — PROGRESS NOTES
[facility] [address], [address] [place] [address], [address] [place], [place] [address]y [address] [phone] fax      Plan of Care/ Statement of Necessity for Physical Therapy Services    Patient name: Ronny Smith Start of Care: 2018   Referral source: Fabio Jackson MD : 1980    Medical Diagnosis: Sciatica, unspecified side [M54.30]   Onset Date:2.5 Weeks    Treatment Diagnosis: Piriformis syndrome Right, Neural tension Right LE   Prior Hospitalization: see medical history Provider#: [id number]   Medications: Verified on Patient summary List    Comorbidities: Pregnant, OA   Prior Level of Function: No back or right buttocks pain, Good balance     The Plan of Care and following information is based on the information from the initial evaluation. Assessment/ key information: Patient is a 41 y.o. female referred to PT with the above Dx. Patient seen today for c/o right hip/glute pain for the past 2.5 weeks causing pain with walking, lying on right side, and moving right leg laterally. Patient presents to PT with an impaired gait, decreased balance, decreased strength, decreased flexibility, and decreased mobility. A (+) Right piriformis test, (+) Right slump test, Neural tension right LE in the sciatic nerve pattern. Patient s/s appear to be consistent w/ diagnosis. Patient demonstrates the potential to make functional gains within a reasonable time frame. Patient will benefit from skilled PT to address impairments and improve functional mobility, strength, gait and balance for an improved quality of life.   Fall Risk Assessment: Patient demonstrates a low - medium Fall Risk   Evaluation Complexity History MEDIUM  Complexity : 1-2 comorbidities / personal factors will impact the outcome/ POC ; Examination MEDIUM Complexity : 3 Standardized tests and measures addressing body structure, function, activity limitation and / or participation in recreation  ;Presentation LOW Complexity : Stable, uncomplicated  ;Clinical Decision Making MEDIUM Complexity : FOTO score of 26-74  Overall Complexity Rating: LOW   Problem List: pain affecting function, decrease ROM, decrease strength, impaired gait/ balance, decrease ADL/ functional abilitiies, decrease activity tolerance, decrease flexibility/ joint mobility, decrease transfer abilities and other FOTO = 33   Treatment Plan may include any combination of the following: Therapeutic exercise, Therapeutic activities, Neuromuscular re-education, Physical agent/modality, Gait/balance training, Manual therapy, Patient education, Self Care training, Functional mobility training, Home safety training and Stair training  Patient / Family readiness to learn indicated by: asking questions, trying to perform skills and interest  Persons(s) to be included in education: patient (P)  Barriers to Learning/Limitations: yes;  physical  Patient Goal (s): Go back to work and not have the pain so that I can walk straight  Patient Self Reported Health Status: good  Rehabilitation Potential: good    Short Term Goals: To be accomplished in 5 treatments:  1. Pt will be compliant and independent with HEP in order to facilitate PT sessions and aid with self management                        Eval Status:  Initiated                        Current Status:  2. Pt to tolerate 30 min or more of TE and/or Interventions w/o increased s/s                        Eval Status:  Initiated                        Current Status:     Long Term Goals: To be accomplished in 10 treatments:  1. Pt will report 50% improvement or better with involvement to show a significant increase in function                        Eval Status:  Initiated                        Current Status:  2.   Pt will have decreased pain right glute region at 3/10 or better to aid in returning to light community ambulation                          Eval Status:  Initiated                        Current Status:  3. Pt will demonstrate Hip flx strength at 4/5 to increase hip flexion with walking for improved balance and a normal gait pattern relative to pregnant state                        Eval Status:  Initiated                        Current Status:  4. Pt will ambulate 500' with little to no difficulty for progress to increased functional tolerance with community ambulation and getting around her home                          Eval Status:  Initiated                        Current Status:  5. Pt will perform sit-stand x 10 reps w/o difficulty and with pain at 3/10 or better to allow her to stand from the seated position w/o fear of pain or losing balance                        Eval Status: Moderate difficulty performing sit to stand                        Current Status:  6. Pt will improve FOTO score to 64 in 11 visits to show significant improvement in function for progress to little difficulty walking around her house to perform light house work                        Eval Status: 33                        Current Status:    Frequency / Duration: Patient to be seen 2-3 times per week for 10 treatments. Patient/ Caregiver education and instruction: Diagnosis, prognosis, self care, activity modification and exercises   Comments:  Patient provided w/HEP   [x]  Plan of care has been reviewed with EV Hutton, PT 6/4/2018 5:16 PM  ________________________________________________________________________    I certify that the above Therapy Services are being furnished while the patient is under my care. I agree with the treatment plan and certify that this therapy is necessary.     [de-identified] Signature:____________________  Date:____________Time: _________    Please sign and return to [facility] [address] [place] [address], [address] [place] [address], [address] [place], [place] [address]y [address]  [phone] fax

## 2018-06-07 ENCOUNTER — ROUTINE PRENATAL (OUTPATIENT)
Dept: OBGYN CLINIC | Age: 38
End: 2018-06-07

## 2018-06-07 DIAGNOSIS — R35.0 FREQUENCY OF URINATION: ICD-10-CM

## 2018-06-07 DIAGNOSIS — Z34.83 PRENATAL CARE, SUBSEQUENT PREGNANCY IN THIRD TRIMESTER: Primary | ICD-10-CM

## 2018-06-07 DIAGNOSIS — O98.113 SYPHILIS AFFECTING PREGNANCY IN THIRD TRIMESTER: ICD-10-CM

## 2018-06-07 LAB
BILIRUB UR QL STRIP: NORMAL
GLUCOSE UR-MCNC: NEGATIVE MG/DL
KETONES P FAST UR STRIP-MCNC: NEGATIVE MG/DL
PH UR STRIP: 7 [PH] (ref 4.6–8)
PROT UR QL STRIP: NORMAL
SP GR UR STRIP: 1.02 (ref 1–1.03)
UA UROBILINOGEN AMB POC: NORMAL (ref 0.2–1)
URINALYSIS CLARITY POC: CLEAR
URINALYSIS COLOR POC: YELLOW
URINE BLOOD POC: NEGATIVE
URINE LEUKOCYTES POC: NORMAL
URINE NITRITES POC: NEGATIVE

## 2018-06-07 NOTE — PROGRESS NOTES
32w5d   Dated by 6 wk US  Limited prenatal care  For a routine prenatal visit.     No OB complaints today  Sciatica pain-->doing PT, which is helping,    Seen by MFM for AMA --> genetic screening normal  seen by MFM for recent syphilis s/p tx with Bacillin at the [facility] 3/28, 4/4, 4/10--> repeat titer wnl  ISeen in ED on 4/20 for dental abscess, started on amoxicillin, --->pt has an appointment with her dentist on 5/21/18,   Rh+  Tdap done  1hr GTT wnl  PTL precautions discussed  RTC in 2 weeks

## 2018-06-07 NOTE — PATIENT INSTRUCTIONS
Weeks 32 to 34 of Your Pregnancy: Care Instructions  Your Care Instructions    During the last few weeks of your pregnancy, you may have more aches and pains. It's important to rest when you can. Your growing baby is putting more pressure on your bladder. So you may need to urinate more often. Hemorrhoids are also common. These are painful, itchy veins in the rectal area. In the 36th week, most women have a test for group B streptococcus (GBS). GBS is a common bacteria that can live in the vagina and rectum. It can make your baby sick after birth. If you test positive, you will get antibiotics during labor. These will keep your baby from getting the bacteria. You may want to talk with your doctor about banking your baby's umbilical cord blood. This is the blood left in the cord after birth. If you want to save this blood, you must arrange it ahead of time. You can't decide at the last minute. If you haven't already had the Tdap shot during this pregnancy, talk to your doctor about getting it. It will help protect your  against pertussis infection. Follow-up care is a key part of your treatment and safety. Be sure to make and go to all appointments, and call your doctor if you are having problems. It's also a good idea to know your test results and keep a list of the medicines you take. How can you care for yourself at home? Ease hemorrhoids  · Get more liquids, fruits, vegetables, and fiber in your diet. This will help keep your stools soft. · Avoid sitting for too long. Lie on your left side several times a day. · Clean yourself with soft, moist toilet paper. Or you can use witch hazel pads or personal hygiene pads. · If you are uncomfortable, try ice packs. Or you can sit in a warm sitz bath. Do these for 20 minutes at a time, as needed. · Use hydrocortisone cream for pain and itching. Two examples are Anusol and Preparation H Hydrocortisone.   · Ask your doctor about taking an over-the-counter stool softener. Consider breastfeeding  · Experts recommend that women breastfeed for 1 year or longer. Breast milk is the perfect food for babies. · Breast milk is easier for babies to digest than formula. And it is always available, just the right temperature, and free. · In general, babies who are  are healthier than formula-fed babies. ¨  babies are less likely to get ear infections, colds, diarrhea, and pneumonia. ¨  babies who are fed only breast milk are less likely to get asthma and allergies. ¨  babies are less likely to be obese. ¨  babies are less likely to get diabetes or heart disease. · Women who breastfeed have less bleeding after the birth. Their uteruses also shrink back faster. · Some women who breastfeed lose weight faster. Making milk burns calories. · Breastfeeding can lower your risk of breast cancer, ovarian cancer, and osteoporosis. Decide about circumcision for boys  · As you make this decision, it may help to think about your personal, Christian, and family traditions. You get to decide if you will keep your son's penis natural or if he will be circumcised. · If you decide that you would like to have your baby circumcised, talk with your doctor. You can share your concerns about pain. And you can discuss your preferences for anesthesia. Where can you learn more? Go to http://inocencia-hank.info/. Enter J678 in the search box to learn more about \"Weeks 32 to 34 of Your Pregnancy: Care Instructions. \"  Current as of: March 16, 2017  Content Version: 11.4  © 8300-2061 [employer]. Care instructions adapted under license by [employer] (which disclaims liability or warranty for this information).  If you have questions about a medical condition or this instruction, always ask your healthcare professional. [place] [address] any warranty or liability for your use of this information. Learning About When to Call Your Doctor During Pregnancy (After 20 Weeks)  Your Care Instructions  It's common to have concerns about what might be a problem during pregnancy. Although most pregnant women don't have any serious problems, it's important to know when to call your doctor if you have certain symptoms or signs of labor. These are general suggestions. Your doctor may give you some more information about when to call. When to call your doctor (after 20 weeks)  Call 911 anytime you think you may need emergency care. For example, call if:  · You have severe vaginal bleeding. · You have sudden, severe pain in your belly. · You passed out (lost consciousness). · You have a seizure. · You see or feel the umbilical cord. · You think you are about to deliver your baby and can't make it safely to the hospital.  Call your doctor now or seek immediate medical care if:  · You have vaginal bleeding. · You have belly pain. · You have a fever. · You have symptoms of preeclampsia, such as:  ¨ Sudden swelling of your face, hands, or feet. ¨ New vision problems (such as dimness or blurring). ¨ A severe headache. · You have a sudden release of fluid from your vagina. (You think your water broke.)  · You think that you may be in labor. This means that you've had at least 4 contractions within 20 minutes or at least 8 contractions in an hour. · You notice that your baby has stopped moving or is moving much less than normal.  · You have symptoms of a urinary tract infection. These may include:  ¨ Pain or burning when you urinate. ¨ A frequent need to urinate without being able to pass much urine. ¨ Pain in the flank, which is just below the rib cage and above the waist on either side of the back. ¨ Blood in your urine. Watch closely for changes in your health, and be sure to contact your doctor if:  · You have vaginal discharge that smells bad.   · You have skin changes, such as:  ¨ A rash.  ¨ Itching. ¨ Yellow color to your skin. · You have other concerns about your pregnancy. If you have labor signs at 37 weeks or more  If you have signs of labor at 37 weeks or more, your doctor may tell you to call when your labor becomes more active. Symptoms of active labor include:  · Contractions that are regular. · Contractions that are less than 5 minutes apart. · Contractions that are hard to talk through. Follow-up care is a key part of your treatment and safety. Be sure to make and go to all appointments, and call your doctor if you are having problems. It's also a good idea to know your test results and keep a list of the medicines you take. Where can you learn more? Go to http://inocencia-hank.info/. Enter  in the search box to learn more about \"Learning About When to Call Your Doctor During Pregnancy (After 20 Weeks). \"  Current as of: March 16, 2017  Content Version: 11.4  © 6450-1521 [employer]. Care instructions adapted under license by [employer] (which disclaims liability or warranty for this information). If you have questions about a medical condition or this instruction, always ask your healthcare professional. [place] [address] any warranty or liability for your use of this information. Counting Your Baby's Kicks: Care Instructions  Your Care Instructions    Counting your baby's kicks is one way your doctor can tell that your baby is healthy. Most women-especially in a first pregnancy-feel their baby move for the first time between 16 and 22 weeks. The movement may feel like flutters rather than kicks. Your baby may move more at certain times of the day. When you are active, you may notice less kicking than when you are resting. At your prenatal visits, your doctor will ask whether the baby is active. In your last trimester, your doctor may ask you to count the number of times you feel your baby move.   Follow-up care is a key part of your treatment and safety. Be sure to make and go to all appointments, and call your doctor if you are having problems. It's also a good idea to know your test results and keep a list of the medicines you take. How do you count fetal kicks? · A common method of checking your baby's movement is to count the number of kicks or moves you feel in 1 hour. Ten movements (such as kicks, flutters, or rolls) in 1 hour are normal. Some doctors suggest that you count in the morning until you get to 10 movements. Then you can quit for that day and start again the next day. · Pick your baby's most active time of day to count. This may be any time from morning to evening. · If you do not feel 10 movements in an hour, your baby may be sleeping. Wait for the next hour and count again. When should you call for help? Call your doctor now or seek immediate medical care if:  ? · You noticed that your baby has stopped moving or is moving much less than normal.   ? Watch closely for changes in your health, and be sure to contact your doctor if you have any problems. Where can you learn more? Go to http://inocencia-hank.info/. Enter G666 in the search box to learn more about \"Counting Your Baby's Kicks: Care Instructions. \"  Current as of: March 16, 2017  Content Version: 11.4  © 4345-8228 [employer]. Care instructions adapted under license by [employer] (which disclaims liability or warranty for this information). If you have questions about a medical condition or this instruction, always ask your healthcare professional. [address] any warranty or liability for your use of this information. Sciatica: Exercises  Your Care Instructions  Here are some examples of typical rehabilitation exercises for your condition. Start each exercise slowly. Ease off the exercise if you start to have pain.   Your doctor or physical therapist will tell you when you can start these exercises and which ones will work best for you. When you are not being active, find a comfortable position for rest. Some people are comfortable on the floor or a medium-firm bed with a small pillow under their head and another under their knees. Some people prefer to lie on their side with a pillow between their knees. Don't stay in one position for too long. Take short walks (10 to 20 minutes) every 2 to 3 hours. Avoid slopes, hills, and stairs until you feel better. Walk only distances you can manage without pain, especially leg pain. How to do the exercises  Back stretches    1. Get down on your hands and knees on the floor. 2. Relax your head and allow it to droop. Round your back up toward the ceiling until you feel a nice stretch in your upper, middle, and lower back. Hold this stretch for as long as it feels comfortable, or about 15 to 30 seconds. 3. Return to the starting position with a flat back while you are on your hands and knees. 4. Let your back sway by pressing your stomach toward the floor. Lift your buttocks toward the ceiling. 5. Hold this position for 15 to 30 seconds. 6. Repeat 2 to 4 times. Follow-up care is a key part of your treatment and safety. Be sure to make and go to all appointments, and call your doctor if you are having problems. It's also a good idea to know your test results and keep a list of the medicines you take. Where can you learn more? Go to http://inocencia-hank.info/. Enter V618 in the search box to learn more about \"Sciatica: Exercises. \"  Current as of: March 21, 2017  Content Version: 11.4  © 9458-7410 [employer]. Care instructions adapted under license by [employer] (which disclaims liability or warranty for this information).  If you have questions about a medical condition or this instruction, always ask your healthcare professional. Maribel Pollard disclaims any warranty or liability for your use of this information.

## 2018-06-08 ENCOUNTER — HOSPITAL ENCOUNTER (OUTPATIENT)
Dept: PHYSICAL THERAPY | Age: 38
Discharge: HOME | End: 2018-06-08
Payer: MEDICAID

## 2018-06-08 PROCEDURE — 97110 THERAPEUTIC EXERCISES: CPT

## 2018-06-08 PROCEDURE — 97140 MANUAL THERAPY 1/> REGIONS: CPT

## 2018-06-08 NOTE — PROGRESS NOTES
PT DAILY TREATMENT NOTE 1216    Patient Name: Tony Martinez  Date:2018  : 1980  [x]  Patient  Verified  Payor: J Carlos Raw / [place] / Product Type: Managed Care Medicaid /    In time: 10:35 Out time:11:43  Total Treatment Time (min):36  Visit #: 2  of 10    Treatment Area: Sciatica, unspecified side [M54.30]    SUBJECTIVE  Pain Level (0-10 scale): 7  Any medication changes, allergies to medications, adverse drug reactions, diagnosis change, or new procedure performed?: [x] No    [] Yes (see summary sheet for update)  Subjective functional status/changes:   [] No changes reported  Heather  Been doing the  Exercise.     OBJECTIVE    Modality rationale: decrease edema, decrease inflammation, decrease pain and increase tissue extensibility to improve the patients ability to perform ADL    Min Type Additional Details    [] Estim:  []Unatt       []IFC  []Premod                        []Other:  []w/ice   []w/heat  Position:  Location:    [] Estim: []Att    []TENS instruct  []NMES                    []Other:  []w/US   []w/ice   []w/heat  Position:  Location:    []  Traction: [] Cervical       []Lumbar                       [] Prone          []Supine                       []Intermittent   []Continuous Lbs:  [] before manual  [] after manual    []  Ultrasound: []Continuous   [] Pulsed                           []1MHz   []3MHz W/cm2:  Location:    []  Iontophoresis with dexamethasone         Location: [] Take home patch   [] In clinic   10 [x]  Ice post    []  heat  []  Ice massage  []  Laser   []  Anodyne Position:(L) SL  Location:(R) glute  region    []  Laser with stim  []  Other:  Position:  Location:    []  Vasopneumatic Device Pressure:       [] lo [] med [] hi   Temperature: [] lo [] med [] hi   [x] Skin assessment post-treatment:  [x]intact []redness- no adverse reaction    []redness  adverse reaction:      min []Eval                  []Re-Eval       18 min Therapeutic Exercise:  [x] See flow sheet :   Rationale: increase ROM and increase strength to improve the patients ability to perform ADL     min Therapeutic Activity:  [x]  See flow sheet :   Rationale:   to improve the patients ability to       min Neuromuscular Re-education:  []  See flow sheet :   Rationale:   to improve the patients ability to     8 min Manual Therapy:  STM/DTM  (L) glute/piriformis   Rationale: decrease pain, increase ROM, increase tissue extensibility and decrease edema  to perform ADL     min Gait Training:  ___ feet with ___ device on level surfaces with ___ level of assist   Rationale: With   [x] TE   [] TA   [] neuro   [] other: Patient Education: [x] Review HEP    [] Progressed/Changed HEP based on:   [] positioning   [] body mechanics   [] transfers   [] heat/ice application    [] other:      Other Objective/Functional Measures:  Experienced  Pain at  (R) LE  During there ex. TTP  At  (R)  glute  region    Pain Level (0-10 scale) post treatment: 7    ASSESSMENT/Changes in Function: Held  Remaining  There ex  Due to increased  Pain. Discussed  With pt  On  Icing  More  Frequently. Patient will continue to benefit from skilled PT services to address functional mobility deficits, address ROM deficits, address strength deficits, analyze and address soft tissue restrictions and analyze and cue movement patterns to attain remaining goals.      [x]  See Plan of Care  []  See progress note/recertification  []  See Discharge Summary         Progress towards goals / Updated goals:  1.  Pt will be compliant and independent with HEP in order to facilitate PT sessions and aid with self management                        Eval Status:  Initiated                        IUYNEKX Status:  Met 6/8/18  2.  Pt to tolerate 30 min or more of TE and/or Interventions w/o increased s/s                        Eval Status:  Initiated                        Current Status:      Long Term Goals: To be accomplished in 10 treatments:  1.  Pt will report 50% improvement or better with involvement to show a significant increase in function                        Eval Status:  Initiated                        Current Status:  2.  Pt will have decreased pain right glute region at 3/10 or better to aid in returning to light community ambulation                          Eval Status:  Initiated                        Current Status:  3.  Pt will demonstrate Hip flx strength at 4/5 to increase hip flexion with walking for improved balance and a normal gait pattern relative to pregnant state                        Eval Status:  Initiated                        Current Status:  4.  Pt will ambulate 500' with little to no difficulty for progress to increased functional tolerance with community ambulation and getting around her home                          Eval Status:  Initiated                        Current Status:  5.  Pt will perform sit-stand x 10 reps w/o difficulty and with pain at 3/10 or better to allow her to stand from the seated position w/o fear of pain or losing balance                        Eval Status:  Moderate difficulty performing sit to stand                        Current Status:  6.  Pt will improve FOTO score to 64 in 11 visits to show significant improvement in function for progress to little difficulty walking around her house to perform light house work                        Eval Status: 33                        Current Status:       PLAN  []  Upgrade activities as tolerated     [x]  Continue plan of care  []  Update interventions per flow sheet       []  Discharge due to:_  []  Other:_      Cathi Phillips, PTA 6/8/2018  10:43 AM    Future Appointments  Date Time Provider Huong Walters   6/11/2018 10:30 AM Ros Avila PT [facility]   6/22/2018 9:30 AM MD Warren Arvizu

## 2018-06-11 ENCOUNTER — HOSPITAL ENCOUNTER (OUTPATIENT)
Dept: PHYSICAL THERAPY | Age: 38
Discharge: HOME | End: 2018-06-11
Payer: MEDICAID

## 2018-06-11 PROCEDURE — 97140 MANUAL THERAPY 1/> REGIONS: CPT

## 2018-06-11 PROCEDURE — 97110 THERAPEUTIC EXERCISES: CPT

## 2018-06-11 PROCEDURE — 97124 MASSAGE THERAPY: CPT

## 2018-06-11 NOTE — PROGRESS NOTES
PT DAILY TREATMENT NOTE 3-16    Patient Name: Hakeem Nascimento  Date:2018  : 1980  [x]  Patient  Verified  Payor: Prosper Rueda / [place] / Product Type: Managed Care Medicaid /    In time:10:34  Out time:11:21  Total Treatment Time (min): 52  Visit #: 3 of 10    Treatment Area: Sciatica, unspecified side [M54.30]    SUBJECTIVE  Pain Level (0-10 scale): 4  Any medication changes, allergies to medications, adverse drug reactions, diagnosis change, or new procedure performed?: [x] No    [] Yes (see summary sheet for update)  Subjective functional status/changes:   [] No changes reported  Reports feeling better while walking but still having pain/ difficulty with stair negotiation    OBJECTIVE    Modality rationale: decrease edema, decrease inflammation and decrease pain to improve the patients ability to perform daily functional activities   Min Type Additional Details    [] Estim:  []Unatt       []IFC  []Premod                        []Other:  []w/ice   []w/heat  Position:  Location:    [] Estim: []Att    []TENS instruct  []NMES                    []Other:  []w/US   []w/ice   []w/heat  Position:  Location:    []  Traction: [] Cervical       []Lumbar                       [] Prone          []Supine                       []Intermittent   []Continuous Lbs:  [] before manual  [] after manual    []  Ultrasound: []Continuous   [] Pulsed                           []1MHz   []3MHz W/cm2:  Location:    []  Iontophoresis with dexamethasone         Location: [] Take home patch   [] In clinic   10 [x]  Ice     []  heat  []  Ice massage  []  Laser   []  Anodyne Position: Long sitting  Location: Right piriformis    []  Laser with stim  []  Other:  Position:  Location:    []  Vasopneumatic Device Pressure:       [] lo [] med [] hi   Temperature: [] lo [] med [] hi   [] Skin assessment post-treatment:  []intact []redness- no adverse reaction    []redness  adverse reaction:         23 min Therapeutic Exercise:  [] See flow sheet :   Rationale: increase ROM, increase strength, improve coordination, improve balance and increase proprioception to improve the patients ability to return to household chores. 14 min Manual Therapy:  STM to right piriformis   Rationale: decrease pain, increase ROM and increase tissue extensibility to allow patient to perform HEP. With   [] TE   [] TA   [] neuro   [] other: Patient Education: [x] Review HEP    [] Progressed/Changed HEP based on:   [] positioning   [] body mechanics   [] transfers   [] heat/ice application    [] other:      Other Objective/Functional Measures: TTP right piriformis. Patient requires VCs for appropriate technique throughout therex. Pain Level (0-10 scale) post treatment: reports feeling better and looser     ASSESSMENT/Changes in Function: Patient continues to show improvements with signs/ symptoms however still demonstrates a decrease in strength, impaired ROM, deficits with balance and an increase in pain with functional activities. Patient will continue to benefit from skilled PT services to modify and progress therapeutic interventions, address functional mobility deficits, address ROM deficits, address strength deficits, analyze and address soft tissue restrictions and analyze and modify body mechanics/ergonomics to attain remaining goals.      [x]  See Plan of Care  []  See progress note/recertification  []  See Discharge Summary         Progress towards goals / Updated goals:  Progress towards goals / Updated goals:  1.  Pt will be compliant and independent with HEP in order to facilitate PT sessions and aid with self management                        Eval Status:  Initiated                        GZINJLH Status:  Met 6/8/18  2.  Pt to tolerate 30 min or more of TE and/or Interventions w/o increased s/s                        Eval Status:  Initiated                        Current Status: progressing 6/11/18      Long Term Goals: To be accomplished in 10 treatments:  1.  Pt will report 50% improvement or better with involvement to show a significant increase in function                        Eval Status:  Initiated                        Current Status:  2.  Pt will have decreased pain right glute region at 3/10 or better to aid in returning to light community ambulation                          Eval Status:  Initiated                        Current Status:  3.  Pt will demonstrate Hip flx strength at 4/5 to increase hip flexion with walking for improved balance and a normal gait pattern relative to pregnant state                        Eval Status:  Initiated                        Current Status:  4.  Pt will ambulate 500' with little to no difficulty for progress to increased functional tolerance with community ambulation and getting around her home                          Eval Status:  Initiated                        Current Status:  5.  Pt will perform sit-stand x 10 reps w/o difficulty and with pain at 3/10 or better to allow her to stand from the seated position w/o fear of pain or losing balance                        Eval Status:  Moderate difficulty performing sit to stand                        Current Status:  6.  Pt will improve FOTO score to 64 in 11 visits to show significant improvement in function for progress to little difficulty walking around her house to perform light house work                        Eval Status: 33                        Current Status:    PLAN  []  Upgrade activities as tolerated     [x]  Continue plan of care  []  Update interventions per flow sheet       []  Discharge due to:_  []  Other:_      Lilia Richardson PT 6/11/2018  11:17 AM    Future Appointments  Date Time Provider Huong Walters   6/22/2018 9:30 AM MD Warren Fraser

## 2018-06-22 ENCOUNTER — ROUTINE PRENATAL (OUTPATIENT)
Dept: OBGYN CLINIC | Age: 38
End: 2018-06-22

## 2018-06-22 VITALS
DIASTOLIC BLOOD PRESSURE: 65 MMHG | HEART RATE: 86 BPM | SYSTOLIC BLOOD PRESSURE: 95 MMHG | TEMPERATURE: 98.2 F | BODY MASS INDEX: 28.13 KG/M2 | WEIGHT: 185.6 LBS | HEIGHT: 68 IN | RESPIRATION RATE: 18 BRPM | OXYGEN SATURATION: 99 %

## 2018-06-22 DIAGNOSIS — Z34.83 PRENATAL CARE, SUBSEQUENT PREGNANCY IN THIRD TRIMESTER: Primary | ICD-10-CM

## 2018-06-22 RX ORDER — DIPHENHYDRAMINE HCL 25 MG
25 CAPSULE ORAL
COMMUNITY
End: 2019-02-19

## 2018-06-22 NOTE — PATIENT INSTRUCTIONS
Weeks 34 to 36 of Your Pregnancy: Care Instructions  Your Care Instructions    By now, your baby and your belly have grown quite large. It is almost time to give birth. A full-term pregnancy can deliver between 37 and 42 weeks. Your baby's lungs are almost ready to breathe air. The bones in your baby's head are now firm enough to protect it, but soft enough to move down through the birth canal.  You may feel excited, happy, anxious, or scared. You may wonder how you will know if you are in labor or what to expect during labor. Try to be flexible in your expectations of the birth. Because each birth is different, there is no way to know exactly what childbirth will be like for you. This care sheet will help you know what to expect and how to prepare. This may make your childbirth easier. If you haven't already had the Tdap shot during this pregnancy, talk to your doctor about getting it. It will help protect your  against pertussis infection. In the 36th week, most women have a test for group B streptococcus (GBS). GBS is a common bacteria that can live in the vagina and rectum. It can make your baby sick after birth. If you test positive, you will get antibiotics during labor. The medicine will keep your baby from getting the bacteria. Follow-up care is a key part of your treatment and safety. Be sure to make and go to all appointments, and call your doctor if you are having problems. It's also a good idea to know your test results and keep a list of the medicines you take. How can you care for yourself at home? Learn about pain relief choices  · Pain is different for every woman. Talk with your doctor about your feelings about pain. · You can choose from several types of pain relief. These include medicine or breathing techniques, as well as comfort measures. You can use more than one option. · If you choose to have pain medicine during labor, talk to your doctor about your options.  Some medicines lower anxiety and help with some of the pain. Others make your lower body numb so that you won't feel pain. · Be sure to tell your doctor about your pain medicine choice before you start labor or very early in your labor. You may be able to change your mind as labor progresses. · Rarely, a woman is put to sleep by medicine given through a mask or an IV. Labor and delivery  · The first stage of labor has three parts: early, active, and transition. ¨ Most women have early labor at home. You can stay busy or rest, eat light snacks, drink clear fluids, and start counting contractions. ¨ When talking during a contraction gets hard, you may be moving to active labor. During active labor, you should head for the hospital if you are not there already. ¨ You are in active labor when contractions come every 3 to 4 minutes and last about 60 seconds. Your cervix is opening more rapidly. ¨ If your water breaks, contractions will come faster and stronger. ¨ During transition, your cervix is stretching, and contractions are coming more rapidly. ¨ You may want to push, but your cervix might not be ready. Your doctor will tell you when to push. · The second stage starts when your cervix is completely opened and you are ready to push. ¨ Contractions are very strong to push the baby down the birth canal.  ¨ You will feel the urge to push. You may feel like you need to have a bowel movement. ¨ You may be coached to push with contractions. These contractions will be very strong, but you will not have them as often. You can get a little rest between contractions. ¨ You may be emotional and irritable. You may not be aware of what is going on around you. ¨ One last push, and your baby is born. · The third stage is when a few more contractions push out the placenta. This may take 30 minutes or less. · The fourth stage is the welcome recovery. You may feel overwhelmed with emotions and exhausted but alert.  This is a good time to start breastfeeding. Where can you learn more? Go to http://inocencia-hank.info/. Enter P018 in the search box to learn more about \"Weeks 34 to 36 of Your Pregnancy: Care Instructions. \"  Current as of: March 16, 2017  Content Version: 11.4  © 6232-1365 [employer]. Care instructions adapted under license by [employer] (which disclaims liability or warranty for this information). If you have questions about a medical condition or this instruction, always ask your healthcare professional. [address] any warranty or liability for your use of this information. Learning About When to Call Your Doctor During Pregnancy (After 20 Weeks)  Your Care Instructions  It's common to have concerns about what might be a problem during pregnancy. Although most pregnant women don't have any serious problems, it's important to know when to call your doctor if you have certain symptoms or signs of labor. These are general suggestions. Your doctor may give you some more information about when to call. When to call your doctor (after 20 weeks)  Call 911 anytime you think you may need emergency care. For example, call if:  · You have severe vaginal bleeding. · You have sudden, severe pain in your belly. · You passed out (lost consciousness). · You have a seizure. · You see or feel the umbilical cord. · You think you are about to deliver your baby and can't make it safely to the hospital.  Call your doctor now or seek immediate medical care if:  · You have vaginal bleeding. · You have belly pain. · You have a fever. · You have symptoms of preeclampsia, such as:  ¨ Sudden swelling of your face, hands, or feet. ¨ New vision problems (such as dimness or blurring). ¨ A severe headache. · You have a sudden release of fluid from your vagina. (You think your water broke.)  · You think that you may be in labor.  This means that you've had at least 4 contractions within 20 minutes or at least 8 contractions in an hour. · You notice that your baby has stopped moving or is moving much less than normal.  · You have symptoms of a urinary tract infection. These may include:  ¨ Pain or burning when you urinate. ¨ A frequent need to urinate without being able to pass much urine. ¨ Pain in the flank, which is just below the rib cage and above the waist on either side of the back. ¨ Blood in your urine. Watch closely for changes in your health, and be sure to contact your doctor if:  · You have vaginal discharge that smells bad. · You have skin changes, such as:  ¨ A rash. ¨ Itching. ¨ Yellow color to your skin. · You have other concerns about your pregnancy. If you have labor signs at 37 weeks or more  If you have signs of labor at 37 weeks or more, your doctor may tell you to call when your labor becomes more active. Symptoms of active labor include:  · Contractions that are regular. · Contractions that are less than 5 minutes apart. · Contractions that are hard to talk through. Follow-up care is a key part of your treatment and safety. Be sure to make and go to all appointments, and call your doctor if you are having problems. It's also a good idea to know your test results and keep a list of the medicines you take. Where can you learn more? Go to http://inocencia-hank.info/. Enter  in the search box to learn more about \"Learning About When to Call Your Doctor During Pregnancy (After 20 Weeks). \"  Current as of: March 16, 2017  Content Version: 11.4  © 4245-5086 [employer]. Care instructions adapted under license by [employer] (which disclaims liability or warranty for this information). If you have questions about a medical condition or this instruction, always ask your healthcare professional. [place] [address] any warranty or liability for your use of this information.        Counting Your Baby's Kicks: Care Instructions  Your Care Instructions    Counting your baby's kicks is one way your doctor can tell that your baby is healthy. Most women-especially in a first pregnancy-feel their baby move for the first time between 16 and 22 weeks. The movement may feel like flutters rather than kicks. Your baby may move more at certain times of the day. When you are active, you may notice less kicking than when you are resting. At your prenatal visits, your doctor will ask whether the baby is active. In your last trimester, your doctor may ask you to count the number of times you feel your baby move. Follow-up care is a key part of your treatment and safety. Be sure to make and go to all appointments, and call your doctor if you are having problems. It's also a good idea to know your test results and keep a list of the medicines you take. How do you count fetal kicks? · A common method of checking your baby's movement is to count the number of kicks or moves you feel in 1 hour. Ten movements (such as kicks, flutters, or rolls) in 1 hour are normal. Some doctors suggest that you count in the morning until you get to 10 movements. Then you can quit for that day and start again the next day. · Pick your baby's most active time of day to count. This may be any time from morning to evening. · If you do not feel 10 movements in an hour, your baby may be sleeping. Wait for the next hour and count again. When should you call for help? Call your doctor now or seek immediate medical care if:  ? · You noticed that your baby has stopped moving or is moving much less than normal.   ? Watch closely for changes in your health, and be sure to contact your doctor if you have any problems. Where can you learn more? Go to http://inocencia-hank.info/. Enter U743 in the search box to learn more about \"Counting Your Baby's Kicks: Care Instructions. \"  Current as of: March 16, 2017  Content Version: 11.4  © 4039-0217 [employer]. Care instructions adapted under license by [employer] (which disclaims liability or warranty for this information). If you have questions about a medical condition or this instruction, always ask your healthcare professional. [address] any warranty or liability for your use of this information.

## 2018-06-22 NOTE — PROGRESS NOTES
34w6d   Dated by 6 wk US  Limited prenatal care  For a routine prenatal visit. No OB complaints today  Sciatica pain-->doing PT, which is helping,    Seen by MFM for AMA --> genetic screening normal  seen by MFM for recent syphilis s/p tx with Bacillin at the [facility] 3/28, 4/4, 4/10--> repeat titer wnl  ISeen in ED on 4/20 for dental abscess, started on amoxicillin, --->s/p dental appoint 5/21, abscess resolved  Rh+  Tdap done  1hr GTT wnl  PTL precautions and kick counts discussed. Answered all of her questions.    GBS next visit  RTC in 2 weeks

## 2018-07-05 ENCOUNTER — ROUTINE PRENATAL (OUTPATIENT)
Dept: OBGYN CLINIC | Age: 38
End: 2018-07-05

## 2018-07-05 VITALS
OXYGEN SATURATION: 100 % | RESPIRATION RATE: 18 BRPM | HEART RATE: 80 BPM | DIASTOLIC BLOOD PRESSURE: 69 MMHG | SYSTOLIC BLOOD PRESSURE: 112 MMHG | BODY MASS INDEX: 27.83 KG/M2 | TEMPERATURE: 99.8 F | WEIGHT: 183.6 LBS | HEIGHT: 68 IN

## 2018-07-05 DIAGNOSIS — Z34.83 PRENATAL CARE, SUBSEQUENT PREGNANCY IN THIRD TRIMESTER: Primary | ICD-10-CM

## 2018-07-05 LAB
CHLAMYDIA, EXTERNAL: NEGATIVE
N. GONORRHEA, EXTERNAL: NEGATIVE

## 2018-07-05 NOTE — PROGRESS NOTES
36w5d   Dated by 6 wk   Limited prenatal care  AMA s/p MFM  S/p tx for syphillis this treatment repeat titers wnl  Sciatica: improving, still walks with a limp  C/o diarrhea x 1wk and nausea x 2days. States every time she eats she has diarrhea. Was at a cook out last week, ate some coleslaw.--->possible viral GI, discussed keeping herself hydrated with water and gatorade, eating bland foods. If symptoms worsen or has f/c, to go immediately to L&D to be evaluated-->pt expressed understanding  See flow sheet  Tdap done  1hr GTT wnl  GBS/GC/CT/TV today  PTL precautions and kick counts discussed. Answered all of her questions.    RTC in 1 weeks

## 2018-07-05 NOTE — PATIENT INSTRUCTIONS
Weeks 34 to 36 of Your Pregnancy: Care Instructions  Your Care Instructions    By now, your baby and your belly have grown quite large. It is almost time to give birth. A full-term pregnancy can deliver between 37 and 42 weeks. Your baby's lungs are almost ready to breathe air. The bones in your baby's head are now firm enough to protect it, but soft enough to move down through the birth canal.  You may feel excited, happy, anxious, or scared. You may wonder how you will know if you are in labor or what to expect during labor. Try to be flexible in your expectations of the birth. Because each birth is different, there is no way to know exactly what childbirth will be like for you. This care sheet will help you know what to expect and how to prepare. This may make your childbirth easier. If you haven't already had the Tdap shot during this pregnancy, talk to your doctor about getting it. It will help protect your  against pertussis infection. In the 36th week, most women have a test for group B streptococcus (GBS). GBS is a common bacteria that can live in the vagina and rectum. It can make your baby sick after birth. If you test positive, you will get antibiotics during labor. The medicine will keep your baby from getting the bacteria. Follow-up care is a key part of your treatment and safety. Be sure to make and go to all appointments, and call your doctor if you are having problems. It's also a good idea to know your test results and keep a list of the medicines you take. How can you care for yourself at home? Learn about pain relief choices  · Pain is different for every woman. Talk with your doctor about your feelings about pain. · You can choose from several types of pain relief. These include medicine or breathing techniques, as well as comfort measures. You can use more than one option. · If you choose to have pain medicine during labor, talk to your doctor about your options.  Some medicines lower anxiety and help with some of the pain. Others make your lower body numb so that you won't feel pain. · Be sure to tell your doctor about your pain medicine choice before you start labor or very early in your labor. You may be able to change your mind as labor progresses. · Rarely, a woman is put to sleep by medicine given through a mask or an IV. Labor and delivery  · The first stage of labor has three parts: early, active, and transition. ¨ Most women have early labor at home. You can stay busy or rest, eat light snacks, drink clear fluids, and start counting contractions. ¨ When talking during a contraction gets hard, you may be moving to active labor. During active labor, you should head for the hospital if you are not there already. ¨ You are in active labor when contractions come every 3 to 4 minutes and last about 60 seconds. Your cervix is opening more rapidly. ¨ If your water breaks, contractions will come faster and stronger. ¨ During transition, your cervix is stretching, and contractions are coming more rapidly. ¨ You may want to push, but your cervix might not be ready. Your doctor will tell you when to push. · The second stage starts when your cervix is completely opened and you are ready to push. ¨ Contractions are very strong to push the baby down the birth canal.  ¨ You will feel the urge to push. You may feel like you need to have a bowel movement. ¨ You may be coached to push with contractions. These contractions will be very strong, but you will not have them as often. You can get a little rest between contractions. ¨ You may be emotional and irritable. You may not be aware of what is going on around you. ¨ One last push, and your baby is born. · The third stage is when a few more contractions push out the placenta. This may take 30 minutes or less. · The fourth stage is the welcome recovery. You may feel overwhelmed with emotions and exhausted but alert.  This is a good time to start breastfeeding. Where can you learn more? Go to http://inocencia-hank.info/. Enter S368 in the search box to learn more about \"Weeks 34 to 36 of Your Pregnancy: Care Instructions. \"  Current as of: March 16, 2017  Content Version: 11.4  © 5597-3483 [employer]. Care instructions adapted under license by [employer] (which disclaims liability or warranty for this information). If you have questions about a medical condition or this instruction, always ask your healthcare professional. [address] any warranty or liability for your use of this information. Learning About When to Call Your Doctor During Pregnancy (After 20 Weeks)  Your Care Instructions  It's common to have concerns about what might be a problem during pregnancy. Although most pregnant women don't have any serious problems, it's important to know when to call your doctor if you have certain symptoms or signs of labor. These are general suggestions. Your doctor may give you some more information about when to call. When to call your doctor (after 20 weeks)  Call 911 anytime you think you may need emergency care. For example, call if:  · You have severe vaginal bleeding. · You have sudden, severe pain in your belly. · You passed out (lost consciousness). · You have a seizure. · You see or feel the umbilical cord. · You think you are about to deliver your baby and can't make it safely to the hospital.  Call your doctor now or seek immediate medical care if:  · You have vaginal bleeding. · You have belly pain. · You have a fever. · You have symptoms of preeclampsia, such as:  ¨ Sudden swelling of your face, hands, or feet. ¨ New vision problems (such as dimness or blurring). ¨ A severe headache. · You have a sudden release of fluid from your vagina. (You think your water broke.)  · You think that you may be in labor.  This means that you've had at least 4 contractions within 20 minutes or at least 8 contractions in an hour. · You notice that your baby has stopped moving or is moving much less than normal.  · You have symptoms of a urinary tract infection. These may include:  ¨ Pain or burning when you urinate. ¨ A frequent need to urinate without being able to pass much urine. ¨ Pain in the flank, which is just below the rib cage and above the waist on either side of the back. ¨ Blood in your urine. Watch closely for changes in your health, and be sure to contact your doctor if:  · You have vaginal discharge that smells bad. · You have skin changes, such as:  ¨ A rash. ¨ Itching. ¨ Yellow color to your skin. · You have other concerns about your pregnancy. If you have labor signs at 37 weeks or more  If you have signs of labor at 37 weeks or more, your doctor may tell you to call when your labor becomes more active. Symptoms of active labor include:  · Contractions that are regular. · Contractions that are less than 5 minutes apart. · Contractions that are hard to talk through. Follow-up care is a key part of your treatment and safety. Be sure to make and go to all appointments, and call your doctor if you are having problems. It's also a good idea to know your test results and keep a list of the medicines you take. Where can you learn more? Go to http://inocencia-hank.info/. Enter  in the search box to learn more about \"Learning About When to Call Your Doctor During Pregnancy (After 20 Weeks). \"  Current as of: March 16, 2017  Content Version: 11.4  © 5766-4348 [employer]. Care instructions adapted under license by [employer] (which disclaims liability or warranty for this information). If you have questions about a medical condition or this instruction, always ask your healthcare professional. [place] [address] any warranty or liability for your use of this information.        Counting Your Baby's Kicks: Care Instructions  Your Care Instructions    Counting your baby's kicks is one way your doctor can tell that your baby is healthy. Most women-especially in a first pregnancy-feel their baby move for the first time between 16 and 22 weeks. The movement may feel like flutters rather than kicks. Your baby may move more at certain times of the day. When you are active, you may notice less kicking than when you are resting. At your prenatal visits, your doctor will ask whether the baby is active. In your last trimester, your doctor may ask you to count the number of times you feel your baby move. Follow-up care is a key part of your treatment and safety. Be sure to make and go to all appointments, and call your doctor if you are having problems. It's also a good idea to know your test results and keep a list of the medicines you take. How do you count fetal kicks? · A common method of checking your baby's movement is to count the number of kicks or moves you feel in 1 hour. Ten movements (such as kicks, flutters, or rolls) in 1 hour are normal. Some doctors suggest that you count in the morning until you get to 10 movements. Then you can quit for that day and start again the next day. · Pick your baby's most active time of day to count. This may be any time from morning to evening. · If you do not feel 10 movements in an hour, your baby may be sleeping. Wait for the next hour and count again. When should you call for help? Call your doctor now or seek immediate medical care if:  ? · You noticed that your baby has stopped moving or is moving much less than normal.   ? Watch closely for changes in your health, and be sure to contact your doctor if you have any problems. Where can you learn more? Go to http://inocencia-hank.info/. Enter U015 in the search box to learn more about \"Counting Your Baby's Kicks: Care Instructions. \"  Current as of: March 16, 2017  Content Version: 11.4  © 5873-7708 [employer]. Care instructions adapted under license by [employer] (which disclaims liability or warranty for this information). If you have questions about a medical condition or this instruction, always ask your healthcare professional. [address] any warranty or liability for your use of this information.

## 2018-07-09 LAB
BACTERIAL VAGINOSIS, NAA: POSITIVE
CANDIDA ALBICANS, NAA, 180056: NEGATIVE
CANDIDA GLABRATA, NAA, 180057: NEGATIVE
CANDIDA KRUSEI, NAA, 180016: NEGATIVE
CHLAMYDIA TRACHOMATIS, NAA, 180097: NEGATIVE
HERPES 1 (THINPREP / APTIMA SWAB): NEGATIVE
HERPES 2 (THINPREP / APTIMA SWAB): NEGATIVE
NEISSERIA GONORRHOEAE, NAA, 180104: NEGATIVE
RESULT: NORMAL
TRICH VAG BY NAA, 180087: NEGATIVE

## 2018-07-10 ENCOUNTER — TELEPHONE (OUTPATIENT)
Dept: OBGYN CLINIC | Age: 38
End: 2018-07-10

## 2018-07-10 DIAGNOSIS — N76.0 BV (BACTERIAL VAGINOSIS): Primary | ICD-10-CM

## 2018-07-10 DIAGNOSIS — B96.89 BV (BACTERIAL VAGINOSIS): Primary | ICD-10-CM

## 2018-07-10 RX ORDER — METRONIDAZOLE 7.5 MG/G
1 GEL VAGINAL
Qty: 187.5 MG | Refills: 0 | Status: SHIPPED | OUTPATIENT
Start: 2018-07-10 | End: 2018-07-16

## 2018-07-10 NOTE — TELEPHONE ENCOUNTER
Call made tot he pt using two identifiers, name and . Pt given lab results of swab came back positive for BV and that medication was sent to her pharmacy. Pt verbalized understanding.

## 2018-07-10 NOTE — TELEPHONE ENCOUNTER
----- Message from Yuri Morales MD sent at 7/10/2018  2:58 PM EDT -----  Please let patient know that her vaginal swab came back +BV. Rx sent to her pharmacy.

## 2018-07-13 ENCOUNTER — ANESTHESIA (OUTPATIENT)
Dept: LABOR AND DELIVERY | Age: 38
DRG: 560 | End: 2018-07-13
Payer: MEDICAID

## 2018-07-13 ENCOUNTER — ANESTHESIA EVENT (OUTPATIENT)
Dept: LABOR AND DELIVERY | Age: 38
DRG: 560 | End: 2018-07-13
Payer: MEDICAID

## 2018-07-13 ENCOUNTER — HOSPITAL ENCOUNTER (INPATIENT)
Age: 38
LOS: 3 days | Discharge: HOME | DRG: 560 | End: 2018-07-16
Attending: OBSTETRICS & GYNECOLOGY | Admitting: OBSTETRICS & GYNECOLOGY
Payer: MEDICAID

## 2018-07-13 ENCOUNTER — ROUTINE PRENATAL (OUTPATIENT)
Dept: OBGYN CLINIC | Age: 38
End: 2018-07-13

## 2018-07-13 VITALS
HEART RATE: 88 BPM | DIASTOLIC BLOOD PRESSURE: 84 MMHG | BODY MASS INDEX: 27.74 KG/M2 | OXYGEN SATURATION: 100 % | WEIGHT: 183 LBS | RESPIRATION RATE: 18 BRPM | HEIGHT: 68 IN | SYSTOLIC BLOOD PRESSURE: 122 MMHG | TEMPERATURE: 97.2 F

## 2018-07-13 DIAGNOSIS — Z34.83 ENCOUNTER FOR SUPERVISION OF OTHER NORMAL PREGNANCY IN THIRD TRIMESTER: Primary | ICD-10-CM

## 2018-07-13 PROBLEM — Z34.90 PREGNANCY: Status: ACTIVE | Noted: 2018-07-13

## 2018-07-13 LAB
ABO + RH BLD: NORMAL
APPEARANCE UR: CLEAR
BASOPHILS # BLD: 0 K/UL (ref 0–0.1)
BASOPHILS NFR BLD: 0 % (ref 0–2)
BILIRUB UR QL: ABNORMAL
BLOOD GROUP ANTIBODIES SERPL: NORMAL
COLOR UR: ABNORMAL
DIFFERENTIAL METHOD BLD: ABNORMAL
EOSINOPHIL # BLD: 0.3 K/UL (ref 0–0.4)
EOSINOPHIL NFR BLD: 3 % (ref 0–5)
ERYTHROCYTE [DISTWIDTH] IN BLOOD BY AUTOMATED COUNT: 13.3 % (ref 11.6–14.5)
GLUCOSE UR QL STRIP.AUTO: NEGATIVE MG/DL
HCT VFR BLD AUTO: 28.5 % (ref 35–45)
HGB BLD-MCNC: 9.9 G/DL (ref 12–16)
KETONES UR-MCNC: NEGATIVE MG/DL
LEUKOCYTE ESTERASE UR QL STRIP: NEGATIVE
LYMPHOCYTES # BLD: 2.6 K/UL (ref 0.9–3.6)
LYMPHOCYTES NFR BLD: 27 % (ref 21–52)
MCH RBC QN AUTO: 28.6 PG (ref 24–34)
MCHC RBC AUTO-ENTMCNC: 34.7 G/DL (ref 31–37)
MCV RBC AUTO: 82.4 FL (ref 74–97)
MONOCYTES # BLD: 0.7 K/UL (ref 0.05–1.2)
MONOCYTES NFR BLD: 7 % (ref 3–10)
NEUTS SEG # BLD: 6.1 K/UL (ref 1.8–8)
NEUTS SEG NFR BLD: 63 % (ref 40–73)
NITRITE UR QL: NEGATIVE
PH UR: 7 [PH] (ref 5–9)
PLATELET # BLD AUTO: 291 K/UL (ref 135–420)
PMV BLD AUTO: 10.2 FL (ref 9.2–11.8)
PROT UR QL: 30 MG/DL
RBC # BLD AUTO: 3.46 M/UL (ref 4.2–5.3)
RBC # UR STRIP: NEGATIVE /UL
SERVICE CMNT-IMP: ABNORMAL
SP GR UR: 1.02 (ref 1–1.02)
SPECIMEN EXP DATE BLD: NORMAL
UROBILINOGEN UR QL: 4 EU/DL (ref 0.2–1)
WBC # BLD AUTO: 9.6 K/UL (ref 4.6–13.2)

## 2018-07-13 PROCEDURE — 74011250636 HC RX REV CODE- 250/636

## 2018-07-13 PROCEDURE — 77030014125 HC TY EPDRL BBMI -B

## 2018-07-13 PROCEDURE — 65270000029 HC RM PRIVATE

## 2018-07-13 PROCEDURE — 74011000258 HC RX REV CODE- 258: Performed by: OBSTETRICS & GYNECOLOGY

## 2018-07-13 PROCEDURE — 76060000044 HC ANESTHESIA 6.5 TO 7 HR

## 2018-07-13 PROCEDURE — 74011000250 HC RX REV CODE- 250

## 2018-07-13 PROCEDURE — 81003 URINALYSIS AUTO W/O SCOPE: CPT

## 2018-07-13 PROCEDURE — 74011250636 HC RX REV CODE- 250/636: Performed by: OBSTETRICS & GYNECOLOGY

## 2018-07-13 PROCEDURE — 10907ZC DRAINAGE OF AMNIOTIC FLUID, THERAPEUTIC FROM PRODUCTS OF CONCEPTION, VIA NATURAL OR ARTIFICIAL OPENING: ICD-10-PCS | Performed by: OBSTETRICS & GYNECOLOGY

## 2018-07-13 PROCEDURE — 85025 COMPLETE CBC W/AUTO DIFF WBC: CPT | Performed by: OBSTETRICS & GYNECOLOGY

## 2018-07-13 PROCEDURE — 86900 BLOOD TYPING SEROLOGIC ABO: CPT | Performed by: OBSTETRICS & GYNECOLOGY

## 2018-07-13 PROCEDURE — 74011250636 HC RX REV CODE- 250/636: Performed by: ANESTHESIOLOGY

## 2018-07-13 RX ORDER — OXYTOCIN/RINGER'S LACTATE 20/1000 ML
125 PLASTIC BAG, INJECTION (ML) INTRAVENOUS CONTINUOUS
Status: DISCONTINUED | OUTPATIENT
Start: 2018-07-13 | End: 2018-07-14

## 2018-07-13 RX ORDER — OXYTOCIN/RINGER'S LACTATE 20/1000 ML
500 PLASTIC BAG, INJECTION (ML) INTRAVENOUS ONCE
Status: COMPLETED | OUTPATIENT
Start: 2018-07-13 | End: 2018-07-14

## 2018-07-13 RX ORDER — HYDROMORPHONE HYDROCHLORIDE 1 MG/ML
1 INJECTION, SOLUTION INTRAMUSCULAR; INTRAVENOUS; SUBCUTANEOUS
Status: DISCONTINUED | OUTPATIENT
Start: 2018-07-13 | End: 2018-07-14

## 2018-07-13 RX ORDER — SODIUM CHLORIDE 0.9 % (FLUSH) 0.9 %
5-10 SYRINGE (ML) INJECTION AS NEEDED
Status: DISCONTINUED | OUTPATIENT
Start: 2018-07-13 | End: 2018-07-14

## 2018-07-13 RX ORDER — TERBUTALINE SULFATE 1 MG/ML
0.25 INJECTION SUBCUTANEOUS
Status: DISCONTINUED | OUTPATIENT
Start: 2018-07-13 | End: 2018-07-14

## 2018-07-13 RX ORDER — DIPHENHYDRAMINE HYDROCHLORIDE 50 MG/ML
25 INJECTION, SOLUTION INTRAMUSCULAR; INTRAVENOUS
Status: DISCONTINUED | OUTPATIENT
Start: 2018-07-13 | End: 2018-07-14

## 2018-07-13 RX ORDER — LIDOCAINE HYDROCHLORIDE AND EPINEPHRINE 15; 5 MG/ML; UG/ML
INJECTION, SOLUTION EPIDURAL AS NEEDED
Status: DISCONTINUED | OUTPATIENT
Start: 2018-07-13 | End: 2018-07-14 | Stop reason: HOSPADM

## 2018-07-13 RX ORDER — NALOXONE HYDROCHLORIDE 0.4 MG/ML
0.2 INJECTION, SOLUTION INTRAMUSCULAR; INTRAVENOUS; SUBCUTANEOUS AS NEEDED
Status: DISCONTINUED | OUTPATIENT
Start: 2018-07-13 | End: 2018-07-14

## 2018-07-13 RX ORDER — MINERAL OIL
30 OIL (ML) ORAL AS NEEDED
Status: DISCONTINUED | OUTPATIENT
Start: 2018-07-13 | End: 2018-07-14

## 2018-07-13 RX ORDER — ROPIVACAINE HYDROCHLORIDE 2 MG/ML
10 INJECTION, SOLUTION EPIDURAL; INFILTRATION; PERINEURAL
Status: COMPLETED | OUTPATIENT
Start: 2018-07-13 | End: 2018-07-13

## 2018-07-13 RX ORDER — FENTANYL CITRATE 50 UG/ML
INJECTION, SOLUTION INTRAMUSCULAR; INTRAVENOUS
Status: COMPLETED
Start: 2018-07-13 | End: 2018-07-13

## 2018-07-13 RX ORDER — LIDOCAINE HYDROCHLORIDE 10 MG/ML
20 INJECTION, SOLUTION EPIDURAL; INFILTRATION; INTRACAUDAL; PERINEURAL AS NEEDED
Status: DISCONTINUED | OUTPATIENT
Start: 2018-07-13 | End: 2018-07-14

## 2018-07-13 RX ORDER — FENTANYL CITRATE 50 UG/ML
100 INJECTION, SOLUTION INTRAMUSCULAR; INTRAVENOUS ONCE
Status: COMPLETED | OUTPATIENT
Start: 2018-07-13 | End: 2018-07-13

## 2018-07-13 RX ORDER — BUTORPHANOL TARTRATE 1 MG/ML
2 INJECTION INTRAMUSCULAR; INTRAVENOUS
Status: DISCONTINUED | OUTPATIENT
Start: 2018-07-13 | End: 2018-07-14

## 2018-07-13 RX ORDER — SODIUM CHLORIDE, SODIUM LACTATE, POTASSIUM CHLORIDE, CALCIUM CHLORIDE 600; 310; 30; 20 MG/100ML; MG/100ML; MG/100ML; MG/100ML
125 INJECTION, SOLUTION INTRAVENOUS CONTINUOUS
Status: DISCONTINUED | OUTPATIENT
Start: 2018-07-13 | End: 2018-07-14

## 2018-07-13 RX ORDER — SODIUM CHLORIDE 0.9 % (FLUSH) 0.9 %
5-10 SYRINGE (ML) INJECTION EVERY 8 HOURS
Status: DISCONTINUED | OUTPATIENT
Start: 2018-07-13 | End: 2018-07-14

## 2018-07-13 RX ORDER — FENTANYL CITRATE 50 UG/ML
INJECTION, SOLUTION INTRAMUSCULAR; INTRAVENOUS AS NEEDED
Status: DISCONTINUED | OUTPATIENT
Start: 2018-07-13 | End: 2018-07-14 | Stop reason: HOSPADM

## 2018-07-13 RX ORDER — ROPIVACAINE HYDROCHLORIDE 2 MG/ML
INJECTION, SOLUTION EPIDURAL; INFILTRATION; PERINEURAL AS NEEDED
Status: DISCONTINUED | OUTPATIENT
Start: 2018-07-13 | End: 2018-07-14 | Stop reason: HOSPADM

## 2018-07-13 RX ORDER — NALBUPHINE HYDROCHLORIDE 10 MG/ML
10 INJECTION, SOLUTION INTRAMUSCULAR; INTRAVENOUS; SUBCUTANEOUS
Status: DISCONTINUED | OUTPATIENT
Start: 2018-07-13 | End: 2018-07-14

## 2018-07-13 RX ORDER — METHYLERGONOVINE MALEATE 0.2 MG/ML
0.2 INJECTION INTRAVENOUS AS NEEDED
Status: DISCONTINUED | OUTPATIENT
Start: 2018-07-13 | End: 2018-07-14

## 2018-07-13 RX ADMIN — SODIUM CHLORIDE, SODIUM LACTATE, POTASSIUM CHLORIDE, AND CALCIUM CHLORIDE 1000 ML: 600; 310; 30; 20 INJECTION, SOLUTION INTRAVENOUS at 22:10

## 2018-07-13 RX ADMIN — SODIUM CHLORIDE, SODIUM LACTATE, POTASSIUM CHLORIDE, AND CALCIUM CHLORIDE 1000 ML: 600; 310; 30; 20 INJECTION, SOLUTION INTRAVENOUS at 20:50

## 2018-07-13 RX ADMIN — FENTANYL CITRATE 100 MCG: 50 INJECTION INTRAMUSCULAR; INTRAVENOUS at 22:30

## 2018-07-13 RX ADMIN — Medication 10 ML/HR: at 22:34

## 2018-07-13 RX ADMIN — LIDOCAINE HYDROCHLORIDE AND EPINEPHRINE 4.5 ML: 15; 5 INJECTION, SOLUTION EPIDURAL at 22:26

## 2018-07-13 RX ADMIN — ROPIVACAINE HYDROCHLORIDE 8 ML: 2 INJECTION, SOLUTION EPIDURAL; INFILTRATION; PERINEURAL at 22:32

## 2018-07-13 RX ADMIN — FENTANYL CITRATE 100 MCG: 50 INJECTION, SOLUTION INTRAMUSCULAR; INTRAVENOUS at 22:30

## 2018-07-13 RX ADMIN — LIDOCAINE HYDROCHLORIDE AND EPINEPHRINE 4.5 ML: 15; 5 INJECTION, SOLUTION EPIDURAL at 22:19

## 2018-07-13 RX ADMIN — ROPIVACAINE HYDROCHLORIDE 20 MG: 2 INJECTION, SOLUTION EPIDURAL; INFILTRATION at 22:31

## 2018-07-13 RX ADMIN — SODIUM CHLORIDE, SODIUM LACTATE, POTASSIUM CHLORIDE, AND CALCIUM CHLORIDE 125 ML/HR: 600; 310; 30; 20 INJECTION, SOLUTION INTRAVENOUS at 23:08

## 2018-07-13 RX ADMIN — SODIUM CHLORIDE 5 MILLION UNITS: 900 INJECTION INTRAVENOUS at 21:12

## 2018-07-13 NOTE — H&P
History & Physical    Name: Tee Reyes MRN: [id number]  SSN: [id number]    YOB: 1980  Age:  [address] y.o. Sex: female        Subjective:     Estimated Date of Delivery: 18  OB History      Para Term  AB Living    14 9 5 2 4 9    SAB TAB Ectopic Molar Multiple Live Births    2 2              Ms. Orin Payne presents for evaluation of pregnancy at 37w6d for contractions, started yesterday, seem to be stronger, 3-5 per hour, + FM, no LOF or VB. Prenatal course was complicated by syphilis, notes that she got 2 shots of PCN at [facility] in March. Please see prenatal records for details. Past Medical History:   Diagnosis Date    Syphilis      Past Surgical History:   Procedure Laterality Date    HX DILATION AND CURETTAGE      For TABx2     No Known Allergies  Prior to Admission medications    Medication Sig Start Date End Date Taking? Authorizing Provider   metroNIDAZOLE (METROGEL) 0.75 % vaginal gel Insert 1 Applicator into vagina nightly for 5 days. 7/10/18 7/15/18 Yes Veronica Christianson MD   diphenhydrAMINE (BENADRYL) 25 mg capsule Take 25 mg by mouth every six (6) hours as needed. Yes Historical Provider   acetaminophen (TYLENOL) 325 mg tablet Take 2 Tabs by mouth every four (4) hours as needed for Pain. 3/14/18  Yes Pb Lugo NP   prenatal vit-calcium-iron-fa (PRENATAL PLUS WITH CALCIUM) 27 mg iron- 1 mg tab Take 1 Tab by mouth daily.  17  Yes Veronica Christianson MD      Social History     Occupational History    fast food services      Social History Main Topics    Smoking status: Never Smoker    Smokeless tobacco: Never Used    Alcohol use No      Comment: very rare    Drug use: No      Comment: occasional, last use in October    Sexual activity: Not Currently     Partners: Male      Comment: single     Family History   Problem Relation Age of Onset    Hypertension Maternal Grandmother     Cancer Maternal Grandmother      breast cancer, also great grandmother and great aunts    Diabetes Sister 27    Hypertension Sister 34       Review of Systems: Pertinent items are noted in HPI. Objective:     Vitals:  Vitals:    07/13/18 1646   BP: 112/66   Pulse: 84   Resp: 18   Temp: 97.8 °F (36.6 °C)        Physical Exam:  Patient without distress.   Abdomen: soft, nontender  Fundus: soft and non tender  Perineum: blood absent, amniotic fluid absent  Lower Extremities:  - Edema No  Membranes:  Intact  Fetal Heart Rate: Baseline: 135 per minute  Variability: moderate  Accelerations: yes  Decelerations: none  Uterine contractions: irregular       Recent Results (from the past 24 hour(s))   POC URINE MACROSCOPIC    Collection Time: 07/13/18  5:18 PM   Result Value Ref Range    Color RUDDY      Appearance CLEAR      Spec. gravity (POC) 1.020 1.001 - 1.023      pH, urine  (POC) 7.0 5.0 - 9.0      Protein (POC) 30 (A) NEG mg/dL    Glucose, urine (POC) NEGATIVE  NEG mg/dL    Ketones (POC) NEGATIVE  NEG mg/dL    Bilirubin (POC) SMALL (A) NEG      Blood (POC) NEGATIVE  NEG      Urobilinogen (POC) 4.0 (H) 0.2 - 1.0 EU/dL    Nitrite (POC) NEGATIVE  NEG      Leukocyte esterase (POC) NEGATIVE  NEG      Performed by Ellen Denise        Assessment/Plan:     Patient Active Problem List   Diagnosis Code    9 weeks gestation of pregnancy Z3A.09    Back pain M54.9    Pregnancy Z34.90       Plan: 37w6d   Contractions--> Will recheck cervix in 2 hours   Syphilis--> treated, was told that she was cured, rechecked her titers twice   HSV 2--> Pt denies ever being diagnosed with this, it no on suppression, no titers or HSV testing noted in the chart   Reassuring fetal status   D/C home if not cervical change       Signed By:  Harriet Villasenor MD     July 13, 2018 5:28 PM

## 2018-07-13 NOTE — PROGRESS NOTES
[address] patient arrived to unit from [facility]. Patient complaining of labor pains every 12 minutes and pain 6/10. Patient reports that labor pains started yesterday evening but she decided to wait until she saw the office before she came in. Dr. Flaquita Park called unit prior to patient arriving to let us know she was coming. 1710 [address] 1/50/-3 Dr. Isaiah Medina called and given patient status. 1 Dr. Humphreys Pages at bedside with patient, to check cervix in an hour to see if any change. [address] 1/50/-3 Dr. Isaiah Medina notified of patient status. Patient to walk around and check again in one hour to see if any more change occurs. 1830 Patient up to bathroom and walk around. [address] Patient using birthing ball.    [id number] Patient reports that using the ball is relieving some of her pelvic and sciatic pain

## 2018-07-13 NOTE — PROGRESS NOTES
G 14 P 9@ 37 w 6 d. Grand multips. Has no complaints except she admits she is having labor contractions   q 10 minutes. She denies any bleeding or PROM. See prenatal flow sheet. Normal prenatal exam.  Tested for BV last visit but patient did not  medication as written. RPR-reactive but titer was 1:1. Go to L&D for labor check and   RTC 1 week/prn.

## 2018-07-13 NOTE — PATIENT INSTRUCTIONS
Week 37 of Your Pregnancy: Care Instructions  Your Care Instructions    You are near the end of your pregnancy-and you're probably pretty uncomfortable. It may be harder to walk around. Lying down probably isn't comfortable either. You may have trouble getting to sleep or staying asleep. Most women deliver their babies between 40 and 41 weeks. This is a good time to think about packing a bag for the hospital with items you'll need. Then you'll be ready when labor starts. Follow-up care is a key part of your treatment and safety. Be sure to make and go to all appointments, and call your doctor if you are having problems. It's also a good idea to know your test results and keep a list of the medicines you take. How can you care for yourself at home? Learn about breastfeeding  · Breastfeeding is best for your baby and good for you. · Breast milk has antibodies to help your baby fight infections. · Mothers who breastfeed often lose weight faster, because making milk burns calories. · Learning the best ways to hold your baby will make breastfeeding easier. · Let your partner bathe and diaper the baby to keep your partner from feeling left out. Snuggle together when you breastfeed. · You may want to learn how to use a breast pump and store your milk. · If you choose to bottle feed, make the feeding feel like breastfeeding so you can bond with your baby. Always hold your baby and the bottle. Do not prop bottles or let your baby fall asleep with a bottle. Learn about crying  · It is common for babies to cry for 1 to 3 hours a day. Some cry more, some cry less. · Babies don't cry to make you upset or because you are a bad parent. · Crying is how your baby communicates. Your baby may be hungry; have gas; need a diaper change; or feel cold, warm, tired, lonely, or tense. Sometimes babies cry for unknown reasons. · If you respond to your baby's needs, he or she will learn to trust you.   · Try to stay calm when your baby cries. Your baby may get more upset if he or she senses that you are upset. Know how to care for your   · Your baby's umbilical cord stump will drop off on its own, usually between 1 and 2 weeks. To care for your baby's umbilical cord area:  ¨ Clean the area at the bottom of the cord 2 or 3 times a day. ¨ Pay special attention to the area where the cord attaches to the skin. ¨ Keep the diaper folded below the cord. ¨ Use a damp washcloth or cotton ball to sponge bathe your baby until the stump has come off. · Your baby's first dark stool is called meconium. After the meconium is passed, your baby will develop his or her own bowel pattern. ¨ Some babies, especially  babies, have several bowel movements a day. Others have one or two a day, or one every 2 to 3 days. ¨  babies often have loose, yellow stools. Formula-fed babies have more formed stools. ¨ If your baby's stools look like little pellets, he or she is constipated. After 2 days of constipation, call your baby's doctor. · If your baby will be circumcised, you can care for him at home. ¨ Gently rinse his penis with warm water after every diaper change. Do not try to remove the film that forms on the penis. This film will go away on its own. Pat dry. ¨ Put petroleum ointment, such as Vaseline, on the area of the diaper that will touch your baby's penis. This will keep the diaper from sticking to your baby. ¨ Ask the doctor about giving your baby acetaminophen (Tylenol) for pain. Where can you learn more? Go to http://inocencia-hank.info/. Enter 68  97 in the search box to learn more about \"Week 37 of Your Pregnancy: Care Instructions. \"  Current as of: 2017  Content Version: 11.7  © 7768-0474 [employer]. Care instructions adapted under license by [employer] (which disclaims liability or warranty for this information).  If you have questions about a medical condition or this instruction, always ask your healthcare professional. [place] [address] any warranty or liability for your use of this information.

## 2018-07-13 NOTE — IP AVS SNAPSHOT
[address] [id number] [address] Patient: Cassy Rodríguez MRN: [id number] [id number] About your hospitalization You were admitted on:  July 13, 2018 You last received care in the:  [facility] [address] You were discharged on:  July 16, 2018 Why you were hospitalized Your primary diagnosis was:  Not on File Your diagnoses also included:  Pregnancy Follow-up Information Follow up With Details Comments Contact Info Taurus Reyna MD In 2 weeks  501 [facility] 205 [facility] [id number] [phone] Arnaldo Nicholas MD   [facility] 1394 [facility] 200 975 [address] [address] 
[phone] Discharge Orders None A check mallory indicates which time of day the medication should be taken. My Medications START taking these medications Instructions Each Dose to Equal  
 Morning Noon Evening Bedtime  
 ascorbic acid (vitamin C) 250 mg tablet Commonly known as:  VITAMIN C Your last dose was: Your next dose is: Take 1 Tab by mouth two (2) times a day. 250 mg  
    
   
   
   
  
 ferrous sulfate 325 mg (65 mg iron) tablet Your last dose was: Your next dose is: Take 1 Tab by mouth two (2) times daily (with meals). 325 mg  
    
   
   
   
  
 ibuprofen 800 mg tablet Commonly known as:  MOTRIN Your last dose was: Your next dose is: Take 1 Tab by mouth every eight (8) hours as needed. 800 mg  
    
   
   
   
  
 oxyCODONE-acetaminophen 5-325 mg per tablet Commonly known as:  PERCOCET Your last dose was: Your next dose is: Take 1 Tab by mouth every four (4) hours as needed. Max Daily Amount: 6 Tabs. 1 Tab CONTINUE taking these medications  Instructions Each Dose to Equal  
 Morning Noon Evening Bedtime  
 acetaminophen 325 mg tablet Commonly known as:  TYLENOL Your last dose was: Your next dose is: Take 2 Tabs by mouth every four (4) hours as needed for Pain. 650 mg  
    
   
   
   
  
 BENADRYL 25 mg capsule Generic drug:  diphenhydrAMINE Your last dose was: Your next dose is: Take 25 mg by mouth every six (6) hours as needed. 25 mg  
    
   
   
   
  
 prenatal vit-calcium-iron-fa 27 mg iron- 1 mg Tab Commonly known as:  PRENATAL PLUS with CALCIUM Your last dose was: Your next dose is: Take 1 Tab by mouth daily. 1 Tab STOP taking these medications   
 metroNIDAZOLE 0.75 % vaginal gel Commonly known as:  Ryan Shelton Where to Get Your Medications These medications were sent to [facility]/pharmacy #[id number]- [address] ([address] [facility] [address]), [address]  [address] Phone:  [phone]  
  ascorbic acid (vitamin C) 250 mg tablet  
 ferrous sulfate 325 mg (65 mg iron) tablet  
 ibuprofen 800 mg tablet Information on where to get these meds will be given to you by the nurse or doctor. ! Ask your nurse or doctor about these medications  
  oxyCODONE-acetaminophen 5-325 mg per tablet Opioid Education Prescription Opioids: What You Need to Know: 
 
Prescription opioids can be used to help relieve moderate-to-severe pain and are often prescribed following a surgery or injury, or for certain health conditions. These medications can be an important part of treatment but also come with serious risks. Opioids are strong pain medicines. Examples include hydrocodone, oxycodone, fentanyl, and morphine. Heroin is an example of an illegal opioid. It is important to work with your health care provider to make sure you are getting the safest, most effective care. WHAT ARE THE RISKS AND SIDE EFFECTS OF OPIOID USE? Prescription opioids carry serious risks of addiction and overdose, especially with prolonged use. An opioid overdose, often marked by slow breathing, can cause sudden death. The use of prescription opioids can have a number of side effects as well, even when taken as directed. · Tolerance-meaning you might need to take more of a medication for the same pain relief · Physical dependence-meaning you have symptoms of withdrawal when the medication is stopped. Withdrawal symptoms can include nausea, sweating, chills, diarrhea, stomach cramps, and muscle aches. Withdrawal can last up to several weeks, depending on which drug you took and how long you took it. · Increased sensitivity to pain · Constipation · Nausea, vomiting, and dry mouth · Sleepiness and dizziness · Confusion · Depression · Low levels of testosterone that can result in lower sex drive, energy, and strength · Itching and sweating RISKS ARE GREATER WITH:      
· History of drug misuse, substance use disorder, or overdose · Mental health conditions (such as depression or anxiety) · Sleep apnea · Older age (72 years or older) · Pregnancy Avoid alcohol while taking prescription opioids. Also, unless specifically advised by your health care provider, medications to avoid include: · Benzodiazepines (such as Xanax or Valium) · Muscle relaxants (such as Soma or Flexeril) · Hypnotics (such as Ambien or Lunesta) · Other prescription opioids KNOW YOUR OPTIONS Talk to your health care provider about ways to manage your pain that don't involve prescription opioids. Some of these options may actually work better and have fewer risks and side effects. Options may include: 
· Pain relievers such as acetaminophen, ibuprofen, and naproxen · Some medications that are also used for depression or seizures · Physical therapy and exercise · Counseling to help patients learn how to cope better with triggers of pain and stress. · Application of heat or cold compress · Massage therapy · Relaxation techniques Be Informed Make sure you know the name of your medication, how much and how often to take it, and its potential risks & side effects. IF YOU ARE PRESCRIBED OPIOIDS FOR PAIN: 
· Never take opioids in greater amounts or more often than prescribed. Remember the goal is not to be pain-free but to manage your pain at a tolerable level. · Follow up with your primary care provider to: · Work together to create a plan on how to manage your pain. · Talk about ways to help manage your pain that don't involve prescription opioids. · Talk about any and all concerns and side effects. · Help prevent misuse and abuse. · Never sell or share prescription opioids · Help prevent misuse and abuse. · Store prescription opioids in a secure place and out of reach of others (this may include visitors, children, friends, and family). · Safely dispose of unused/unwanted prescription opioids: Find your community drug take-back program or your pharmacy mail-back program, or flush them down the toilet, following guidance from the Food and Drug Administration (www.fda.gov/Drugs/ResourcesForYou). · Visit www.cdc.gov/drugoverdose to learn about the risks of opioid abuse and overdose. · If you believe you may be struggling with addiction, tell your health care provider and ask for guidance or call [facility] at 3-158-806-QIRN. Discharge Instructions Discharge Instructions: Vaginal Delivery Signs of Illness: CALL YOUR PROVIDER IF ANY OF THE FOLLOWING OCCUR 1. Temperature of 100.4 or above 2. Chills 3. Severe abdominal pain 4. Excessive vaginal bleeding (more than 1 pad/hour, or any bleeding like a heavy period over the next 2 weeks). 5. Large amount of clots 6. Unusual discharge or drainage 7. Discharge with foul odor 8. Pain or burning on urination 9. Painful, reddened, tender breasts 10: Pain/swelling/redness in the calf of your legs. 11. Other symptoms you do not understand Activity 1. Rest when your baby rests 2. 1-2 weeks after delivery, gradually increase your activity General Concerns 1. Eat healthy, proper nutrition and adequate fluids are essential for healing, strength and energy. 2. Continue monthly self breast exams 3. No douching, tampons or intercourse for 6 weeks 4. No tub baths, pools, or hot tubs for 6 weeks Follow-up Call you provider on the day of discharge to schedule a follow-up appointment in 2 weeks. Call [phone] if you have any questions, and ask to speak with a nurse. DISCHARGE SUMMARY from Nurse The following personal items collected during your admission are returned to you:  
Dental Appliance: Dental Appliances: None Vision: Visual Aid: None Hearing Aid:   
Jewelry: Jewelry: Necklace, Ring (rings x3) Clothing: Clothing: At bedside, Footwear, Pants, Shirt, Undergarments Other Valuables: Other Valuables: Cell Phone Valuables sent to safe:   
 
 
 
 
 
 
*  Please give a list of your current medications to your Primary Care Provider. *  Please update this list whenever your medications are discontinued, doses are 
    changed, or new medications (including over-the-counter products) are added. *  Please carry medication information at all times in case of emergency situations. These are general instructions for a healthy lifestyle: No smoking/ No tobacco products/ Avoid exposure to second hand smoke Surgeon General's Warning:  Quitting smoking now greatly reduces serious risk to your health. Obesity, smoking, and sedentary lifestyle greatly increases your risk for illness A healthy diet, regular physical exercise & weight monitoring are important for maintaining a healthy lifestyle You may be retaining fluid if you have a history of heart failure or if you experience any of the following symptoms:  Weight gain of 3 pounds or more overnight or 5 pounds in a week, increased swelling in our hands or feet or shortness of breath while lying flat in bed. Please call your doctor as soon as you notice any of these symptoms; do not wait until your next office visit. Recognize signs and symptoms of STROKE: 
 
F-face looks uneven A-arms unable to move or move unevenly S-speech slurred or non-existent T-time-call 911 as soon as signs and symptoms begin-DO NOT go Back to bed or wait to see if you get better-TIME IS BRAIN. The discharge information has been reviewed with the patient. The patient verbalized understanding. Patient armband removed and shredded MyChart Activation Thank you for requesting access to WebEvents. Please follow the instructions below to securely access and download your online medical record. WebEvents allows you to send messages to your doctor, view your test results, renew your prescriptions, schedule appointments, and more. How Do I Sign Up? 1. In your internet browser, go to [email or web address]. 2. Click on the First Time User? Click Here link in the Sign In box. You will see the New Member Sign Up page. 3. Enter your WebEvents Access Code exactly as it appears below. You will not need to use this code after youve completed the sign-up process. If you do not sign up before the expiration date, you must request a new code. WebEvents Access Code: [email or web address] Expires: 2018 10:37 AM (This is the date your WebEvents access code will ) 4. Enter the last four digits of your Social Security Number (xxxx) and Date of Birth (mm/dd/yyyy) as indicated and click Submit. You will be taken to the next sign-up page. 5. Create a Testliot ID. This will be your [facility] login ID and cannot be changed, so think of one that is secure and easy to remember. 6. Create a [facility] password. You can change your password at any time. 7. Enter your Password Reset Question and Answer. This can be used at a later time if you forget your password. 8. Enter your e-mail address. You will receive e-mail notification when new information is available in [address] 9. Click Sign Up. You can now view and download portions of your medical record. 10. Click the Download Summary menu link to download a portable copy of your medical information. Additional Information If you have questions, please visit the Frequently Asked Questions section of the [facility] website at [email or web address] Remember, [facility] is NOT to be used for urgent needs. For medical emergencies, dial 911. After Your Delivery (the Postpartum Period): After Your Visit Your Care Instructions Congratulations on the birth of your baby. Like pregnancy, the  period can be a time of excitement, yuri, and exhaustion. You may look at your wondrous little baby and feel happy. You may also be overwhelmed by your new sleep hours and new responsibilities. At first, babies often sleep during the days and are awake at night. They do not have a pattern or routine. They may make sudden gasps, jerk themselves awake, or look like they have crossed eyes. These are all normal, and they may even make you smile. In these first weeks after delivery, try to take good care of yourself. It may take 4 to 6 weeks to feel like yourself again, and possibly longer if you had a  birth. You will likely feel very tired for several weeks. Your days will be full of ups and downs, but lots of yuri as well. Follow-up care is a key part of your treatment and safety.  Be sure to make and go to all appointments, and call your doctor if you are having problems. Its also a good idea to know your test results and keep a list of the medicines you take. How can you care for yourself at home? Take care of your body after delivery · Use pads instead of tampons for the bloody flow that may last as long as 2 weeks. · Ease cramps with acetaminophen (Tylenol). · Ease soreness of hemorrhoids and the area between your vagina and rectum with ice compresses or witch hazel pads. · Ease constipation by drinking lots of fluid and eating high-fiber foods. Ask your doctor about over-the-counter stool softeners. · Cleanse yourself with a gentle squeeze of warm water from a bottle instead of wiping with toilet paper. · Take a sitz bath in warm water several times a day. · Wear a good nursing bra. Ease sore and swollen breasts with warm, wet washcloths. · If you are not breast-feeding, use ice rather than heat for breast soreness. · Your period may not start for several months if you are breast-feeding. You may bleed more, and longer at first, than you did before you got pregnant. · Wait until you are healed (about 4 to 6 weeks) before you have sexual intercourse. Your doctor will tell you when it is okay to have sex. · Try not to travel with your baby for 5 or 6 weeks. If you take a long car trip, make frequent stops to walk around and stretch. Avoid exhaustion · Rest every day. Try to nap when your baby naps. · Ask another adult to be with you for a few days after delivery. · Plan for  if you have other children. · Stay flexible so you can eat at odd hours and sleep when you need to. Both you and your baby are making new schedules. · Plan small trips to get out of the house. Change can make you feel less tired. · Ask for help with housework, cooking, and shopping. Remind yourself that your job is to care for your baby. Know about help for postpartum depression · \"Baby blues are common for the first 1 to 2 weeks after birth.  You may cry or feel sad or irritable for no reason. · Rest whenever you can. Being tired makes it harder to handle your emotions. · Go for walks with your baby. · Talk to your partner, friends, and family about your feelings. · If your symptoms last for more than a few weeks, or if you feel very depressed, ask your doctor for help. · Postpartum depression can be treated. Support groups and counseling can help. Sometimes medicine can also help. Stay healthy · Eat healthy foods so you have more energy, make good breast milk, and lose extra baby pounds. · If you breast-feed, avoid alcohol and drugs. Stay smoke-free. If you quit during pregnancy, congratulations. · Start daily exercise after 4 to 6 weeks, but rest when you feel tired. · Learn exercises to tone your belly. Do Kegel exercises to regain strength in your pelvic muscles. You can do these exercises while you stand or sit. ¨ Squeeze the same muscles you would use to stop your urine. Your belly and rear end (buttocks) should not move. ¨ Hold the squeeze for 3 seconds, then relax for 3 seconds. ¨ Repeat the exercise 10 to 15 times for each session. Do three or more sessions each day. · Find a class for new mothers and new babies that has an exercise time. · If you had a  birth, give yourself a bit more time before you exercise, and be careful. When should you call for help? Call 911 anytime you think you may need emergency care. For example, call if: 
· You have sudden, severe pain in your belly. · You passed out (lost consciousness). Call your doctor now or seek immediate medical care if: 
· You have severe vaginal bleeding. You are passing blood clots and soaking through a pad each hour for 2 or more hours. · Your vaginal bleeding seems to be getting heavier or is still bright red 4 days after delivery, or you pass blood clots larger than the size of a golf ball. · You are dizzy or lightheaded, or you feel like you may faint. · You are vomiting or cannot keep fluids down. · You have a fever. · You have new or more belly pain. · You pass tissue (not just blood). · Your breast or breasts have hard, red, or tender areas. · You have an urgent or frequent need to urinate, along with a burning feeling. · You have severe pain, tenderness, or swelling in your vagina or the area between your rectum and vagina. · You have severe pains in your chest, belly, back, or legs. · You have feelings of severe despair or great anxiety. · Your baby is unusually cranky or is sleeping too much. · Your baby's eyes are red or have discharge. · Your baby has white patches on the roof and sides of the mouth or tongue. · Your baby's umbilical cord is foul-smelling, swollen, red, or leaking pus. · There is blood or mucus in your baby's bowel movements. · Your baby has fewer than 6 wet diapers a day. · Your baby does not want to eat, or your baby is throwing up with every feeding. · Your baby has trouble breathing. · Your baby has a rectal temperature of 100.4°F or more, or an underarm temperature over 99.4°F. 
· You baby has a low temperature less than 97.5°F rectal, or less than 97. 0°F underarm. · Your baby's skin looks yellow. Watch closely for changes in your health, and be sure to contact your doctor if you have any problems. Where can you learn more? Go to SoLatina.be Enter A461 in the search box to learn more about \"After Your Delivery (the Postpartum Period): After Your Visit. \"  
© 3474-8993 Healthwise, Incorporated. Care instructions adapted under license by [employer] (which disclaims liability or warranty for this information). This care instruction is for use with your licensed healthcare professional. If you have questions about a medical condition or this instruction, always ask your healthcare professional. [address] any warranty or liability for your use of this information. Content Version: 9.3.44649; Last Revised: July 8, 2010 Depression After Childbirth: After Your Visit Your Care Instructions Many women get the \"baby blues\" during the first few days after childbirth. They may lose sleep, feel irritable, cry easily, and feel happy one minute and sad the next. Hormone changes are one cause of these emotional changes. Also, the demands of a new baby, coupled with visits from relatives or other family needs, add to a mother's stress. The \"baby blues\" usually peak around the fourth day and then ease up in less than 2 weeks. If your moodiness or anxiety lasts for more than 2 weeks, or if you feel like life is not worth living, you may have postpartum depression. This is different for each mother. Some mothers with serious depression may worry intensely about their infant's well-being, while others may feel distant from their child. Some mothers might even feel that they might harm their baby. A mother may have signs of paranoia, wondering if someone is watching her. Depression is not a sign of weakness. It is a medical condition that requires treatment. Medicine and counseling are often effective at reducing depression. Talk to your doctor about taking antidepressant medicine while breast-feeding. Follow-up care is a key part of your treatment and safety. Be sure to make and go to all appointments, and call your doctor if you are having problems. Its also a good idea to know your test results and keep a list of the medicines you take. How can you care for yourself at home? · Take your medicines exactly as prescribed. Call your doctor if you think you are having a problem with your medicine. · Eat a balanced diet, so that you can keep up your energy. · Get regular daily exercise, such as walks, to help improve your mood. · Get as much sunlight as possible. Keep your shades and curtains open, and get outside as much as you can. · Avoid using alcohol or other substances to feel better. · Get as much rest and sleep as possible, and avoid doing too much. Being too tired can increase depression. · Play stimulating music throughout your day and soothing music at night. · Schedule outings and visits with friends and family. Ask them to call you regularly, so that you do not feel alone. · Ask for help with preparing food and other daily tasks. Family and friends are often happy to help a mother with a . · Be honest with yourself and those who care about you. Tell them about your struggle. · Join a support group of new mothers. No one can better understand the challenges of caring for a  than other new mothers. When should you call for help? Call 911 anytime you think you may need emergency care. For example, call if: 
· You feel you cannot stop from hurting yourself or someone else. Call your doctor now or seek immediate medical care if: 
· You are having trouble caring for yourself or your baby. · You have signs of paranoia that can occur with postpartum depression. You fear that someone is watching you, stealing from you, or reading your mind. · You hear voices. · You have symptoms of postpartum depression, such as: ¨ Sleeplessness. ¨ Anxiety. ¨ Hopelessness. ¨ Irritability. ¨ Poor concentration. · Someone you know has depression and: 
¨ Starts to give away his or her possessions. ¨ Uses illegal drugs or drinks alcohol heavily. ¨ Talks or writes about death, including writing suicide notes and talking about guns, knives, or pills. ¨ Starts to spend a lot of time alone. ¨ Acts very aggressively or suddenly appears calm. Watch closely for changes in your health, and be sure to contact your doctor if you have any problems. Where can you learn more? Go to PharmaSecure.be Enter V322 in the search box to learn more about \"Depression After Childbirth: After Your Visit. \"  
 © 1809-2952 Healthwise, Incorporated. Care instructions adapted under license by [facility] [employer] (which disclaims liability or warranty for this information). This care instruction is for use with your licensed healthcare professional. If you have questions about a medical condition or this instruction, always ask your healthcare professional. [address] any warranty or liability for your use of this information. Content Version: 9.3.28146; Last Revised: April 18, 2011 Breast Self-Exam: After Your Visit Your Care Instructions A breast self-exam is when you check your breasts for lumps or changes. This regular exam helps you learn how your breasts normally look and feel. Most breast problems or changes are not because of cancer. Breast self-exam is not a substitute for a mammogram. Having regular breast exams by your doctor and regular mammograms improve your chances of finding any problems with your breasts. Some women set a time each month to do a step-by-step breast self-exam. Other women like a less formal system. They might look at their breasts as they brush their teeth, or feel their breasts once in a while in the shower. If you notice a change in your breast, tell your doctor. Follow-up care is a key part of your treatment and safety. Be sure to make and go to all appointments, and call your doctor if you are having problems. Its also a good idea to know your test results and keep a list of the medicines you take. How do you do a breast self-exam? 
· The best time to examine your breasts is usually one week after your menstrual period begins. Your breasts should not be tender then. If you do not have periods, you might do your exam on a day of the month that is easy to remember. · To examine your breasts: ¨ Remove all your clothes above the waist and lie down.  When you are lying down, your breast tissue spreads evenly over your chest wall, which makes it easier to feel all your breast tissue. ¨ Use the padsnot the fingertipsof the 3 middle fingers of your left hand to check your right breast. Move your fingers slowly in small coin-sized circles that overlap. ¨ Use three levels of pressure to feel of all your breast tissue. Use light pressure to feel the tissue close to the skin surface. Use medium pressure to feel a little deeper. Use firm pressure to feel your tissue close to your breastbone and ribs. Use each pressure level to feel your breast tissue before moving on to the next spot. ¨ Check your entire breast, moving up and down as if following a strip from the collarbone to the bra line, and from the armpit to the ribs. Repeat until you have covered the entire breast. 
¨ Repeat this procedure for your left breast, using the pads of the 3 middle fingers of your right hand. · To examine your breasts while in the shower: 
¨ Place one arm over your head and lightly soap your breast on that side. ¨ Using the pads of your fingers, gently move your hand over your breast (in the strip pattern described above), feeling carefully for any lumps or changes. ¨ Repeat for the other breast. 
· Have your doctor inspect anything you notice to see if you need further testing. Where can you learn more? Go to Deposco.be Enter P148 in the search box to learn more about \"Breast Self-Exam: After Your Visit. \"  
© 9170-7147 Healthwise, Incorporated. Care instructions adapted under license by [employer] (which disclaims liability or warranty for this information). This care instruction is for use with your licensed healthcare professional. If you have questions about a medical condition or this instruction, always ask your healthcare professional. [place] [address] any warranty or liability for your use of this information. Content Version: 9.3.46449; Last Revised: September 6, 2011 Bottle-Feeding: After Your Visit Your Care Instructions Your reasons for wanting to bottle-feed your baby with formula are personal. You and your partner can make the best decision for you and your baby. Formulas can provide all the calories and nutrients your baby needs in the first 6 months of life. Several types of formulas are available. Most babies start with a cow's milkbased formula, such as Enfamil, Good Start, or Similac. Talk to your doctor before trying other types of formulas, which include soy and lactose-free formulas. At first, preparing the bottles and formula can seem confusing, but it gets easier and faster with some practice. Your  baby probably will want to eat every 2 to 3 hours. Do not worry about the exact timing for the first few weeks, but feed your baby whenever he or she is hungry. In general, your baby should not go longer than 4 hours without eating during the day for the first few months. Sit in a comfortable chair with your arms supported on pillows. Look into your baby's eyes and talk or sing while you are giving the bottle. Enjoy this special time you have with your baby. Follow-up care is a key part of your childs treatment and safety. Be sure to make and go to all appointments, and call your doctor if your child is having problems. Its also a good idea to know your childs test results and keep a list of the medicines your child takes. At each well-baby visit, talk to your doctor about your baby's nutritional needs, which change as he or she grows and develops. How can you care for yourself at home? · Prepare your supplies for bottle-feeding before your baby is born, if possible. ¨ Have a supply of small bottles (usually 4 ounces) for your baby's first few weeks. ¨ You may want to buy a variety of bottle nipples so you can see which type your baby likes. ¨ Before using bottles and nipples the first time, wash them in hot water and dish soap and rinse with hot water. · Ask your doctor which formula to use. You can buy formula as a liquid concentrate or a powder that you mix with water. Formulas also come in a ready-to-feed form. Always use formula with added iron unless the doctor says not to. · Make sure you have clean, safe water to mix with the formula. Boil watereven bottled waterfor 1 to 2 minutes, and let it cool before mixing it with formula. · Wash your hands before preparing formula. · Read the label to see how much water to mix with the formula. If you add too little water, it can upset your baby's stomach. If you add too much water, your baby will not get the right nutrition. · Cover the prepared formula and store it in a refrigerator. Use it within 24 hours. · Soak dirty baby bottles in water and dish soap. Wash bottles and nipples in the upper rack of the  or hand-wash them in hot water with dish soap. To bottle-feed your baby · Warm the formula to room temperature or body temperature before feeding. The best way to warm it is in a pan of heated water. Do not use a microwave, which can cause hot spots in the formula that can burn your baby's mouth. · Before feeding your baby, check the temperature of the formula by dripping 2 or 3 drops on the inside of your wrist. It should be warm, not cold or hot. · Place a bib or cloth under your baby's chin to help keep clothes clean. Have a second cloth handy to use when burping your baby. · Support your baby with one arm, with your baby's head resting in the bend of your elbow. Keep your baby's head higher than his or her chest. 
· Stroke the center of your baby's lower lip to encourage the mouth to open wider. A wide mouth will cover more of the nipple and will help reduce the amount of air the baby sucks in. · Angle the bottle so the neck of the bottle and the nipple stay full of milk. This helps reduce how much air your baby swallows. · Do not prop the bottle in your baby's mouth or let him or her hold it alone. This increases your baby's chances of choking or getting ear infections. · During the first few weeks, burp your baby after every 2 ounces of formula. This helps get rid of swallowed air and reduces spitting up. · You will know your baby is full when he or she stops sucking. Your baby may spit out the nipple, turn his or her head away, or fall asleep when full.  babies usually drink from 1 to 3 ounces each feeding. · Throw away any formula left in the bottle after you have fed your baby. Bacteria can grow in the leftover formula. · It can be helpful to hold your baby upright for about 30 minutes after eating to reduce spitting up. When should you call for help? Watch closely for changes in your child's health, and be sure to contact your doctor if: 
· Your child does not seem to be growing and gaining weight. · Your child has trouble passing stools, or his or her stools are hard and dry. · Your child is vomiting. · Your child has diarrhea or a skin rash. · Your child cries most of the time. · Your child has gas, bloating, or cramps after drinking a bottle. Where can you learn more? Go to Chogger.be Enter P111 in the search box to learn more about \"Bottle-Feeding: After Your Visit. \"  
© 0342-6279 Healthwise, Incorporated. Care instructions adapted under license by [employer] (which disclaims liability or warranty for this information). This care instruction is for use with your licensed healthcare professional. If you have questions about a medical condition or this instruction, always ask your healthcare professional. [place] [address] any warranty or liability for your use of this information. Content Version: 9.3.60509; Last Revised: 2012 Introducing [facility]!    
 [employer] introduces [employer] patient portal. Now you can access parts of your medical record, email your doctor's office, and request medication refills online. 1. In your internet browser, go to [email or web address] 2. Click on the First Time User? Click Here link in the Sign In box. You will see the New Member Sign Up page. 3. Enter your Are[employer] Access Code exactly as it appears below. You will not need to use this code after youve completed the sign-up process. If you do not sign up before the expiration date, you must request a new code. · [employer] Access Code: [email or web address] Expires: 9/12/2018 10:37 AM 
 
4. Enter the last four digits of your Social Security Number (xxxx) and Date of Birth (mm/dd/yyyy) as indicated and click Submit. You will be taken to the next sign-up page. 5. Create a Arecont Vision ID. This will be your Are[employer] login ID and cannot be changed, so think of one that is secure and easy to remember. 6. Create a Are[employer] password. You can change your password at any time. 7. Enter your Password Reset Question and Answer. This can be used at a later time if you forget your password. 8. Enter your e-mail address. You will receive e-mail notification when new information is available in [address] 9. Click Sign Up. You can now view and download portions of your medical record. 10. Click the Download Summary menu link to download a portable copy of your medical information. If you have questions, please visit the Frequently Asked Questions section of the Arecont Vision website. Remember, [employer] is NOT to be used for urgent needs. For medical emergencies, dial 911. Now available from your iPhone and Android! Introducing Stan Hunt As a [facility] patient, I wanted to make you aware of our electronic visit tool called Stan Hunt. [facility] 24/7 allows you to connect within minutes with a medical provider 24 hours a day, seven days a week via a mobile device or tablet or logging into a secure website from your computer. You can access [facility] from anywhere in the [place]. A virtual visit might be right for you when you have a simple condition and feel like you just dont want to get out of bed, or cant get away from work for an appointment, when your regular [employer] provider is not available (evenings, weekends or holidays), or when youre out of town and need minor care. Electronic visits cost only $49 and if the [employer] 24/7 provider determines a prescription is needed to treat your condition, one can be electronically transmitted to a nearby pharmacy*. Please take a moment to enroll today if you have not already done so. The enrollment process is free and takes just a few minutes. To enroll, please download the [employer] 24/7 mesfin to your tablet or phone, or visit [email or web address] to enroll on your computer. And, as an [address] [place] [address] patient with a [employer] account, the results of your visits will be scanned into your electronic medical record and your primary care provider will be able to view the scanned results. We urge you to continue to see your regular [employer] provider for your ongoing medical care. And while your primary care provider may not be the one available when you seek a [employer] virtual visit, the peace of mind you get from getting a real diagnosis real time can be priceless. For more information on Arch[employer], view our Frequently Asked Questions (FAQs) at [email or web address]. Sincerely, 
 
Radha Doyle MD 
Chief Medical Officer [employer] *:  certain medications cannot be prescribed via Arch[employer]onic Providers Seen During Your Hospitalization Provider Specialty Primary office phone Keyla Hernandez MD Obstetrics & Gynecology [phone] Your Primary Care Physician (PCP) Primary Care Physician Office Phone Office Fax Fabiana Otto [phone] You are allergic to the following No active allergies Recent Documentation Height Weight Breastfeeding? BMI OB Status Smoking Status 1.727 m 81.6 kg No 27.37 kg/m2 Pregnant Never Smoker Emergency Contacts Name Discharge Info Relation Home Work Mobile Lizzy Home  Mother [14] [phone] Patient Belongings The following personal items are in your possession at time of discharge: 
  Dental Appliances: None  Visual Aid: None      Home Medications: None   Jewelry: Necklace, Ring (rings x3)  Clothing: At bedside, Footwear, Pants, Shirt, Undergarments    Other Valuables: Cell Phone Please provide this summary of care documentation to your next provider. Signatures-by signing, you are acknowledging that this After Visit Summary has been reviewed with you and you have received a copy. Patient Signature:  ____________________________________________________________ Date:  ____________________________________________________________  
  
Robert Sport Provider Signature:  ____________________________________________________________ Date:  ____________________________________________________________

## 2018-07-13 NOTE — PROGRESS NOTES
1920: Verbal shift change report given to REBA Mayes RN (oncoming nurse) by BABAK Sahu RN (offgoing nurse). Report included the following information SBAR. Upon entering room pt OOB family members at bedside performing back massage. Pt assisted back to bed. Pt repositioned self to left side-lying position for comfort. EFM and toco applied. 1957: SVE 4/60/-3. Bulging bag noted. slightly ballotable. Unable to verify placement. 2007: SVE 4/60/-3 performed by REBA Rich RN for verification. 2015: Dr. Susan Rabago notified of SVE, ctx pattern, reactive FHR. OB evaluation of strip requested for possible late decelerations at 1800 and 1817. Orders received for admission. 2041: Orders received for GBS treatment. 2119: Dr. Susan Rabago at bedside. 2155: Dr. Lesa Peguero and Dr. Susan Rabago at bedside, SVE 5cm. Anesthesia paged for epidural.     2157: AROM performed by Dr. Lesa Peguero. Blood tinged-clear fluid noted. 2210: Dr. Waleska Neal at bedside for epidural.    2212: Pt repositioned to side of bed for epidural placement. 2214: Timeout complete. 2216: Clear fluid noted to pad.     2219: Test dose complete. Increase in maternal heart rate noted. Epidural catheter removed by provider, blue tip intact. 2226: Test dose complete. No increase in maternal heart rate noted. 2253: Primary RN called to pt room. Upon entering room Dr. Lesa Peguero and REBA Rich RN at bedside . Pt lethargic but responsive, able answer questions, and shaking. Pt lying flat, 02 delivery at 10 lmp via non-rebreather mask, anesthesia on the way for evaluation. 2256: SVE by Dr. Lesa Peguero 5cm     2254: Dr. Waleska Neal at bedside. 2300: Orders received by Dr. Waleska Neal hold epidural infusion    2315: SVE 5 cm     2320: Pt repositioned to right hip tilted, HOB elevated 30.     2340: Orders received from Dr. Waleska Neal ok to restart epidural infusion in 15 minutes. 0030: Epidural infusion restarted. RN remains at bedside. 0121: Pt c/o ctx pain to right side.  Pt repositioned to right side-lying position. 0130: IVF bolus     0133: 02 applied via tight-fitting face mask @ 10 lmp. HOB elevated. 0250: SVE 6.5/80/-3    0300: Pt repositioned to left side lying position. Peanut ball between legs. 0315: Pt repositioned to right side-lying position. Peanut ball between legs. 5393: SVE 8 cm    0348: Dr. Dontrell Knox notified of [address]. Notify MD and Dr. Sylvie Brooks for delivery. 0407: SVE right anterior lip. Pt repositioned to right hip tilt. 0411: O2 applied via tight fitting face mask @ 10 lmp     0417: Left hip tilt. IVF bolus. 3480: Dr. Dontrell Knox and Dr. Sylvie Brooks notified. Nursery called. 3439: Dr. Sylvie Brooks and Dr. Dontrell Knox at bedside for delivery. RN remains at bedside evaluating strip.     0449: Birth of viable male infant apgars 8&9    0: Bedside and Verbal shift change report given to Penny Sanchez RN (oncoming nurse) by REBA Mayes RN (offgoing nurse). Report included the following information SBAR, Kardex, Intake/Output, MAR and Recent Results.

## 2018-07-14 PROCEDURE — 59025 FETAL NON-STRESS TEST: CPT

## 2018-07-14 PROCEDURE — 75410000000 HC DELIVERY VAGINAL/SINGLE

## 2018-07-14 PROCEDURE — 75410000002 HC LABOR FEE PER 1 HR

## 2018-07-14 PROCEDURE — 65270000029 HC RM PRIVATE

## 2018-07-14 PROCEDURE — 75410000003 HC RECOV DEL/VAG/CSECN EA 0.5 HR

## 2018-07-14 PROCEDURE — 74011250637 HC RX REV CODE- 250/637: Performed by: OBSTETRICS & GYNECOLOGY

## 2018-07-14 PROCEDURE — 74011250636 HC RX REV CODE- 250/636: Performed by: OBSTETRICS & GYNECOLOGY

## 2018-07-14 PROCEDURE — 76060000078 HC EPIDURAL ANESTHESIA

## 2018-07-14 PROCEDURE — 77010026065 HC OXYGEN MINIMUM MEDICAL AIR

## 2018-07-14 RX ORDER — OXYCODONE AND ACETAMINOPHEN 5; 325 MG/1; MG/1
1 TABLET ORAL
Status: DISCONTINUED | OUTPATIENT
Start: 2018-07-14 | End: 2018-07-16 | Stop reason: HOSPADM

## 2018-07-14 RX ORDER — PROMETHAZINE HYDROCHLORIDE 25 MG/ML
25 INJECTION, SOLUTION INTRAMUSCULAR; INTRAVENOUS
Status: DISCONTINUED | OUTPATIENT
Start: 2018-07-14 | End: 2018-07-16 | Stop reason: HOSPADM

## 2018-07-14 RX ORDER — AMOXICILLIN 250 MG
1 CAPSULE ORAL
Status: DISCONTINUED | OUTPATIENT
Start: 2018-07-14 | End: 2018-07-16 | Stop reason: HOSPADM

## 2018-07-14 RX ORDER — HYDROCORTISONE 25 MG/G
CREAM TOPICAL AS NEEDED
Status: DISCONTINUED | OUTPATIENT
Start: 2018-07-14 | End: 2018-07-16 | Stop reason: HOSPADM

## 2018-07-14 RX ORDER — ZOLPIDEM TARTRATE 5 MG/1
5 TABLET ORAL
Status: DISCONTINUED | OUTPATIENT
Start: 2018-07-14 | End: 2018-07-16 | Stop reason: HOSPADM

## 2018-07-14 RX ORDER — ACETAMINOPHEN 325 MG/1
650 TABLET ORAL
Status: DISCONTINUED | OUTPATIENT
Start: 2018-07-14 | End: 2018-07-16 | Stop reason: HOSPADM

## 2018-07-14 RX ORDER — IBUPROFEN 400 MG/1
800 TABLET ORAL
Status: DISCONTINUED | OUTPATIENT
Start: 2018-07-14 | End: 2018-07-16 | Stop reason: HOSPADM

## 2018-07-14 RX ADMIN — OXYCODONE HYDROCHLORIDE AND ACETAMINOPHEN 1 TABLET: 5; 325 TABLET ORAL at 22:54

## 2018-07-14 RX ADMIN — IBUPROFEN 800 MG: 400 TABLET ORAL at 07:50

## 2018-07-14 RX ADMIN — SODIUM CHLORIDE, SODIUM LACTATE, POTASSIUM CHLORIDE, AND CALCIUM CHLORIDE 125 ML/HR: 600; 310; 30; 20 INJECTION, SOLUTION INTRAVENOUS at 00:52

## 2018-07-14 RX ADMIN — OXYCODONE HYDROCHLORIDE AND ACETAMINOPHEN 1 TABLET: 5; 325 TABLET ORAL at 13:39

## 2018-07-14 RX ADMIN — PENICILLIN G POTASSIUM 2.5 MILLION UNITS: 20000000 POWDER, FOR SOLUTION INTRAVENOUS at 01:18

## 2018-07-14 RX ADMIN — IBUPROFEN 800 MG: 400 TABLET ORAL at 21:02

## 2018-07-14 RX ADMIN — Medication 10000 MILLI-UNITS/HR: at 04:58

## 2018-07-14 RX ADMIN — OXYCODONE HYDROCHLORIDE AND ACETAMINOPHEN 1 TABLET: 5; 325 TABLET ORAL at 07:50

## 2018-07-14 RX ADMIN — SODIUM CHLORIDE, SODIUM LACTATE, POTASSIUM CHLORIDE, AND CALCIUM CHLORIDE 125 ML/HR: 600; 310; 30; 20 INJECTION, SOLUTION INTRAVENOUS at 03:19

## 2018-07-14 NOTE — ANESTHESIA PREPROCEDURE EVALUATION
Anesthetic History   No history of anesthetic complications            Review of Systems / Medical History  Patient summary reviewed and pertinent labs reviewed    Pulmonary  Within defined limits                 Neuro/Psych   Within defined limits           Cardiovascular  Within defined limits                Exercise tolerance: >4 METS     GI/Hepatic/Renal  Within defined limits              Endo/Other  Within defined limits           Other Findings   Comments: Documentation of current medication  Current medications obtained, documented and obtained? YES      Risk Factors for Postoperative nausea/vomiting:       History of postoperative nausea/vomiting? NO       Female? YES       Motion sickness? NO       Intended opioid administration for postoperative analgesia? YES      Smoking Abstinence:  Current Smoker? NO  Elective Surgery? YES  Seen preoperatively by anesthesiologist or proxy prior to day of surgery? YES  Pt abstained from smoking 24 hours prior to anesthesia?  N/A    Preventive care/screening for High Blood Pressure:  Aged 18 years and older: YES  Screened for high blood pressure: YES  Patients with high blood pressure referred to primary care provider   for BP management: YES                 Physical Exam    Airway  Mallampati: II  TM Distance: 4 - 6 cm  Neck ROM: normal range of motion   Mouth opening: Normal     Cardiovascular  Regular rate and rhythm,  S1 and S2 normal,  no murmur, click, rub, or gallop  Rhythm: regular  Rate: normal         Dental    Dentition: Lower dentition intact and Upper dentition intact     Pulmonary  Breath sounds clear to auscultation               Abdominal  GI exam deferred       Other Findings            Anesthetic Plan    ASA: 2  Anesthesia type: general          Induction: Intravenous  Anesthetic plan and risks discussed with: Patient

## 2018-07-14 NOTE — ANESTHESIA PROCEDURE NOTES
Epidural Block    Start time: 7/13/2018 10:10 PM  End time: 7/13/2018 10:40 PM  Performed by: Cristian Nicolas  Authorized by: Cristian Nicolas     Pre-Procedure  Indication: at surgeon's request and labor epidural    Preanesthetic Checklist: patient identified, risks and benefits discussed, anesthesia consent, site marked, patient being monitored, timeout performed and anesthesia consent    Timeout Time: 22:14        Epidural:   Patient position:  Seated  Prep region:  Lumbar  Prep: Betadine    Location:  L3-4    Needle and Epidural Catheter:   Needle Type:  Tuohy  Needle Gauge:  18 G  Injection Technique:  Loss of resistance using saline  Attempts:  2  Catheter Size:  20 G  Catheter at Skin Depth (cm):  13  Depth in Epidural Space (cm):  5  Events: no cerebrospinal fluid with aspiration and no paresthesia    Test Dose:  Positive and lidocaine 1.5% w/ epi    Assessment:   Catheter Secured:  Tegaderm  Insertion:  Complicated  + test dose 2219 with first placement. Catheter removed blue tip intact. Second placement one level above placed without incident.

## 2018-07-14 NOTE — PROGRESS NOTES
Called to evaluate pt with recently placed epidural for unresponsiveness. Instructed OB to turn off the epidural infusion, lay the pt flat, bolus ivf, and place the patient on O2. Upon arrival, the pt was interactive, breathing and hemodynamically stable. Baby was unaffected during the event. Epidural infusion was suspended for a while. Will re-eval.    Suspect systemic uptake though defect in blood vessel from first + test dose.      Margoth Chester MD

## 2018-07-14 NOTE — ROUTINE PROCESS
Patient Rounds    0915-Patient transferred onto floor, no complaints, no assistance needed  1040-Patient sleeping, no complaints, no assistance needed  1218-Patient lunch, no complaints, no assistance needed, family in room  1316-Patient resting, no complaints, no assistance needed   1444-Patient sleeping, no complaints, no assistance needed

## 2018-07-14 NOTE — PROGRESS NOTES
Labor Progress Note  Patient seen, fetal heart rate and contraction pattern evaluated, patient examined. Patient Vitals for the past 8 hrs:   BP Temp Pulse Resp SpO2   07/13/18 1933 112/62 97.5 °F (36.4 °C) - 17 98 %   07/13/18 1646 112/66 97.8 °F (36.6 °C) 84 18 -   07/13/18 1643 112/66 - 84 - -       Physical Exam:  Cervical Exam:  5 cm dilated    80% effaced    -2 station    Presenting Part: cephalic  Membranes:  Artificial Rupture of Membranes; Amniotic Fluid: medium amount of bloody fluid  Uterine Activity: Frequency: Every 4-9 minutes  Fetal Heart Rate: Baseline: 135 per minute  Variability: moderate  Accelerations: yes  Decelerations: none      Assessment/Plan:  Reassuring fetal status, Labor  Progressing normally, Continue plan for vaginal delivery  R/B/A of vaginal delivery or C/S discussed including infection, bleeding, blood transfusion, injury to internal organs, baby, operative vaginal delivery and shoulder dystocia with increased morbidity and mortality. All of her questions answered.     Signed By:  Alejandra Fernandez MD     July 13, 2018 10:02 PM

## 2018-07-14 NOTE — ANESTHESIA POSTPROCEDURE EVALUATION
Post-Anesthesia Evaluation and Assessment    Patient: Eric Chin MRN: [id number]  SSN: [id number]    YOB: 1980  Age: 41 y.o. Sex: female       Cardiovascular Function/Vital Signs  Visit Vitals    /72    Pulse 73    Temp 36.7 °C (98 °F)    Resp 20    SpO2 100%    Breastfeeding No       Patient is status post general anesthesia for * No procedures listed *. Nausea/Vomiting: None    Postoperative hydration reviewed and adequate. Pain:  Pain Scale 1: Numeric (0 - 10) (07/14/18 0729)  Pain Intensity 1: 7 (07/14/18 0729)   Managed    Neurological Status: At baseline    Mental Status and Level of Consciousness: Alert and oriented     Pulmonary Status:   O2 Device: Room air (07/14/18 0729)   Adequate oxygenation and airway patent    Complications related to anesthesia: I believe there was a brief period of LA systemic uptake during labor. Pt remembers emerging from a hazy period and then progressed with Labor and Delivery without incident. Pt ambulating and voiding without difficulty. All questions answered and pt is resting comfortably. Post-anesthesia assessment completed.  No concerns    Signed By: Vicky Larsen MD     July 14, 2018

## 2018-07-14 NOTE — OP NOTES
Obstetrician: Dr Patel Herbert MD PGY3    Pre-Delivery Diagnosis: Term pregnancy @ 38weeks    Post-Delivery Diagnosis: Living  infant, Male    Procedure: Spontaneous vaginal delivery    Epidural: YES    Monitor:  Fetal Heart Tones - External and Uterine Contractions - External    Indications for instrumental delivery: none    Estimated Blood Loss: 300 mL    Episiotomy: none    Laceration(s):  none    Presentation: Cephalic    Fetal Description: strauss    Birth Weight: 6 lbs 10 oz    Apgar - One Minute: 8    Apgar - Five Minutes: 9    Umbilical Cord: Nuchal Cord x  1    Specimens: placenta           Complications:  none      Idalia Mathews MD PGY3  [facility]    Present and scrubbed for the delivery with Dr. Negin Davidson.

## 2018-07-14 NOTE — PROGRESS NOTES
0930 TRANSFER - IN REPORT:    Verbal report received from Suad Bauman RN(name) on Ted Smith  being received from Labor and delivery(unit) for routine progression of care      Report consisted of patients Situation, Background, Assessment and   Recommendations(SBAR). Information from the following report(s) SBAR, Intake/Output, MAR and Recent Results was reviewed with the receiving nurse. Opportunity for questions and clarification was provided. 8326 Shift assessment complete. Pt resting in bed, no c/o pain.

## 2018-07-14 NOTE — PROGRESS NOTES
Assumed care of patient in bed no distress noted or voiced assisted to bathroom and instructed how and when to perform pericare.     0930 Verbal bedside shift report done Shira Hernandez RN report done from [facility] and [facility]

## 2018-07-15 LAB
HCT VFR BLD AUTO: 27.2 % (ref 35–45)
HGB BLD-MCNC: 9.2 G/DL (ref 12–16)

## 2018-07-15 PROCEDURE — 74011250637 HC RX REV CODE- 250/637: Performed by: OBSTETRICS & GYNECOLOGY

## 2018-07-15 PROCEDURE — 65270000029 HC RM PRIVATE

## 2018-07-15 PROCEDURE — 85018 HEMOGLOBIN: CPT | Performed by: OBSTETRICS & GYNECOLOGY

## 2018-07-15 PROCEDURE — 85014 HEMATOCRIT: CPT | Performed by: OBSTETRICS & GYNECOLOGY

## 2018-07-15 RX ORDER — OXYCODONE AND ACETAMINOPHEN 5; 325 MG/1; MG/1
1 TABLET ORAL
Qty: 10 TAB | Refills: 0 | Status: SHIPPED | OUTPATIENT
Start: 2018-07-15 | End: 2019-02-19

## 2018-07-15 RX ORDER — LANOLIN ALCOHOL/MO/W.PET/CERES
325 CREAM (GRAM) TOPICAL 2 TIMES DAILY WITH MEALS
Qty: 100 TAB | Refills: 1 | Status: SHIPPED | OUTPATIENT
Start: 2018-07-15 | End: 2019-02-19

## 2018-07-15 RX ORDER — ASCORBIC ACID 250 MG
250 TABLET ORAL 2 TIMES DAILY
Status: DISCONTINUED | OUTPATIENT
Start: 2018-07-15 | End: 2018-07-16 | Stop reason: HOSPADM

## 2018-07-15 RX ORDER — IBUPROFEN 800 MG/1
800 TABLET ORAL
Qty: 60 TAB | Refills: 0 | Status: SHIPPED | OUTPATIENT
Start: 2018-07-15 | End: 2019-02-19

## 2018-07-15 RX ORDER — ASCORBIC ACID 250 MG
250 TABLET ORAL 2 TIMES DAILY
Qty: 100 TAB | Refills: 1 | Status: SHIPPED | OUTPATIENT
Start: 2018-07-15 | End: 2019-02-19

## 2018-07-15 RX ORDER — LANOLIN ALCOHOL/MO/W.PET/CERES
1 CREAM (GRAM) TOPICAL 2 TIMES DAILY WITH MEALS
Status: DISCONTINUED | OUTPATIENT
Start: 2018-07-15 | End: 2018-07-16 | Stop reason: HOSPADM

## 2018-07-15 RX ADMIN — ASCORBIC ACID TAB 250 MG 250 MG: 250 TAB at 19:43

## 2018-07-15 RX ADMIN — FERROUS SULFATE TAB 325 MG (65 MG ELEMENTAL FE) 325 MG: 325 (65 FE) TAB at 08:08

## 2018-07-15 RX ADMIN — OXYCODONE HYDROCHLORIDE AND ACETAMINOPHEN 1 TABLET: 5; 325 TABLET ORAL at 08:08

## 2018-07-15 RX ADMIN — FERROUS SULFATE TAB 325 MG (65 MG ELEMENTAL FE) 325 MG: 325 (65 FE) TAB at 16:11

## 2018-07-15 RX ADMIN — OXYCODONE HYDROCHLORIDE AND ACETAMINOPHEN 1 TABLET: 5; 325 TABLET ORAL at 16:11

## 2018-07-15 RX ADMIN — IBUPROFEN 800 MG: 400 TABLET ORAL at 19:42

## 2018-07-15 RX ADMIN — ASCORBIC ACID TAB 250 MG 250 MG: 250 TAB at 08:08

## 2018-07-15 NOTE — PROGRESS NOTES
Post-Partum Day Number 1 Progress Note    @  Patient: Edna Hendricks MRN: [id number]  SSN: [id number]    YOB: 1980  Age: 41 y.o. Sex: female      Subjective:     PostPartum Day: 1     Delivery: vaginal delivery    The patient feels well. Pain is  well controlled with current medications. The baby is well. Baby is feeding via bottle. Voiding spontaneous. The patient is ambulating well. The patient is tolerating a normal diet. Lochia less than a period. Objective:      Patient Vitals for the past 8 hrs:   BP Temp Pulse Resp SpO2   07/15/18 0203 113/70 98 °F (36.7 °C) 61 18 99 %     General:    alert, cooperative, no distress   Fundus:   firm, FF at umbilicus   Extremities: No erythema, tenderness, edema   DVT Evaluation:  No evidence of DVT seen on physical exam.     Lab/Data Review:  No results found for this or any previous visit (from the past 24 hour(s)). Assessment:     Status post: Doing well postpartum vaginal delivery      Patient Active Problem List   Diagnosis Code    9 weeks gestation of pregnancy Z3A.09    Back pain M54.9    Pregnancy Z34.90       Plan:     1. Doing well, continue routine postpartum care. 2. Chronic anemia--> Iron and vit C, H/H this am P  3.  Desires D/C today    Signed By: Caroline Hayward MD     July 15, 2018 5:06 AM

## 2018-07-15 NOTE — PROGRESS NOTES
[id number]: Shift Summary Report    0700: Verbal shift change report given to REBA Carey (oncoming nurse) by SHAYAN Melendez (offgoing nurse). Report included the following information SBAR.     0710: Introduction to Pt. Discussed care plan. Pt verbalized understanding. Pt up in bed eating breakfast.    0800: Assessment completed. Pt stated pain 7/10. Pain medication given. Will continue to monitor and assess pt. Pt denies any additional need at this time    0845: Pt up in bed, holding baby and on the phone. Older daughter at bedside. Pt stated pain decreased down to a 2/10. Will continue to monitor and assess pt.    0900: Pt up in bed, getting ready to feed baby. Pt denies further need at this time    1020: Pt up walking around room. Caring for Baby. Stated feed Baby at 0930 @45ml. Changing infant at the moment. Pt denies further assistance or needs at this time. Pt denies pain    1115: Pt up in bed eating lunch, Older daughter on couch eating lunch,  daughter awake in crib. Pt denies further assistance or need at this time    1225: Pt up in bed holding Paxton. Older daughter resting on the couch. Pt denies need or assistance at this time    1330: Pt up in bed ate lunch with Family/Friends in the room    1430: Pt up in bed, holding Paxton, Older Daughter at bedside. Pt denies pain or further assistance at this time    1515: Pt up in bed, holding baby, Older daughter at bedside. 1610: Pt up walking around unit, Pt stated pain 7/10. Pain medication given. Will continue to monitor and assess pt    1715: Pt up in bed, visiting with Family/Friends in room, Baby resting in crib at mothers bedside. Pt denies any pain or further assistance needed at this time    1815: Pt up in bed with baby in lap, older daughter at bedside. Pt denies pain and further assistance needed at this time    1920: Verbal shift change report given to ESTHER Orozco (oncoming nurse) by REBA Carey (offgoing nurse).  Report included the following information SBAR.

## 2018-07-15 NOTE — ROUTINE PROCESS
Bedside shift change report given to NIKITA Andrade (oncoming nurse) by Yohannes Wade (offgoing nurse). Report included the following information SBAR, MAR and Recent Results.

## 2018-07-16 ENCOUNTER — HOSPITAL ENCOUNTER (EMERGENCY)
Age: 38
Discharge: HOME | End: 2018-07-16
Attending: EMERGENCY MEDICINE
Payer: MEDICAID

## 2018-07-16 ENCOUNTER — APPOINTMENT (OUTPATIENT)
Dept: GENERAL RADIOLOGY | Age: 38
End: 2018-07-16
Attending: EMERGENCY MEDICINE
Payer: MEDICAID

## 2018-07-16 ENCOUNTER — APPOINTMENT (OUTPATIENT)
Dept: CT IMAGING | Age: 38
End: 2018-07-16
Attending: STUDENT IN AN ORGANIZED HEALTH CARE EDUCATION/TRAINING PROGRAM
Payer: MEDICAID

## 2018-07-16 VITALS
HEIGHT: 68 IN | SYSTOLIC BLOOD PRESSURE: 109 MMHG | RESPIRATION RATE: 16 BRPM | DIASTOLIC BLOOD PRESSURE: 70 MMHG | BODY MASS INDEX: 27.28 KG/M2 | HEART RATE: 74 BPM | OXYGEN SATURATION: 99 % | WEIGHT: 180 LBS | TEMPERATURE: 98.4 F

## 2018-07-16 VITALS
TEMPERATURE: 97.9 F | RESPIRATION RATE: 18 BRPM | DIASTOLIC BLOOD PRESSURE: 98 MMHG | SYSTOLIC BLOOD PRESSURE: 166 MMHG | OXYGEN SATURATION: 99 % | HEART RATE: 68 BPM

## 2018-07-16 DIAGNOSIS — I30.0 ACUTE IDIOPATHIC PERICARDITIS: Primary | ICD-10-CM

## 2018-07-16 LAB
ANION GAP SERPL CALC-SCNC: 10 MMOL/L (ref 3–18)
BASOPHILS # BLD: 0 K/UL (ref 0–0.1)
BASOPHILS NFR BLD: 0 % (ref 0–2)
BUN SERPL-MCNC: 5 MG/DL (ref 7–18)
BUN/CREAT SERPL: 6 (ref 12–20)
CALCIUM SERPL-MCNC: 8.8 MG/DL (ref 8.5–10.1)
CHLORIDE SERPL-SCNC: 107 MMOL/L (ref 100–108)
CK MB CFR SERPL CALC: 0.5 % (ref 0–4)
CK MB SERPL-MCNC: 4.1 NG/ML (ref 5–25)
CK SERPL-CCNC: 867 U/L (ref 26–192)
CO2 SERPL-SCNC: 24 MMOL/L (ref 21–32)
CREAT SERPL-MCNC: 0.79 MG/DL (ref 0.6–1.3)
DIFFERENTIAL METHOD BLD: ABNORMAL
EOSINOPHIL # BLD: 0.4 K/UL (ref 0–0.4)
EOSINOPHIL NFR BLD: 3 % (ref 0–5)
ERYTHROCYTE [DISTWIDTH] IN BLOOD BY AUTOMATED COUNT: 13.3 % (ref 11.6–14.5)
GLUCOSE SERPL-MCNC: 81 MG/DL (ref 74–99)
HCT VFR BLD AUTO: 31.1 % (ref 35–45)
HGB BLD-MCNC: 10.7 G/DL (ref 12–16)
LYMPHOCYTES # BLD: 4.6 K/UL (ref 0.9–3.6)
LYMPHOCYTES NFR BLD: 44 % (ref 21–52)
MCH RBC QN AUTO: 28.4 PG (ref 24–34)
MCHC RBC AUTO-ENTMCNC: 34.4 G/DL (ref 31–37)
MCV RBC AUTO: 82.5 FL (ref 74–97)
MONOCYTES # BLD: 0.6 K/UL (ref 0.05–1.2)
MONOCYTES NFR BLD: 5 % (ref 3–10)
NEUTS SEG # BLD: 5 K/UL (ref 1.8–8)
NEUTS SEG NFR BLD: 48 % (ref 40–73)
PLATELET # BLD AUTO: 323 K/UL (ref 135–420)
PMV BLD AUTO: 10.2 FL (ref 9.2–11.8)
POTASSIUM SERPL-SCNC: 3.1 MMOL/L (ref 3.5–5.5)
RBC # BLD AUTO: 3.77 M/UL (ref 4.2–5.3)
SODIUM SERPL-SCNC: 141 MMOL/L (ref 136–145)
TROPONIN I SERPL-MCNC: <0.02 NG/ML (ref 0–0.04)
WBC # BLD AUTO: 10.5 K/UL (ref 4.6–13.2)

## 2018-07-16 PROCEDURE — 96374 THER/PROPH/DIAG INJ IV PUSH: CPT

## 2018-07-16 PROCEDURE — 74011250636 HC RX REV CODE- 250/636: Performed by: STUDENT IN AN ORGANIZED HEALTH CARE EDUCATION/TRAINING PROGRAM

## 2018-07-16 PROCEDURE — 74011636320 HC RX REV CODE- 636/320: Performed by: EMERGENCY MEDICINE

## 2018-07-16 PROCEDURE — 82550 ASSAY OF CK (CPK): CPT | Performed by: EMERGENCY MEDICINE

## 2018-07-16 PROCEDURE — 74011250637 HC RX REV CODE- 250/637: Performed by: STUDENT IN AN ORGANIZED HEALTH CARE EDUCATION/TRAINING PROGRAM

## 2018-07-16 PROCEDURE — 74011250637 HC RX REV CODE- 250/637: Performed by: OBSTETRICS & GYNECOLOGY

## 2018-07-16 PROCEDURE — 93005 ELECTROCARDIOGRAM TRACING: CPT

## 2018-07-16 PROCEDURE — 96361 HYDRATE IV INFUSION ADD-ON: CPT

## 2018-07-16 PROCEDURE — 80048 BASIC METABOLIC PNL TOTAL CA: CPT | Performed by: EMERGENCY MEDICINE

## 2018-07-16 PROCEDURE — 71275 CT ANGIOGRAPHY CHEST: CPT

## 2018-07-16 PROCEDURE — 99285 EMERGENCY DEPT VISIT HI MDM: CPT

## 2018-07-16 PROCEDURE — 71045 X-RAY EXAM CHEST 1 VIEW: CPT

## 2018-07-16 PROCEDURE — 85025 COMPLETE CBC W/AUTO DIFF WBC: CPT | Performed by: EMERGENCY MEDICINE

## 2018-07-16 RX ORDER — TRAMADOL HYDROCHLORIDE 50 MG/1
50 TABLET ORAL
Status: COMPLETED | OUTPATIENT
Start: 2018-07-16 | End: 2018-07-16

## 2018-07-16 RX ORDER — POTASSIUM CHLORIDE 20 MEQ/1
40 TABLET, EXTENDED RELEASE ORAL
Status: COMPLETED | OUTPATIENT
Start: 2018-07-16 | End: 2018-07-16

## 2018-07-16 RX ORDER — KETOROLAC TROMETHAMINE 30 MG/ML
30 INJECTION, SOLUTION INTRAMUSCULAR; INTRAVENOUS
Status: COMPLETED | OUTPATIENT
Start: 2018-07-16 | End: 2018-07-16

## 2018-07-16 RX ADMIN — IOPAMIDOL 100 ML: 755 INJECTION, SOLUTION INTRAVENOUS at 20:40

## 2018-07-16 RX ADMIN — POTASSIUM CHLORIDE 40 MEQ: 20 TABLET, EXTENDED RELEASE ORAL at 18:45

## 2018-07-16 RX ADMIN — KETOROLAC TROMETHAMINE 30 MG: 30 INJECTION, SOLUTION INTRAMUSCULAR at 19:59

## 2018-07-16 RX ADMIN — ASCORBIC ACID TAB 250 MG 250 MG: 250 TAB at 08:10

## 2018-07-16 RX ADMIN — TRAMADOL HYDROCHLORIDE 50 MG: 50 TABLET, FILM COATED ORAL at 18:45

## 2018-07-16 RX ADMIN — SODIUM CHLORIDE 1000 ML: 900 INJECTION, SOLUTION INTRAVENOUS at 18:48

## 2018-07-16 RX ADMIN — FERROUS SULFATE TAB 325 MG (65 MG ELEMENTAL FE) 325 MG: 325 (65 FE) TAB at 08:10

## 2018-07-16 RX ADMIN — OXYCODONE HYDROCHLORIDE AND ACETAMINOPHEN 1 TABLET: 5; 325 TABLET ORAL at 00:01

## 2018-07-16 NOTE — ADT AUTH CERT NOTES
Patient Demographics        Patient Name [address] Sex  Address Phone       Sunny Nye [phone] Female 1980 [address] [place] [address] [phone] (Home)  [phone] (Mobile) *Preferred*           CSN:       [id number]           Admit Date: Admit Time Room Bed       2018  4:38 PM 2216 [[id number]] 01 [[id number]]           Attending Providers        Provider Pager From To       Milena Perez MD  18            ADM  VAG DEL     : 2018      Tao Poon is a male infant born on 2018 at 4:49 AM at [facility]. He weighed  2.99 kg and measured 19\" in length.       Information for the patient's mother:  Sunny Nye [[id number]]   39 y.o.     Information for the patient's mother:  Sunny Nye [[id number]]   [place]        Information for the patient's mother:  Sunny Nye [[id number]]   Gestational Age: 36w4d

## 2018-07-16 NOTE — PROGRESS NOTES
SHIFT SUMMARY NOTE [id number]:    0710: Verbal and bedside report received from offgoing RN, Shaheed Sanz, using SBAR, Kardex, and MAR. Patient denies need for pain meds at this time. 7121: Scheduled meds given, initial shift assessment completed. Patient denies need for pain meds. 0940: Feeding baby. EPDS completed=0.    1000: DC instructions reviewed with patient and she verbalizes understanding. Copy of DC instructions given to patient. Discussed DC meds and RX x 1 given. 1100: Baby DC instructions. 1114: DC with baby in stable condition.

## 2018-07-16 NOTE — PROGRESS NOTES
1915-Verbal and bedside report received from offgoing RNClinton RN, using SBAR, Kardex, and MAR.  0715-Verbal and bedside report given to oncoming RN,[facility] RN, using SBAR, Kardex, and MAR.

## 2018-07-16 NOTE — ED PROVIDER NOTES
EMERGENCY DEPARTMENT HISTORY AND PHYSICAL EXAM    4:49 PM      Date: 7/16/2018  Patient Name: Georgina Jain    History of Presenting Illness     Chief Complaint   Patient presents with    Chest Pain    Shortness of Breath         History Provided By: Pt    Chief Complaint: CP  Duration: Hours, \"earlier today\"  Timing:  Acute  Location: chest  Quality: Pressure, \"like someone was sitting on her chest\"  Severity: Moderate  Modifying Factors: worse with lying down, unchanged by walking, better when sitting, unchanged by lying on side  Associated Symptoms: SOB, bilat UE tremors, edema in ankle, diaphoresis, HA      Additional History (Context): 4:57 PM Georgina Jain is a 41 y.o. female with h/o syphilis affecting pregnancy who presents to ED via EMS complaining of acute moderate pressure chest pain onset \"earlier today\". Vaginally delivered 6th child with no complications two days ago and stayed in hospital. Since that time, she has had bilat UE tremors as a side effect of the epidural. Claims she arrived home today, lied down and her CP and SOB began suddenly this afternoon. Pt describes it \"like someone was sitting on my chest.\" Sx worsened by lying down, unchanged by walking or lying on side, and improved by sitting and leaning forward. Associated Sx include SOB and mild diaphoresis. Patient states she has had similar sxs after the birth of her 5th child and was taken to the hospital and was given a \"fluid pill\" to get rid of the \"fluid that built up around my heart. \" Pt endorses some mild bilateral LE swelling since giving birth. Denies nausea and abd pain. Pt also reports minimal vaginal bleeding after giving birth. Denies any further complaints or symptoms at the moment.      PCP: Sunny Lakhani MD      Current Facility-Administered Medications   Medication Dose Route Frequency Provider Last Rate Last Dose    sodium chloride 0.9 % bolus infusion 1,000 mL  1,000 mL IntraVENous ONCE Seun RODRÍGUEZ Sherri Trotter MD        potassium chloride (K-DUR, KLOR-CON) SR tablet 40 mEq  40 mEq Oral NOW Mac Yee MD        traMADol [place]) tablet 50 mg  50 mg Oral NOW Mac Yee MD         Current Outpatient Prescriptions   Medication Sig Dispense Refill    ascorbic acid, vitamin C, (VITAMIN C) 250 mg tablet Take 1 Tab by mouth two (2) times a day. 100 Tab 1    ferrous sulfate 325 mg (65 mg iron) tablet Take 1 Tab by mouth two (2) times daily (with meals). 100 Tab 1    ibuprofen (MOTRIN) 800 mg tablet Take 1 Tab by mouth every eight (8) hours as needed. 60 Tab 0    oxyCODONE-acetaminophen (PERCOCET) 5-325 mg per tablet Take 1 Tab by mouth every four (4) hours as needed. Max Daily Amount: 6 Tabs. 10 Tab 0    diphenhydrAMINE (BENADRYL) 25 mg capsule Take 25 mg by mouth every six (6) hours as needed.  acetaminophen (TYLENOL) 325 mg tablet Take 2 Tabs by mouth every four (4) hours as needed for Pain. 20 Tab 0    prenatal vit-calcium-iron-fa (PRENATAL PLUS WITH CALCIUM) 27 mg iron- 1 mg tab Take 1 Tab by mouth daily. 61 Tab 2       Past History     Past Medical History:  Past Medical History:   Diagnosis Date    Syphilis affecting pregnancy        Past Surgical History:  Past Surgical History:   Procedure Laterality Date    HX DILATION AND CURETTAGE      For TABx2       Family History:  Family History   Problem Relation Age of Onset    Hypertension Maternal Grandmother     Cancer Maternal Grandmother      breast cancer, also great grandmother and great aunts    Diabetes Sister 27    Hypertension Sister 34       Social History:  Social History   Substance Use Topics    Smoking status: Never Smoker    Smokeless tobacco: Never Used    Alcohol use No      Comment: very rare       Allergies:  No Known Allergies      Review of Systems     Review of Systems   Constitutional: Positive for diaphoresis. Negative for fever. HENT: Negative for congestion and sore throat.     Eyes: Negative for pain and itching. Respiratory: Positive for shortness of breath. Negative for cough. Cardiovascular: Positive for chest pain. Negative for palpitations. \"pressure\"   Gastrointestinal: Negative for abdominal pain, diarrhea and nausea. Endocrine: Negative for polydipsia and polyuria. Genitourinary: Negative for dysuria and hematuria. Musculoskeletal: Negative for arthralgias and myalgias. Skin: Negative for rash and wound. Neurological: Positive for tremors (bilat upper extremities) and headaches. Negative for seizures and syncope. Hematological: Does not bruise/bleed easily. Psychiatric/Behavioral: Negative for agitation and hallucinations. Physical Exam     Visit Vitals    BP (!) 151/97    Pulse 61    Temp 97.9 °F (36.6 °C)    Resp 12    LMP 10/06/2017 (Approximate)  Comment: pt stated period was very light and not entirely sure of start date    SpO2 100%       Physical Exam   Constitutional: No distress. HENT:   Head: Atraumatic. Mouth/Throat: Oropharynx is clear and moist.   Eyes: Conjunctivae and EOM are normal. Pupils are equal, round, and reactive to light. Neck: Normal range of motion. Neck supple. Cardiovascular: Normal rate and normal heart sounds. No murmur heard. Pulmonary/Chest: Breath sounds normal. No respiratory distress. She has no wheezes. She has no rales. Abdominal: Soft. She exhibits no distension. There is no tenderness. Firm uterine fundus palpable below umbilicus   Musculoskeletal: Normal range of motion. She exhibits no edema or tenderness. Neurological: She is alert. Tremors: Bilateral upper extremities. No cranial nerve deficit. Coordination normal.   Mild bilateral tremor of upper extremities   Skin: Skin is warm and dry. No rash noted. She is not diaphoretic. Psychiatric: She has a normal mood and affect.  Her behavior is normal. Judgment and thought content normal.         Diagnostic Study Results   Labs -  Recent Results (from the past 12 hour(s))   CBC WITH AUTOMATED DIFF    Collection Time: 07/16/18  5:12 PM   Result Value Ref Range    WBC 10.5 4.6 - 13.2 K/uL    RBC 3.77 (L) 4.20 - 5.30 M/uL    HGB 10.7 (L) 12.0 - 16.0 g/dL    HCT 31.1 (L) 35.0 - 45.0 %    MCV 82.5 74.0 - 97.0 FL    MCH 28.4 24.0 - 34.0 PG    MCHC 34.4 31.0 - 37.0 g/dL    RDW 13.3 11.6 - 14.5 %    PLATELET 878 201 - 159 K/uL    MPV 10.2 9.2 - 11.8 FL    NEUTROPHILS 48 40 - 73 %    LYMPHOCYTES 44 21 - 52 %    MONOCYTES 5 3 - 10 %    EOSINOPHILS 3 0 - 5 %    BASOPHILS 0 0 - 2 %    ABS. NEUTROPHILS 5.0 1.8 - 8.0 K/UL    ABS. LYMPHOCYTES 4.6 (H) 0.9 - 3.6 K/UL    ABS. MONOCYTES 0.6 0.05 - 1.2 K/UL    ABS. EOSINOPHILS 0.4 0.0 - 0.4 K/UL    ABS.  BASOPHILS 0.0 0.0 - 0.1 K/UL    DF AUTOMATED     METABOLIC PANEL, BASIC    Collection Time: 07/16/18  5:12 PM   Result Value Ref Range    Sodium 141 136 - 145 mmol/L    Potassium 3.1 (L) 3.5 - 5.5 mmol/L    Chloride 107 100 - 108 mmol/L    CO2 24 21 - 32 mmol/L    Anion gap 10 3.0 - 18 mmol/L    Glucose 81 74 - 99 mg/dL    BUN 5 (L) 7.0 - 18 MG/DL    Creatinine 0.79 0.6 - 1.3 MG/DL    BUN/Creatinine ratio 6 (L) 12 - 20      GFR est AA >60 >60 ml/min/1.73m2    GFR est non-AA >60 >60 ml/min/1.73m2    Calcium 8.8 8.5 - 10.1 MG/DL   CARDIAC PANEL,(CK, CKMB & TROPONIN)    Collection Time: 07/16/18  5:12 PM   Result Value Ref Range     (H) 26 - 192 U/L    CK - MB 4.1 (H) <3.6 ng/ml    CK-MB Index 0.5 0.0 - 4.0 %    Troponin-I, Qt. <0.02 0.0 - 0.045 NG/ML   EKG, 12 LEAD, INITIAL    Collection Time: 07/16/18  5:12 PM   Result Value Ref Range    Ventricular Rate 63 BPM    Atrial Rate 63 BPM    P-R Interval 254 ms    QRS Duration 84 ms    Q-T Interval 408 ms    QTC Calculation (Bezet) 417 ms    Calculated P Axis 26 degrees    Calculated R Axis 35 degrees    Calculated T Axis 18 degrees    Diagnosis       Sinus rhythm with 1st degree AV block  Otherwise normal ECG         Radiologic Studies -   XR CHEST PORT   Final Result      CTA CHEST W OR W WO CONT    (Results Pending)     Xr Chest Port    Result Date: 2018  EXAMINATION: Chest single view INDICATION: Chest pain, shortness of breath COMPARISON: 2010 FINDINGS: Single frontal view of the chest obtained. Underpenetration limits evaluation. No consolidation. Mediastinal silhouette and pulmonary vasculature unremarkable. No evidence of pneumothorax. No acute osseous findings. IMPRESSION: 1. No acute findings. Medications ordered:   Medications   sodium chloride 0.9 % bolus infusion 1,000 mL (not administered)   potassium chloride (K-DUR, KLOR-CON) SR tablet 40 mEq (not administered)   traMADol (ULTRAM) tablet 50 mg (not administered)         Medical Decision Making     I am the first provider for this patient. I reviewed the vital signs, available nursing notes, past medical history, past surgical history, family history and social history. Vital Signs-Reviewed the patient's vital signs. Pulse Oximetry Analysis - 99% on RA, normal    Cardiac Monitor:  Rate/Rhythm:  74 bpm    EKG: Interpreted by the EP. Time Interpreted: 17:12   Rate: 63 bpm   Rhythm: NSR   Interpretation: no acute process    Records Reviewed: Nursing Notes and Old Medical Records (Time of Review: 4:49 PM)    Initial Medical Decision Making and DDx:    Assessment:  41 y.o. Female PPD#3 s/p  who presents with sxs concerning for SOB in the context of risk factors and history. Will check labs, EKG, CXR, reassess. Differential Diagnosis:  Pleaural effusion, pulmonary edema, less likely Asthma, Bronchitis, Pneumonia, PTX, COPD, URI, CHF, ACS. Doubt PE in this patient, as she is not hypoxic and not tachypneic, however in the setting of peripartum status, cannot rule out. Will likely require further imaging if preliminary workup non-conclusive.      ED Course: Progress Notes, Reevaluation, and Consults:    6:12 PM  Labwork grossly unremarkable, troponin negative  CXR without pleural effusion, infiltrate, or other source of patient's symptoms. Will get CTa to r/out PE and give supplemental K+ for repletion (3.1)    6:30 PM  Pt now complaining of pain, will rx for pain control  CTa pending    7:36 PM  Pt still with pain, states the only way she can get comfortable is by leaning forward. No evidence of pericarditis on ECG. BSUS without pericaridal fluid, however pt did not tolerate leaning back well. Will rx for toradol while CTa pending. 9:37 PM  Negative CTa. On re-eval, pt significantly improved after IV toradol. As work-up was grossly negative for any serious pathology, discharge with outpatient follow-up seems reasonable at this time. The results and plan of care were discussed, all questions sought and answered. At time of d/c, pt appears well hydrated, and is in no acute distress, no apparent emergent issue at this time. Pt will f/u with PCP and return as needed with any new or worsening symptoms or other concerns (given strict return precautions and advised to return for any new or worsening Sx). The patient is stable for discharge at this time. Diagnosis     Clinical Impression:   1. Acute idiopathic pericarditis        Disposition: Home    Follow-up Information     Follow up With Details Comments Contact Info    Judy Acosta MD Schedule an appointment as soon as possible for a visit  [place] [address] [address] [facility]      [facility] EMERGENCY DEPT  As needed, If symptoms worsen [address] [place] [id number] [phone]           Patient's Medications   Start Taking    No medications on file   Continue Taking    ACETAMINOPHEN (TYLENOL) 325 MG TABLET    Take 2 Tabs by mouth every four (4) hours as needed for Pain. ASCORBIC ACID, VITAMIN C, (VITAMIN C) 250 MG TABLET    Take 1 Tab by mouth two (2) times a day. DIPHENHYDRAMINE (BENADRYL) 25 MG CAPSULE    Take 25 mg by mouth every six (6) hours as needed.     FERROUS SULFATE 325 MG (65 MG IRON) TABLET    Take 1 Tab by mouth two (2) times daily (with meals). IBUPROFEN (MOTRIN) 800 MG TABLET    Take 1 Tab by mouth every eight (8) hours as needed. OXYCODONE-ACETAMINOPHEN (PERCOCET) 5-325 MG PER TABLET    Take 1 Tab by mouth every four (4) hours as needed. Max Daily Amount: 6 Tabs. PRENATAL VIT-CALCIUM-IRON-FA (PRENATAL PLUS WITH CALCIUM) 27 MG IRON- 1 MG TAB    Take 1 Tab by mouth daily. These Medications have changed    No medications on file   Stop Taking    No medications on file     _______________________________    Attestations:  Pardeep Pay acting as a scribe for and in the presence of Harmony Chaudhari MD      July 16, 2018 at 4:49 PM       Provider Attestation:      I personally performed the services described in the documentation, reviewed the documentation, as recorded by the scribe in my presence, and it accurately and completely records my words and actions.  July 16, 2018 at 4:49 PM - Harmony Chaudhari MD    _______________________________

## 2018-07-16 NOTE — DISCHARGE INSTRUCTIONS
· very few days. But, stools should not be rock hard. I  Discharge Instructions: Vaginal Delivery    Signs of Illness:  CALL YOUR PROVIDER IF ANY OF THE FOLLOWING OCCUR  1. Temperature of 100.4 or above  2. Chills  3. Severe abdominal pain  4. Excessive vaginal bleeding (more than 1 pad/hour, or any bleeding like a heavy period over the next 2 weeks). 5. Large amount of clots  6. Unusual discharge or drainage  7. Discharge with foul odor  8. Pain or burning on urination  9. Painful, reddened, tender breasts  10: Pain/swelling/redness in the calf of your legs. 11. Other symptoms you do not understand     Activity  1. Rest when your baby rests  2. 1-2 weeks after delivery, gradually increase your activity    General Concerns  1. Eat healthy, proper nutrition and adequate fluids are essential for healing, strength and energy. 2. Continue monthly self breast exams  3. No douching, tampons or intercourse for 6 weeks  4. No tub baths, pools, or hot tubs for 6 weeks      Follow-up  Call you provider on the day of discharge to schedule a follow-up appointment in 2 weeks. Call [phone] if you have any questions, and ask to speak with a nurse. DISCHARGE SUMMARY from Nurse    The following personal items collected during your admission are returned to you:   Dental Appliance: Dental Appliances: None  Vision: Visual Aid: None  Hearing Aid:    Jewelry: Jewelry: Necklace, Ring (rings x3)  Clothing: Clothing: At bedside, Footwear, Pants, Shirt, Undergarments  Other Valuables: Other Valuables: Cell Phone  Valuables sent to safe:                *  Please give a list of your current medications to your Primary Care Provider. *  Please update this list whenever your medications are discontinued, doses are      changed, or new medications (including over-the-counter products) are added. *  Please carry medication information at all times in case of emergency situations.       These are general instructions for a healthy lifestyle:    No smoking/ No tobacco products/ Avoid exposure to second hand smoke    Surgeon General's Warning:  Quitting smoking now greatly reduces serious risk to your health. Obesity, smoking, and sedentary lifestyle greatly increases your risk for illness    A healthy diet, regular physical exercise & weight monitoring are important for maintaining a healthy lifestyle    You may be retaining fluid if you have a history of heart failure or if you experience any of the following symptoms:  Weight gain of 3 pounds or more overnight or 5 pounds in a week, increased swelling in our hands or feet or shortness of breath while lying flat in bed. Please call your doctor as soon as you notice any of these symptoms; do not wait until your next office visit. Recognize signs and symptoms of STROKE:    F-face looks uneven    A-arms unable to move or move unevenly    S-speech slurred or non-existent    T-time-call 911 as soon as signs and symptoms begin-DO NOT go       Back to bed or wait to see if you get better-TIME IS BRAIN. The discharge information has been reviewed with the patient. The patient verbalized understanding. Patient armband removed and shredded    MyChart Activation    Thank you for requesting access to [employer]. Please follow the instructions below to securely access and download your online medical record. [employer] allows you to send messages to your doctor, view your test results, renew your prescriptions, schedule appointments, and more. How Do I Sign Up? 1. In your internet browser, go to [email or web address]. 2. Click on the First Time User? Click Here link in the Sign In box. You will see the New Member Sign Up page. 3. Enter your [employer] Access Code exactly as it appears below. You will not need to use this code after youve completed the sign-up process.  If you do not sign up before the expiration date, you must request a new code.    [employer] Access Code: [email or web address]  Expires: 2018 10:37 AM (This is the date your [employer] access code will )    4. Enter the last four digits of your Social Security Number (xxxx) and Date of Birth (mm/dd/yyyy) as indicated and click Submit. You will be taken to the next sign-up page. 5. Create a [employer] ID. This will be your [employer] login ID and cannot be changed, so think of one that is secure and easy to remember. 6. Create a [employer] password. You can change your password at any time. 7. Enter your Password Reset Question and Answer. This can be used at a later time if you forget your password. 8. Enter your e-mail address. You will receive e-mail notification when new information is available in [address] 9. Click Sign Up. You can now view and download portions of your medical record. 10. Click the Download Summary menu link to download a portable copy of your medical information. Additional Information    If you have questions, please visit the Frequently Asked Questions section of the [employer] website at [email or web address] Remember, [employer] is NOT to be used for urgent needs. For medical emergencies, dial 911. After Your Delivery (the Postpartum Period): After Your Visit  Your Care Instructions  Congratulations on the birth of your baby. Like pregnancy, the  period can be a time of excitement, yuri, and exhaustion. You may look at your wondrous little baby and feel happy. You may also be overwhelmed by your new sleep hours and new responsibilities. At first, babies often sleep during the days and are awake at night. They do not have a pattern or routine. They may make sudden gasps, jerk themselves awake, or look like they have crossed eyes. These are all normal, and they may even make you smile. In these first weeks after delivery, try to take good care of yourself.  It may take 4 to 6 weeks to feel like yourself again, and possibly longer if you had a  birth. You will likely feel very tired for several weeks. Your days will be full of ups and downs, but lots of yuri as well. Follow-up care is a key part of your treatment and safety. Be sure to make and go to all appointments, and call your doctor if you are having problems. Its also a good idea to know your test results and keep a list of the medicines you take. How can you care for yourself at home? Take care of your body after delivery  · Use pads instead of tampons for the bloody flow that may last as long as 2 weeks. · Ease cramps with acetaminophen (Tylenol). · Ease soreness of hemorrhoids and the area between your vagina and rectum with ice compresses or witch hazel pads. · Ease constipation by drinking lots of fluid and eating high-fiber foods. Ask your doctor about over-the-counter stool softeners. · Cleanse yourself with a gentle squeeze of warm water from a bottle instead of wiping with toilet paper. · Take a sitz bath in warm water several times a day. · Wear a good nursing bra. Ease sore and swollen breasts with warm, wet washcloths. · If you are not breast-feeding, use ice rather than heat for breast soreness. · Your period may not start for several months if you are breast-feeding. You may bleed more, and longer at first, than you did before you got pregnant. · Wait until you are healed (about 4 to 6 weeks) before you have sexual intercourse. Your doctor will tell you when it is okay to have sex. · Try not to travel with your baby for 5 or 6 weeks. If you take a long car trip, make frequent stops to walk around and stretch. Avoid exhaustion  · Rest every day. Try to nap when your baby naps. · Ask another adult to be with you for a few days after delivery. · Plan for  if you have other children. · Stay flexible so you can eat at odd hours and sleep when you need to. Both you and your baby are making new schedules.   · Plan small trips to get out of the house. Change can make you feel less tired. · Ask for help with housework, cooking, and shopping. Remind yourself that your job is to care for your baby. Know about help for postpartum depression  · \"Baby blues are common for the first 1 to 2 weeks after birth. You may cry or feel sad or irritable for no reason. · Rest whenever you can. Being tired makes it harder to handle your emotions. · Go for walks with your baby. · Talk to your partner, friends, and family about your feelings. · If your symptoms last for more than a few weeks, or if you feel very depressed, ask your doctor for help. · Postpartum depression can be treated. Support groups and counseling can help. Sometimes medicine can also help. Stay healthy  · Eat healthy foods so you have more energy, make good breast milk, and lose extra baby pounds. · If you breast-feed, avoid alcohol and drugs. Stay smoke-free. If you quit during pregnancy, congratulations. · Start daily exercise after 4 to 6 weeks, but rest when you feel tired. · Learn exercises to tone your belly. Do Kegel exercises to regain strength in your pelvic muscles. You can do these exercises while you stand or sit. ¨ Squeeze the same muscles you would use to stop your urine. Your belly and rear end (buttocks) should not move. ¨ Hold the squeeze for 3 seconds, then relax for 3 seconds. ¨ Repeat the exercise 10 to 15 times for each session. Do three or more sessions each day. · Find a class for new mothers and new babies that has an exercise time. · If you had a  birth, give yourself a bit more time before you exercise, and be careful. When should you call for help? Call 911 anytime you think you may need emergency care. For example, call if:  · You have sudden, severe pain in your belly. · You passed out (lost consciousness). Call your doctor now or seek immediate medical care if:  · You have severe vaginal bleeding.  You are passing blood clots and soaking through a pad each hour for 2 or more hours. · Your vaginal bleeding seems to be getting heavier or is still bright red 4 days after delivery, or you pass blood clots larger than the size of a golf ball. · You are dizzy or lightheaded, or you feel like you may faint. · You are vomiting or cannot keep fluids down. · You have a fever. · You have new or more belly pain. · You pass tissue (not just blood). · Your breast or breasts have hard, red, or tender areas. · You have an urgent or frequent need to urinate, along with a burning feeling. · You have severe pain, tenderness, or swelling in your vagina or the area between your rectum and vagina. · You have severe pains in your chest, belly, back, or legs. · You have feelings of severe despair or great anxiety. · Your baby is unusually cranky or is sleeping too much. · Your baby's eyes are red or have discharge. · Your baby has white patches on the roof and sides of the mouth or tongue. · Your baby's umbilical cord is foul-smelling, swollen, red, or leaking pus. · There is blood or mucus in your baby's bowel movements. · Your baby has fewer than 6 wet diapers a day. · Your baby does not want to eat, or your baby is throwing up with every feeding. · Your baby has trouble breathing. · Your baby has a rectal temperature of 100.4°F or more, or an underarm temperature over 99.4°F.  · You baby has a low temperature less than 97.5°F rectal, or less than 97. 0°F underarm. · Your baby's skin looks yellow. Watch closely for changes in your health, and be sure to contact your doctor if you have any problems. Where can you learn more? Go to in2apps.be  Enter A461 in the search box to learn more about \"After Your Delivery (the Postpartum Period): After Your Visit. \"   © 3606-4780 Healthwise, Incorporated. Care instructions adapted under license by [employer] (which disclaims liability or warranty for this information).  This care instruction is for use with your licensed healthcare professional. If you have questions about a medical condition or this instruction, always ask your healthcare professional. [place] [address] any warranty or liability for your use of this information. Content Version: 9.3.24015; Last Revised: July 8, 2010      Depression After Childbirth: After Your Visit  Your Care Instructions  Many women get the \"baby blues\" during the first few days after childbirth. They may lose sleep, feel irritable, cry easily, and feel happy one minute and sad the next. Hormone changes are one cause of these emotional changes. Also, the demands of a new baby, coupled with visits from relatives or other family needs, add to a mother's stress. The \"baby blues\" usually peak around the fourth day and then ease up in less than 2 weeks. If your moodiness or anxiety lasts for more than 2 weeks, or if you feel like life is not worth living, you may have postpartum depression. This is different for each mother. Some mothers with serious depression may worry intensely about their infant's well-being, while others may feel distant from their child. Some mothers might even feel that they might harm their baby. A mother may have signs of paranoia, wondering if someone is watching her. Depression is not a sign of weakness. It is a medical condition that requires treatment. Medicine and counseling are often effective at reducing depression. Talk to your doctor about taking antidepressant medicine while breast-feeding. Follow-up care is a key part of your treatment and safety. Be sure to make and go to all appointments, and call your doctor if you are having problems. Its also a good idea to know your test results and keep a list of the medicines you take. How can you care for yourself at home? · Take your medicines exactly as prescribed. Call your doctor if you think you are having a problem with your medicine.   · Eat a balanced diet, so that you can keep up your energy. · Get regular daily exercise, such as walks, to help improve your mood. · Get as much sunlight as possible. Keep your shades and curtains open, and get outside as much as you can. · Avoid using alcohol or other substances to feel better. · Get as much rest and sleep as possible, and avoid doing too much. Being too tired can increase depression. · Play stimulating music throughout your day and soothing music at night. · Schedule outings and visits with friends and family. Ask them to call you regularly, so that you do not feel alone. · Ask for help with preparing food and other daily tasks. Family and friends are often happy to help a mother with a . · Be honest with yourself and those who care about you. Tell them about your struggle. · Join a support group of new mothers. No one can better understand the challenges of caring for a  than other new mothers. When should you call for help? Call 911 anytime you think you may need emergency care. For example, call if:  · You feel you cannot stop from hurting yourself or someone else. Call your doctor now or seek immediate medical care if:  · You are having trouble caring for yourself or your baby. · You have signs of paranoia that can occur with postpartum depression. You fear that someone is watching you, stealing from you, or reading your mind. · You hear voices. · You have symptoms of postpartum depression, such as:  ¨ Sleeplessness. ¨ Anxiety. ¨ Hopelessness. ¨ Irritability. ¨ Poor concentration. · Someone you know has depression and:  ¨ Starts to give away his or her possessions. ¨ Uses illegal drugs or drinks alcohol heavily. ¨ Talks or writes about death, including writing suicide notes and talking about guns, knives, or pills. ¨ Starts to spend a lot of time alone. ¨ Acts very aggressively or suddenly appears calm.   Watch closely for changes in your health, and be sure to contact your doctor if you have any problems. Where can you learn more? Go to [employer].be  Enter E411 in the search box to learn more about \"Depression After Childbirth: After Your Visit. \"   © 4933-0594 Healthwise, Incorporated. Care instructions adapted under license by Gabby Mahan (which disclaims liability or warranty for this information). This care instruction is for use with your licensed healthcare professional. If you have questions about a medical condition or this instruction, always ask your healthcare professional. [address] any warranty or liability for your use of this information. Content Version: 9.3.57707; Last Revised: April 18, 2011    Breast Self-Exam: After Your Visit  Your Care Instructions  A breast self-exam is when you check your breasts for lumps or changes. This regular exam helps you learn how your breasts normally look and feel. Most breast problems or changes are not because of cancer. Breast self-exam is not a substitute for a mammogram. Having regular breast exams by your doctor and regular mammograms improve your chances of finding any problems with your breasts. Some women set a time each month to do a step-by-step breast self-exam. Other women like a less formal system. They might look at their breasts as they brush their teeth, or feel their breasts once in a while in the shower. If you notice a change in your breast, tell your doctor. Follow-up care is a key part of your treatment and safety. Be sure to make and go to all appointments, and call your doctor if you are having problems. Its also a good idea to know your test results and keep a list of the medicines you take. How do you do a breast self-exam?  · The best time to examine your breasts is usually one week after your menstrual period begins. Your breasts should not be tender then.  If you do not have periods, you might do your exam on a day of the month that is easy to remember. · To examine your breasts:  ¨ Remove all your clothes above the waist and lie down. When you are lying down, your breast tissue spreads evenly over your chest wall, which makes it easier to feel all your breast tissue. ¨ Use the pads--not the fingertips--of the 3 middle fingers of your left hand to check your right breast. Move your fingers slowly in small coin-sized circles that overlap. ¨ Use three levels of pressure to feel of all your breast tissue. Use light pressure to feel the tissue close to the skin surface. Use medium pressure to feel a little deeper. Use firm pressure to feel your tissue close to your breastbone and ribs. Use each pressure level to feel your breast tissue before moving on to the next spot. ¨ Check your entire breast, moving up and down as if following a strip from the collarbone to the bra line, and from the armpit to the ribs. Repeat until you have covered the entire breast.  ¨ Repeat this procedure for your left breast, using the pads of the 3 middle fingers of your right hand. · To examine your breasts while in the shower:  ¨ Place one arm over your head and lightly soap your breast on that side. ¨ Using the pads of your fingers, gently move your hand over your breast (in the strip pattern described above), feeling carefully for any lumps or changes. ¨ Repeat for the other breast.  · Have your doctor inspect anything you notice to see if you need further testing. Where can you learn more? Go to Schoolwires.be  Enter P148 in the search box to learn more about \"Breast Self-Exam: After Your Visit. \"   © 7049-4407 Healthwise, Incorporated. Care instructions adapted under license by Verna Leach (which disclaims liability or warranty for this information).  This care instruction is for use with your licensed healthcare professional. If you have questions about a medical condition or this instruction, always ask your healthcare professional. Healthwise, Incorporated disclaims any warranty or liability for your use of this information. Content Version: 9.3.72807; Last Revised: 2011   Bottle-Feeding: After Your Visit  Your Care Instructions  Your reasons for wanting to bottle-feed your baby with formula are personal. You and your partner can make the best decision for you and your baby. Formulas can provide all the calories and nutrients your baby needs in the first 6 months of life. Several types of formulas are available. Most babies start with a cow's milk-based formula, such as Enfamil, Good Start, or Similac. Talk to your doctor before trying other types of formulas, which include soy and lactose-free formulas. At first, preparing the bottles and formula can seem confusing, but it gets easier and faster with some practice. Your  baby probably will want to eat every 2 to 3 hours. Do not worry about the exact timing for the first few weeks, but feed your baby whenever he or she is hungry. In general, your baby should not go longer than 4 hours without eating during the day for the first few months. Sit in a comfortable chair with your arms supported on pillows. Look into your baby's eyes and talk or sing while you are giving the bottle. Enjoy this special time you have with your baby. Follow-up care is a key part of your childs treatment and safety. Be sure to make and go to all appointments, and call your doctor if your child is having problems. Its also a good idea to know your childs test results and keep a list of the medicines your child takes. At each well-baby visit, talk to your doctor about your baby's nutritional needs, which change as he or she grows and develops. How can you care for yourself at home? · Prepare your supplies for bottle-feeding before your baby is born, if possible. ¨ Have a supply of small bottles (usually 4 ounces) for your baby's first few weeks.   ¨ You may want to buy a variety of bottle nipples so you can see which type your baby likes. ¨ Before using bottles and nipples the first time, wash them in hot water and dish soap and rinse with hot water. · Ask your doctor which formula to use. You can buy formula as a liquid concentrate or a powder that you mix with water. Formulas also come in a ready-to-feed form. Always use formula with added iron unless the doctor says not to. · Make sure you have clean, safe water to mix with the formula. Boil water--even bottled water--for 1 to 2 minutes, and let it cool before mixing it with formula. · Wash your hands before preparing formula. · Read the label to see how much water to mix with the formula. If you add too little water, it can upset your baby's stomach. If you add too much water, your baby will not get the right nutrition. · Cover the prepared formula and store it in a refrigerator. Use it within 24 hours. · Soak dirty baby bottles in water and dish soap. Wash bottles and nipples in the upper rack of the  or hand-wash them in hot water with dish soap. To bottle-feed your baby  · Warm the formula to room temperature or body temperature before feeding. The best way to warm it is in a pan of heated water. Do not use a microwave, which can cause hot spots in the formula that can burn your baby's mouth. · Before feeding your baby, check the temperature of the formula by dripping 2 or 3 drops on the inside of your wrist. It should be warm, not cold or hot. · Place a bib or cloth under your baby's chin to help keep clothes clean. Have a second cloth handy to use when burping your baby. · Support your baby with one arm, with your baby's head resting in the bend of your elbow. Keep your baby's head higher than his or her chest.  · Stroke the center of your baby's lower lip to encourage the mouth to open wider. A wide mouth will cover more of the nipple and will help reduce the amount of air the baby sucks in.   · Angle the bottle so the neck of the bottle and the nipple stay full of milk. This helps reduce how much air your baby swallows. · Do not prop the bottle in your baby's mouth or let him or her hold it alone. This increases your baby's chances of choking or getting ear infections. · During the first few weeks, burp your baby after every 2 ounces of formula. This helps get rid of swallowed air and reduces spitting up. · You will know your baby is full when he or she stops sucking. Your baby may spit out the nipple, turn his or her head away, or fall asleep when full. [place] babies usually drink from 1 to 3 ounces each feeding. · Throw away any formula left in the bottle after you have fed your baby. Bacteria can grow in the leftover formula. · It can be helpful to hold your baby upright for about 30 minutes after eating to reduce spitting up. When should you call for help? Watch closely for changes in your child's health, and be sure to contact your doctor if:  · Your child does not seem to be growing and gaining weight. · Your child has trouble passing stools, or his or her stools are hard and dry. · Your child is vomiting. · Your child has diarrhea or a skin rash. · Your child cries most of the time. · Your child has gas, bloating, or cramps after drinking a bottle. Where can you learn more? Go to Cvgram.me.be  Enter P111 in the search box to learn more about \"Bottle-Feeding: After Your Visit. \"   © 4883-4608 [employer], [employer]. Care instructions adapted under license by [employer] (which disclaims liability or warranty for this information). This care instruction is for use with your licensed healthcare professional. If you have questions about a medical condition or this instruction, always ask your healthcare professional. [place] [address] any warranty or liability for your use of this information.   Content Version: 9.3.03878; Last Revised: 2012
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
## 2018-07-16 NOTE — PROGRESS NOTES
Problem: Falls - Risk of  Goal: *Absence of Falls  Document Deonna Fall Risk and appropriate interventions in the flowsheet.    Fall Risk Interventions:            Medication Interventions: Teach patient to arise slowly

## 2018-07-16 NOTE — PROGRESS NOTES
Problem: Falls - Risk of  Goal: *Absence of Falls  Document Deonna Fall Risk and appropriate interventions in the flowsheet.    Outcome: Progressing Towards Goal  Fall Risk Interventions:            Medication Interventions: Teach patient to arise slowly

## 2018-07-17 NOTE — DISCHARGE INSTRUCTIONS
Pericarditis: Care Instructions  Your Care Instructions    Pericarditis occurs when the membrane that surrounds the heart and its major blood vessels becomes inflamed. In most cases, the cause is not known. It can be caused by a virus, a heart attack, or a chest injury. It also can be caused by another type of illness. Pericarditis causes sharp chest pain. This pain gets worse when you lie down or take a deep breath. The pain gets better if you lean forward or sit up. Pericarditis often heals on its own. It usually does not cause any more problems. Most people get better within a couple of weeks. Follow-up care is a key part of your treatment and safety. Be sure to make and go to all appointments, and call your doctor if you are having problems. It's also a good idea to know your test results and keep a list of the medicines you take. How can you care for yourself at home? · Watch for the return of your symptoms. Sometimes pericarditis can come back after it has gone away. · Be safe with medicines. Take your medicines exactly as prescribed. Call your doctor if you think you are having a problem with your medicine. · Ask your doctor if you can take an over-the-counter pain medicine, such as acetaminophen (Tylenol), ibuprofen (Advil, Motrin), or naproxen (Aleve). Read and follow all instructions on the label. · Do not take two or more pain medicines at the same time unless the doctor told you to. Many pain medicines have acetaminophen, which is Tylenol. Too much acetaminophen (Tylenol) can be harmful. · Get plenty of rest until you feel better, especially if you have a fever. · Avoid exercise and strenuous activity that has not been approved by your doctor. Ask your doctor when you can be active again. When should you call for help? Call 911 anytime you think you may need emergency care.  For example, call if:    · You have severe trouble breathing.     · You passed out (lost consciousness).    Call your doctor now or seek immediate medical care if:    · You are dizzy or lightheaded, or you feel like you may faint.     · You have trouble breathing.     · You have a new or higher fever.    Watch closely for changes in your health, and be sure to contact your doctor if:    · You do not get better as expected. Where can you learn more? Go to http://inocencia-hank.info/. Enter 822 2958 in the search box to learn more about \"Pericarditis: Care Instructions. \"  Current as of: May 10, 2017  Content Version: 11.7  © 6270-1297 [employer]. Care instructions adapted under license by [employer] (which disclaims liability or warranty for this information). If you have questions about a medical condition or this instruction, always ask your healthcare professional. [address] any warranty or liability for your use of this information.

## 2018-07-18 LAB
ATRIAL RATE: 63 BPM
CALCULATED P AXIS, ECG09: 26 DEGREES
CALCULATED R AXIS, ECG10: 35 DEGREES
CALCULATED T AXIS, ECG11: 18 DEGREES
DIAGNOSIS, 93000: NORMAL
P-R INTERVAL, ECG05: 254 MS
Q-T INTERVAL, ECG07: 408 MS
QRS DURATION, ECG06: 84 MS
QTC CALCULATION (BEZET), ECG08: 417 MS
VENTRICULAR RATE, ECG03: 63 BPM

## 2018-07-18 NOTE — DISCHARGE SUMMARY
Obstetrical Discharge Summary     Name: Eric Chin MRN: [id number]  SSN: [id number]    YOB: 1980  Age: 41 y.o. Sex: female      Admit Date: 2018    Discharge Date: 2018    Admitting Physician: Ileana Albarran MD     Attending Physician:  No att. providers found     Discharge Diagnoses:   Information for the patient's :  Kimberly Lilly [[id number]]   Delivery of a 6 lb 9.5 oz (2.99 kg) male infant via Vaginal, Spontaneous Delivery on 2018 at 4:49 AM  by . Apgars were 8 and 9. Additional Diagnoses:   Problem List as of 2018  Date Reviewed: 2018          Codes Class Noted - Resolved    Pregnancy ICD-10-CM: Z34.90  ICD-9-CM: V22.2  2018 - Present        Back pain ICD-10-CM: M54.9  ICD-9-CM: 724.5  2018 - Present        9 weeks gestation of pregnancy ICD-10-CM: Z3A.09  ICD-9-CM: V22.2  2017 - Present              Lab Results   Component Value Date/Time    Rubella, External Immune  2017     Recent Labs      18   1712   HGB  10.7[facility] Course: Normal hospital course following the delivery. Patient Instructions:   Cannot display discharge medications since this patient is not currently admitted. No orders of the defined types were placed in this encounter.        Signed By:  Ileana Albarran MD     2018 10:22 AM                      BST

## 2018-09-02 NOTE — PROGRESS NOTES
In [address] [place] [address], [address] [place], [address] [place], [place] [address]y [address],[facility] 300 
[phone] fax Discharge Summary Patient name: Javed Fonseca Start of Care: 2018 Referral source: William Joe MD : 1980 Medical Diagnosis: Sciatica, unspecified side [M54.30] Onset Date:2.5 Weeks Treatment Diagnosis: Piriformis syndrome Right, Neural tension Right LE  
Prior Hospitalization: see medical history Provider#: [id number] Medications: Verified on Patient summary List  
 Comorbidities: Pregnant, OA Prior Level of Function: No back or right buttocks pain, Good balance Visits from Start of Care: 3    Missed Visits: 5 Reporting Period : 18 to 18 Summary of Care: Patient seen for initial 3 visits with a fair to good response to treatment program but failed to return for continued treatments and progress towards established goals. No further contact after 18. Progress towards goals / Updated goals: 1.  Pt will be compliant and independent with HEP in order to facilitate PT sessions and aid with self management 
                      Eval Status:  Initiated 
                      AMRTKQX Status:  Met 18 2.  Pt to tolerate 30 min or more of TE and/or Interventions w/o increased s/s                       Eval Status:  Initiated 
                      JWDKDTX Status: Met  18 
   
Long Term Goals: To be accomplished in 10 treatments: 1.  Pt will report 50% improvement or better with involvement to show a significant increase in function                       Eval Status:  Initiated 
                      SXYDWDD Status:  Not assessed, failed to return 2.  Pt will have decreased pain right glute region at 3/10 or better to aid in returning to light community ambulation   
                      Eval Status:  Initiated                       Current Status:  Progressing at 4/10 6/11/18 3.  Pt will demonstrate Hip flx strength at 4/5 to increase hip flexion with walking for improved balance and a normal gait pattern relative to pregnant state                       Eval Status:  Initiated 
                      NPLGKDM Status:  Not assessed, failed to return 4.  Pt will ambulate 500' with little to no difficulty for progress to increased functional tolerance with community ambulation and getting around her home   
                      Eval Status:  Initiated 
                      Current Status: Not assessed, failed to return 5.  Pt will perform sit-stand x 10 reps w/o difficulty and with pain at 3/10 or better to allow her to stand from the seated position w/o fear of pain or losing balance 
                      Eval Status: Moderate difficulty performing sit to stand                       Current Status: Not assessed, failed to return 6.  Pt will improve FOTO score to 64 in 11 visits to show significant improvement in function for progress to little difficulty walking around her house to perform light house work 
                      Eval Status: 33 
                      Current Status:  Not assessed, failed to return ASSESSMENT/RECOMMENDATIONS: 
[]Discontinue therapy progressing towards or have reached established goals []Discontinue therapy due to lack of appreciable progress towards goals [x]Discontinue therapy due to lack of attendance or compliance []Other: Thank you for this referral.  
 
Shane Martinez, PT 9/2/2018 1:45 PM 
 
NOTE TO PHYSICIAN:  Please complete the following and fax to: [facility] at [phone] Shelley Lopez Retain this original for your records. If you are unable to process this request in  
24 hours, please contact our office.   
 
[] I have read the above report and request that my patient continue therapy with the following changes/special instructions: 
[] I have read the above report and request that my patient be discharged from therapy [de-identified] Signature:____________Date:_________TIME:________ 
 
[facility], Date and Time must be completed for valid certification **

## 2018-11-28 NOTE — PROGRESS NOTES
Problem: Falls - Risk of  Goal: *Absence of Falls  Document Deonna Fall Risk and appropriate interventions in the flowsheet.    Outcome: Progressing Towards Goal  Fall Risk Interventions:            Medication Interventions: Teach patient to arise slowly Dorsal Nasal Flap Text: The defect edges were debeveled with a #15 scalpel blade.  Given the location of the defect and the proximity to free margins a dorsal nasal flap was deemed most appropriate.  Using a sterile surgical marker, an appropriate dorsal nasal flap was drawn around the defect.    The area thus outlined was incised deep to adipose tissue with a #15 scalpel blade.  The skin margins were undermined to an appropriate distance in all directions utilizing iris scissors.

## 2019-02-19 ENCOUNTER — HOSPITAL ENCOUNTER (EMERGENCY)
Age: 39
Discharge: LEFT BEFORE DISCHARGE | End: 2019-02-19
Attending: EMERGENCY MEDICINE
Payer: MEDICAID

## 2019-02-19 VITALS
TEMPERATURE: 99 F | DIASTOLIC BLOOD PRESSURE: 80 MMHG | SYSTOLIC BLOOD PRESSURE: 115 MMHG | HEART RATE: 71 BPM | OXYGEN SATURATION: 98 % | WEIGHT: 185 LBS | BODY MASS INDEX: 28.04 KG/M2 | HEIGHT: 68 IN | RESPIRATION RATE: 18 BRPM

## 2019-02-19 DIAGNOSIS — M54.2 NECK PAIN: Primary | ICD-10-CM

## 2019-02-19 DIAGNOSIS — Z53.20 PATIENT LEFT BEFORE TREATMENT COMPLETED: ICD-10-CM

## 2019-02-19 PROCEDURE — 99282 EMERGENCY DEPT VISIT SF MDM: CPT

## 2019-02-19 NOTE — ED TRIAGE NOTES
Patient presents with c/o feeling \"pop\" in bone in neck. States neck stiffness since feeling the pop this morning.

## 2019-02-19 NOTE — ED PROVIDER NOTES
EMERGENCY DEPARTMENT HISTORY AND PHYSICAL EXAM 
 
1:41 PM 
 
 
Date: 2/19/2019 Patient Name: Jossue Burks History of Presenting Illness Chief Complaint Patient presents with  Neck Pain History Provided By: Patient Chief Complaint: \"Neck pain\" Duration:  Hours Timing:  Acute Location: Upper thoracic spine and lower cervical spine Quality: Kenan Jennifers Severity: 10 out of 10 Modifying Factors: No pain when still (only stiffness, sharp 10/10 pain with all movement of the neck or upper back Associated Symptoms: denies any other associated signs or symptoms Additional History (Context):Carina Ken is a 41 y.o. female who presents to the emergency department for evaluation of neck pain which began upon awakening a few hours prior to presentation here. Pain is exacerbated by movement, but is less pronounced when remaining still. No recent injury/trauma. No history of similar. No associated HA, nausea, vomiting, fever, chills, CP, SOB, ENT issues, visual changes, speech difficulties, or radiculopathy. Pt did not attempt any treatments at home prior to presenting for evaluation here in the ED. Patient has no other complaints at this time. PCP:  To Copeland MD 
 
 
Past History Past Medical History: 
Past Medical History:  
Diagnosis Date  Syphilis affecting pregnancy Past Surgical History: 
Past Surgical History:  
Procedure Laterality Date  HX DILATION AND CURETTAGE For TABx2 Family History: 
Family History Problem Relation Age of Onset  Hypertension Maternal Grandmother  Cancer Maternal Grandmother   
     breast cancer, also great grandmother and great aunts  Diabetes Sister 27  Hypertension Sister 34 Social History: 
Social History Tobacco Use  Smoking status: Never Smoker  Smokeless tobacco: Never Used Substance Use Topics  Alcohol use: No  
  Comment: very rare  Drug use:  No  
 Comment: occasional, last use in October 2017 Allergies: 
No Known Allergies Review of Systems Review of Systems Constitutional: Negative for chills, fatigue and fever. HENT: Negative for congestion, rhinorrhea and sore throat. Respiratory: Negative for cough, chest tightness and wheezing. Cardiovascular: Negative for chest pain. Gastrointestinal: Negative for nausea and vomiting. Musculoskeletal: Positive for back pain (upper back, with movement), neck pain (with movement) and neck stiffness. Skin: Negative for color change and wound. Neurological: Negative for dizziness, seizures, speech difficulty, weakness, numbness and headaches. All other systems reviewed and are negative. Physical Exam  
 
Visit Vitals /80 (BP 1 Location: Left arm, BP Patient Position: At rest) Pulse 71 Temp 99 °F (37.2 °C) Resp 18 Ht 5' 8\" (1.727 m) Wt 83.9 kg (185 lb) LMP 02/09/2019 SpO2 98% Breastfeeding? No  
BMI 28.13 kg/m² Physical Exam  
Constitutional: She is oriented to person, place, and time. She appears well-developed and well-nourished. HENT:  
Head: Normocephalic and atraumatic. Neck: No spinous process tenderness and no muscular tenderness present. No neck rigidity. Decreased range of motion present. No erythema present. Pt demonstrates pain with movement and will not comply with full ROM testing. No adenopathy Cardiovascular: Normal rate, regular rhythm and normal heart sounds. Exam reveals no gallop and no friction rub. No murmur heard. Pulmonary/Chest: Effort normal and breath sounds normal. No respiratory distress. She has no wheezes. She has no rales. She exhibits no tenderness. Abdominal: Guarding: will not move neck voluntarily due to pain elicited by movement. Musculoskeletal: She exhibits tenderness (Upper thoracic spine tender to palpation between scapulae). She exhibits no edema or deformity. Cervical back: She exhibits decreased range of motion (due to pain) and tenderness. She exhibits no swelling, no edema and no deformity. Thoracic back: She exhibits decreased range of motion and tenderness. Back: 
 
Neurological: She is alert and oriented to person, place, and time. She has normal strength. Coordination normal.  
Skin: Skin is warm and dry. She is not diaphoretic. No erythema. Psychiatric: She has a normal mood and affect. Her behavior is normal. Judgment and thought content normal.  
Nursing note and vitals reviewed. Diagnostic Study Results Labs - No results found for this or any previous visit (from the past 12 hour(s)). Radiologic Studies - No results found. Medical Decision Making I am the first provider for this patient. I reviewed the vital signs, available nursing notes, past medical history, past surgical history, family history and social history. Vital Signs-Reviewed the patient's vital signs. Pulse Oximetry Analysis -  98 on room air Records Reviewed: Nursing Notes and Old Medical Records (Time of Review: 1:41 PM) 
 
ED Course: Progress Notes, Reevaluation, and Consults: 
Multiple attempts were made by me to evaluate patient myself after PA student had evaluated patient, but she has not been in her designated room. She was called from the waiting room without answer. She has presumably eloped. Provider Notes (Medical Decision Making):  
Differential diagnosis: muscle sprain (repetitive use injury), herniated disc, epidural abscess, pathologic fracture, avulsion fracture Diagnosis Clinical Impression: 1. Neck pain 2. Patient left before treatment completed Disposition: Eloped Follow-up Information None Medication List  
  
You have not been prescribed any medications. _______________________________

## 2019-06-11 ENCOUNTER — APPOINTMENT (OUTPATIENT)
Dept: GENERAL RADIOLOGY | Age: 39
End: 2019-06-11
Attending: PHYSICIAN ASSISTANT
Payer: MEDICAID

## 2019-06-11 ENCOUNTER — HOSPITAL ENCOUNTER (EMERGENCY)
Age: 39
Discharge: HOME | End: 2019-06-11
Attending: EMERGENCY MEDICINE
Payer: MEDICAID

## 2019-06-11 VITALS
HEART RATE: 72 BPM | HEIGHT: 68 IN | OXYGEN SATURATION: 100 % | WEIGHT: 171 LBS | DIASTOLIC BLOOD PRESSURE: 75 MMHG | TEMPERATURE: 98.7 F | SYSTOLIC BLOOD PRESSURE: 118 MMHG | RESPIRATION RATE: 18 BRPM | BODY MASS INDEX: 25.91 KG/M2

## 2019-06-11 DIAGNOSIS — S83.91XA SPRAIN OF RIGHT KNEE, UNSPECIFIED LIGAMENT, INITIAL ENCOUNTER: Primary | ICD-10-CM

## 2019-06-11 PROCEDURE — 99283 EMERGENCY DEPT VISIT LOW MDM: CPT

## 2019-06-11 PROCEDURE — L1830 KO IMMOB CANVAS LONG PRE OTS: HCPCS

## 2019-06-11 PROCEDURE — 73560 X-RAY EXAM OF KNEE 1 OR 2: CPT

## 2019-06-11 RX ORDER — IBUPROFEN 800 MG/1
800 TABLET ORAL
Qty: 20 TAB | Refills: 0 | Status: SHIPPED | OUTPATIENT
Start: 2019-06-11 | End: 2019-06-18

## 2019-06-11 NOTE — LETTER
NOTIFICATION OF RETURN TO WORK 
 
6/11/2019 8:32 PM 
 
Ms. Lisa Bell [address] [place] [id number] Trevon Alonso To Whom It May Concern: 
 
Lisa Bell was under the care of [address] EMERGENCY DEPT. She will be able to return to work on Friday, 6/14/19. If there are questions or concerns please have the patient contact our office. Sincerely, Mark Jose PA-C
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
## 2019-06-12 NOTE — DISCHARGE INSTRUCTIONS
Patient Education     Please return immediately to the Emergency Room for re-evaluation if you are not improving, develop any new symptoms, or develop worsening of current symptoms! If you have been prescribed a medication and are unable to take this medication for any reason, please return to the Emergency Department for further evaluation! If you have been referred for follow-up to a specialist, but are unable to follow-up and your symptoms are either not improving or are worsening, please return to the Emergency Department for further evaluation! Knee Sprain: Care Instructions  Your Care Instructions    A knee sprain is one or more stretched, partly torn, or completely torn knee ligaments. Ligaments are bands of ropelike tissue that connect bone to bone and make the knee stable. The knee has four main ligaments. Knee sprains often happen because of a twisting or bending injury from sports such as skiing, basketball, soccer, or football. The knee turns one way while the lower or upper leg goes another way. A sprain also can happen when the knee is hit from the side or the front. If a knee ligament is slightly stretched, you will probably need only home treatment. You may need a splint or brace (immobilizer) for a partly torn ligament. A complete tear may need surgery. A minor knee sprain may take up to 6 weeks to heal, while a severe sprain may take months. Follow-up care is a key part of your treatment and safety. Be sure to make and go to all appointments, and call your doctor if you are having problems. It's also a good idea to know your test results and keep a list of the medicines you take. How can you care for yourself at home? · Follow instructions about how much weight you can put on your leg and how to walk with crutches. · Prop up your leg on a pillow when you ice it or anytime you sit or lie down for the next 3 days. Try to keep it above the level of your heart.  This will help reduce swelling. · Put ice or a cold pack on your knee for 10 to 20 minutes at a time. Try to do this every 1 to 2 hours for the next 3 days (when you are awake) or until the swelling goes down. Put a thin cloth between the ice and your skin. Do not get the splint wet. · If you have an elastic bandage, make sure it is snug but not so tight that your leg is numb, tingles, or swells below the bandage. You can loosen the bandage if it is too tight. · Your doctor may recommend a brace (immobilizer) to support your knee while it heals. Wear it as directed. · Ask your doctor if you can take an over-the-counter pain medicine, such as acetaminophen (Tylenol), ibuprofen (Advil, Motrin), or naproxen (Aleve). Be safe with medicines. Read and follow all instructions on the label. When should you call for help? Call 911 anytime you think you may need emergency care. For example, call if:    · You have sudden chest pain and shortness of breath, or you cough up blood.    Call your doctor now or seek immediate medical care if:    · You have increased or severe pain.     · You cannot move your toes or ankle.     · Your foot is cool or pale or changes color.     · You have tingling, weakness, or numbness in your foot or leg.     · Your splint or brace feels too tight.     · You are unable to straighten the knee, or the knee \"locks. \"     · You have signs of a blood clot in your leg, such as:  ? Pain in your calf, back of the knee, thigh, or groin. ? Redness and swelling in your leg.    Watch closely for changes in your health, and be sure to contact your doctor if:    · Your pain is not getting better or is getting worse. Where can you learn more? Go to http://inocencia-hank.info/. Enter N406 in the search box to learn more about \"Knee Sprain: Care Instructions. \"  Current as of: September 20, 2018  Content Version: 11.9  © 6849-1771 [employer].  Care instructions adapted under license by [facility] (which disclaims liability or warranty for this information). If you have questions about a medical condition or this instruction, always ask your healthcare professional. [address] any warranty or liability for your use of this information. Patient Education        Learning About RICE (Rest, Ice, Compression, and Elevation)  What is RICE? RICE is a way to care for an injury. RICE helps relieve pain and swelling. It may also help with healing and flexibility. RICE stands for:  · Rest and protect the injured or sore area. · Ice or a cold pack used as soon as possible. · Compression, or wrapping the injured or sore area with an elastic bandage. · Elevation (propping up) the injured or sore area. How do you do RICE? You can use RICE for home treatment when you have general aches and pains or after an injury or surgery. Rest  · Do not put weight on the injury for at least 24 to 48 hours. · Use crutches for a badly sprained knee or ankle. · Support a sprained wrist, elbow, or shoulder with a sling. Ice  · Put ice or a cold pack on the injury right away to reduce pain and swelling. Frozen vegetables will also work as an ice pack. Put a thin cloth between the ice or cold pack and your skin. The cloth protects the injured area from getting too cold. · Use ice for 10 to 15 minutes at a time for the first 48 to 72 hours. Compression  · Use compression for sprains, strains, and surgeries of the arms and legs. · Wrap the injured area with an elastic bandage or compression sleeve to reduce swelling. · Don't wrap it too tightly. If the area below it feels numb, tingles, or feels cool, loosen the wrap. Elevation  · Use elevation for areas of the body that can be propped up, such as arms and legs. · Prop up the injured area on pillows whenever you use ice. Keep it propped up anytime you sit or lie down.   · Try to keep the injured area at or above the level of your heart. This will help reduce swelling and bruising. Where can you learn more? Go to http://inocencia-hank.info/. Enter I907 in the search box to learn more about \"Learning About RICE (Rest, Ice, Compression, and Elevation). \"  Current as of: September 20, 2018  Content Version: 11.9  © 1880-3840 [employer]. Care instructions adapted under license by [employer] (which disclaims liability or warranty for this information). If you have questions about a medical condition or this instruction, always ask your healthcare professional. [place] [address] any warranty or liability for your use of this information.

## 2019-06-12 NOTE — ED NOTES
Winifred Mclaughlin is a 42 y.o. female that was discharged in stable. Pt was accompanied by friend. Pt is not driving. The patients diagnosis, condition and treatment were explained to  patient and aftercare instructions were given. The patient verbalized understanding. Patient armband removed and shredded.

## 2019-06-13 ENCOUNTER — OFFICE VISIT (OUTPATIENT)
Dept: ORTHOPEDIC SURGERY | Facility: CLINIC | Age: 39
End: 2019-06-13

## 2019-06-13 VITALS
DIASTOLIC BLOOD PRESSURE: 64 MMHG | OXYGEN SATURATION: 99 % | RESPIRATION RATE: 14 BRPM | BODY MASS INDEX: 28.34 KG/M2 | WEIGHT: 187 LBS | HEIGHT: 68 IN | SYSTOLIC BLOOD PRESSURE: 97 MMHG | HEART RATE: 63 BPM

## 2019-06-13 DIAGNOSIS — S83.411A SPRAIN OF MEDIAL COLLATERAL LIGAMENT OF RIGHT KNEE, INITIAL ENCOUNTER: Primary | ICD-10-CM
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
## 2019-06-13 NOTE — LETTER
NOTIFICATION RETURN TO WORK  
 
6/13/2019 3:26 PM 
 
Ms. Alberto Moody [address] [place] [address] [address] [place] [id number] To Whom It May Concern: 
 
Alberto Moody is currently under the care of [address] [place] [address]. She will return to work on: Monday 6-17-19--no restrictions If there are questions or concerns please have the patient contact our office.  
 
 
 
Sincerely, 
 
 
 
Ricardo Olivas MD

## 2019-06-20 ENCOUNTER — DOCUMENTATION ONLY (OUTPATIENT)
Dept: ORTHOPEDIC SURGERY | Facility: CLINIC | Age: 39
End: 2019-06-20

## 2019-06-20 NOTE — PROGRESS NOTES
[employer] (Attending Physician Statement) was received via fax and placed in the forms bin at .

## 2019-09-30 ENCOUNTER — HOSPITAL ENCOUNTER (EMERGENCY)
Age: 39
Discharge: HOME | End: 2019-09-30
Attending: EMERGENCY MEDICINE
Payer: MEDICAID

## 2019-09-30 VITALS
SYSTOLIC BLOOD PRESSURE: 140 MMHG | HEART RATE: 86 BPM | RESPIRATION RATE: 18 BRPM | TEMPERATURE: 98.1 F | DIASTOLIC BLOOD PRESSURE: 110 MMHG | OXYGEN SATURATION: 98 %

## 2019-09-30 DIAGNOSIS — K04.7 DENTAL INFECTION: ICD-10-CM

## 2019-09-30 DIAGNOSIS — K08.89 PAIN, DENTAL: Primary | ICD-10-CM

## 2019-09-30 PROCEDURE — 74011250637 HC RX REV CODE- 250/637: Performed by: EMERGENCY MEDICINE

## 2019-09-30 PROCEDURE — 99283 EMERGENCY DEPT VISIT LOW MDM: CPT

## 2019-09-30 RX ORDER — HYDROCODONE BITARTRATE AND ACETAMINOPHEN 5; 325 MG/1; MG/1
1 TABLET ORAL
Qty: 10 TAB | Refills: 0 | Status: SHIPPED | OUTPATIENT
Start: 2019-09-30 | End: 2019-10-03

## 2019-09-30 RX ORDER — PENICILLIN V POTASSIUM 250 MG/1
500 TABLET, FILM COATED ORAL
Status: COMPLETED | OUTPATIENT
Start: 2019-09-30 | End: 2019-09-30

## 2019-09-30 RX ORDER — HYDROCODONE BITARTRATE AND ACETAMINOPHEN 5; 325 MG/1; MG/1
2 TABLET ORAL ONCE
Status: COMPLETED | OUTPATIENT
Start: 2019-09-30 | End: 2019-09-30

## 2019-09-30 RX ORDER — PENICILLIN V POTASSIUM 500 MG/1
500 TABLET, FILM COATED ORAL 4 TIMES DAILY
Qty: 28 TAB | Refills: 0 | Status: SHIPPED | OUTPATIENT
Start: 2019-09-30 | End: 2019-10-07

## 2019-09-30 RX ADMIN — HYDROCODONE BITARTRATE AND ACETAMINOPHEN 2 TABLET: 5; 325 TABLET ORAL at 10:09

## 2019-09-30 RX ADMIN — PENICILLIN V POTASIUM 500 MG: 250 TABLET ORAL at 10:09

## 2019-09-30 NOTE — ED NOTES
Gricelda Harding is a 42 y.o. female that was discharged in stable condition. The patients diagnosis, condition and treatment were explained to  patient and aftercare instructions were given. The patient verbalized understanding. Patient armband removed and shredded.

## 2019-09-30 NOTE — DISCHARGE INSTRUCTIONS
Patient Education     If you were prescribed any medication take as directed. Do not drive or use heavy equipment if prescribed narcotics. Follow up with your primary care physician or with specialist as directed. Return to the emergency room with any new or worsening conditions. Please follow-up with one of the dental clinics listed below:    [facility] [phone]  [facility] [phone]  [facility] [phone] (For Extractions Only)  [address] [facility] [phone]  [facility] [phone] [address] [phone]    Call to schedule an appointment. Abscessed Tooth: Care Instructions  Your Care Instructions    An abscessed tooth is a tooth that has a pocket of pus in the tissues around it. Pus forms when the body tries to fight an infection caused by bacteria. If the pus cannot drain, it forms an abscess. An abscessed tooth can cause red, swollen gums and throbbing pain, especially when you chew. You may have a bad taste in your mouth and a fever, and your jaw may swell. Damage to the tooth, untreated tooth decay, or gum disease can cause an abscessed tooth. An abscessed tooth needs to be treated by a dental professional right away. If it is not treated, the infection could spread to other parts of your body. Your dentist will give you antibiotics to stop the infection. He or she may make a hole in the tooth or cut open (dillan) the abscess inside your mouth so that the infection can drain, which should relieve your pain. You may need to have a root canal treatment, which tries to save your tooth by taking out the infected pulp and replacing it with a healing medicine and/or a filling. If these treatments do not work, your tooth may have to be removed. Follow-up care is a key part of your treatment and safety. Be sure to make and go to all appointments, and call your doctor if you are having problems.  It's also a good idea to know your test results and keep a list of the medicines you take. How can you care for yourself at home? · Reduce pain and swelling in your face and jaw by putting ice or a cold pack on the outside of your cheek for 10 to 20 minutes at a time. Put a thin cloth between the ice and your skin. · Take pain medicines exactly as directed. ? If the doctor gave you a prescription medicine for pain, take it as prescribed. ? If you are not taking a prescription pain medicine, ask your doctor if you can take an over-the-counter medicine. · Take your antibiotics as directed. Do not stop taking them just because you feel better. You need to take the full course of antibiotics. To prevent tooth abscess  · Brush and floss every day, and have regular dental checkups. · Eat a healthy diet, and avoid sugary foods and drinks. · Do not smoke, use e-cigarettes with nicotine, or use spit tobacco. Tobacco and nicotine slow your ability to heal. Tobacco also increases your risk for gum disease and cancer of the mouth and throat. If you need help quitting, talk to your doctor about stop-smoking programs and medicines. These can increase your chances of quitting for good. When should you call for help? Call 911 anytime you think you may need emergency care. For example, call if:    · You have trouble breathing.    Call your doctor now or seek immediate medical care if:    · You have new or worse symptoms of infection, such as:  ? Increased pain, swelling, warmth, or redness. ? Red streaks leading from the area. ? Pus draining from the area. ? A fever.    Watch closely for changes in your health, and be sure to contact your doctor if:    · You do not get better as expected. Where can you learn more? Go to http://inocencia-hank.info/. Enter N163 in the search box to learn more about \"Abscessed Tooth: Care Instructions. \"  Current as of: October 3, 2018  Content Version: 12.2  © 2640-7628 [employer]. Care instructions adapted under license by [employer] (which disclaims liability or warranty for this information). If you have questions about a medical condition or this instruction, always ask your healthcare professional. [address] any warranty or liability for your use of this information. Patient Education        Tooth and Gum Pain: Care Instructions  Your Care Instructions    The most common causes of dental pain are tooth decay and gum disease. Pain can also be caused by an infection of the tooth (abscess) or the gums. Or you may have pain from a broken or cracked tooth. Other causes of pain include infection and damage to a tooth from nervous grinding of your teeth. A wisdom tooth can be painful when it is coming in but cannot break through the gum. It can also be painful when the tooth is only partway in and extra gum tissue has formed around it. The tissue can get inflamed (pericoronitis), and sometimes it gets infected. Prompt dental care can help find the cause of your toothache and keep the tooth from dying or gum disease from getting worse. Self-care at home may reduce your pain and discomfort. Follow-up care is a key part of your treatment and safety. Be sure to make and go to all appointments, and call your dentist or doctor if you are having problems. It's also a good idea to know your test results and keep a list of the medicines you take. How can you care for yourself at home? · To reduce pain and facial swelling, put an ice or cold pack on the outside of your cheek for 10 to 20 minutes at a time. Put a thin cloth between the ice and your skin. Do not use heat. · If your doctor prescribed antibiotics, take them as directed. Do not stop taking them just because you feel better. You need to take the full course of antibiotics.   · Ask your doctor if you can take an over-the-counter pain medicine, such as acetaminophen (Tylenol), ibuprofen (Advil, Motrin), or naproxen (Aleve). Be safe with medicines. Read and follow all instructions on the label. · Avoid very hot, cold, or sweet foods and drinks if they increase your pain. · Rinse your mouth with warm salt water every 2 hours to help relieve pain and swelling. Mix 1 teaspoon of salt in 8 ounces of water. · Talk to your dentist about using special toothpaste for sensitive teeth. To reduce pain on contact with heat or cold or when brushing, brush with this toothpaste regularly or rub a small amount of the paste on the sensitive area with a clean finger 2 or 3 times a day. Floss gently between your teeth. · Do not smoke or use spit tobacco. Tobacco use can make gum problems worse, decreases your ability to fight infection in your gums, and delays healing. If you need help quitting, talk to your doctor about stop-smoking programs and medicines. These can increase your chances of quitting for good. When should you call for help? Call 911 anytime you think you may need emergency care. For example, call if:    · You have trouble breathing.    Call your dentist or doctor now or seek immediate medical care if:    · You have signs of infection, such as:  ? Increased pain, swelling, warmth, or redness. ? Red streaks leading from the area. ? Pus draining from the area. ? A fever.    Watch closely for changes in your health, and be sure to contact your doctor if:    · You do not get better as expected. Where can you learn more? Go to http://inocencia-hank.info/. Enter [id number] [id number] in the search box to learn more about \"Tooth and Gum Pain: Care Instructions. \"  Current as of: October 3, 2018  Content Version: 12.2  © 4840-0953 [employer]. Care instructions adapted under license by [employer] (which disclaims liability or warranty for this information).  If you have questions about a medical condition or this instruction, always ask your healthcare professional. [address] any warranty or liability for your use of this information.

## 2019-09-30 NOTE — ED TRIAGE NOTES
Patient states a filling fell out of her tooth a couple days ago, bottom left side. Patient tearful. She called dental office but they are unable to see her until tomorrow. She c/o sharp shooting pains in mouth.

## 2019-10-03 NOTE — ED PROVIDER NOTES
EMERGENCY DEPARTMENT HISTORY AND PHYSICAL EXAM    9:44 AM      Date: 9/30/2019  Patient Name: Javier Titus    History of Presenting Illness     Chief Complaint   Patient presents with    Dental Pain         History Provided By: Patient    Additional History (Context): Javier Titus is a 42 y.o. female with Past medical history of syphilis who presents with severe dental pain for the past couple days. Patient states that her feeling came off for left back molar a couple days ago. She states that the pain is 10 out of 10 and that her gum is swollen. Denies any fever, neck pain, difficulty swallowing and no other complaint. He states that Motrin and Tylenol is not helping. Patient states that she was unable to get into see a dentist right away. PCP: Willie Ross MD        Past History     Past Medical History:  Past Medical History:   Diagnosis Date    Syphilis affecting pregnancy        Past Surgical History:  Past Surgical History:   Procedure Laterality Date    HX DILATION AND CURETTAGE      For TABx2       Family History:  Family History   Problem Relation Age of Onset    Hypertension Maternal Grandmother     Cancer Maternal Grandmother         breast cancer, also great grandmother and great aunts    Diabetes Sister 27    Hypertension Sister 34       Social History:  Social History     Tobacco Use    Smoking status: Never Smoker    Smokeless tobacco: Never Used   Substance Use Topics    Alcohol use: No     Comment: very rare    Drug use: No     Types: Marijuana     Comment: occasional, last use in October 2017       Allergies:  No Known Allergies      Review of Systems       Review of Systems   Constitutional: Negative for chills and fever. HENT: Positive for dental problem. Negative for congestion, drooling, facial swelling, rhinorrhea, sore throat and trouble swallowing. Eyes: Negative for visual disturbance. Respiratory: Negative for cough.     Gastrointestinal: Negative for nausea and vomiting. Musculoskeletal: Negative for arthralgias and neck stiffness. Skin: Negative for pallor and rash. Neurological: Negative for dizziness and headaches. Hematological: Does not bruise/bleed easily. Psychiatric/Behavioral: Negative for confusion and dysphoric mood. All other systems reviewed and are negative. Physical Exam     Visit Vitals  BP (!) 140/110 (BP 1 Location: Left arm, BP Patient Position: At rest;Sitting)   Pulse 86   Temp 98.1 °F (36.7 °C)   Resp 18   SpO2 98%         Physical Exam   Constitutional: She is oriented to person, place, and time. She appears well-developed and well-nourished. She appears distressed. Appears in distress from pain   HENT:   Head: Normocephalic and atraumatic. Mouth/Throat: Oropharynx is clear and moist. No oropharyngeal exudate. Dental decay and back lower molar with filling apparently gone  Very tender to tap that tooth, and some tenderness and redness at the gum lateral to that tooth, but no gumline abscess noted  Uvula is midline   Eyes: Conjunctivae are normal. No scleral icterus. Neck: Normal range of motion. Neck supple. Full range of motion head neck   Cardiovascular: Normal rate and intact distal pulses. Pulmonary/Chest: Effort normal and breath sounds normal. No respiratory distress. Musculoskeletal: Normal range of motion. She exhibits no edema. Lymphadenopathy:     She has no cervical adenopathy. Neurological: She is alert and oriented to person, place, and time. No cranial nerve deficit. Skin: Skin is warm and dry. She is not diaphoretic. Nursing note and vitals reviewed. Diagnostic Study Results     Labs -  No results found for this or any previous visit (from the past 12 hour(s)). Radiologic Studies -   No orders to display         Medical Decision Making   I am the first provider for this patient.     I reviewed the vital signs, available nursing notes, past medical history, past surgical history, family history and social history. Vital Signs-Reviewed the patient's vital signs. Records Reviewed: Nursing Notes and Old Medical Records (Time of Review: 10:55 PM)    Provider Notes (Medical Decision Making): Possible dental abscess, dental decay with apparent filling that fell off. Appears in pain. Gave dose of antibiotic and pain medicine in the ED. Will give prescription for pain medicine and antibiotic. Follow-up dental referrals  MDM    Medications   penicillin v potassium (VEETID) tablet 500 mg (500 mg Oral Given 9/30/19 1009)   HYDROcodone-acetaminophen (NORCO) 5-325 mg per tablet 2 Tab (2 Tabs Oral Given 9/30/19 1009)           ED Course: Progress Notes, Reevaluation, and Consults:  I have reassessed the patient. I have discussed the workup, results and plan with the patient and patient is in agreement. Patient is feeling better. Patient will be prescribed penicillin VK, Norco.  Patient was discharge in stable condition. Patient was given outpatient follow up. Patient is to return to emergency department if any new or worsening condition. Diagnosis     Clinical Impression:   1. Pain, dental    2. Dental infection        Disposition: Discharged    Follow-up Information     Follow up With Specialties Details Why [address] [place] [address]  Schedule an appointment as soon as possible for a visit today  [address]i [phone]  [phone]           Discharge Medication List as of 9/30/2019 10:10 AM      START taking these medications    Details   penicillin v potassium (VEETID) 500 mg tablet Take 1 Tab by mouth four (4) times daily for 7 days. Indications: dental infection, Print, Disp-28 Tab, R-0      HYDROcodone-acetaminophen (NORCO) 5-325 mg per tablet Take 1 Tab by mouth every six (6) hours as needed for Pain for up to 3 days.  Max Daily Amount: 4 Tabs., Print, Disp-10 Tab, R-0               Igor Jefferson DO    Dragon medical dictation software was used for portions of this report. Unintended transcription errors may occur. My signature above authenticates this document and my orders, the final    diagnosis (es), discharge prescription (s), and instructions in the Epic    record.

## 2022-03-18 PROBLEM — M54.9 BACK PAIN: Status: ACTIVE | Noted: 2018-05-18

## 2022-03-19 PROBLEM — Z34.90 PREGNANCY: Status: ACTIVE | Noted: 2018-07-13

## 2022-03-20 PROBLEM — Z3A.09 9 WEEKS GESTATION OF PREGNANCY: Status: ACTIVE | Noted: 2017-12-27

## 2022-11-13 ENCOUNTER — HOSPITAL ENCOUNTER (EMERGENCY)
Age: 42
Discharge: HOME | End: 2022-11-13
Attending: EMERGENCY MEDICINE
Payer: MEDICAID

## 2022-11-13 ENCOUNTER — APPOINTMENT (OUTPATIENT)
Dept: GENERAL RADIOLOGY | Age: 42
End: 2022-11-13
Attending: EMERGENCY MEDICINE
Payer: MEDICAID

## 2022-11-13 VITALS
DIASTOLIC BLOOD PRESSURE: 83 MMHG | WEIGHT: 182 LBS | RESPIRATION RATE: 16 BRPM | HEIGHT: 68 IN | SYSTOLIC BLOOD PRESSURE: 128 MMHG | BODY MASS INDEX: 27.58 KG/M2 | OXYGEN SATURATION: 100 % | TEMPERATURE: 98.5 F | HEART RATE: 82 BPM

## 2022-11-13 DIAGNOSIS — B34.9 VIRAL SYNDROME: Primary | ICD-10-CM

## 2022-11-13 LAB
COVID-19 RAPID TEST, COVR: DETECTED
FLUAV AG NPH QL IA: NEGATIVE
FLUBV AG NOSE QL IA: NEGATIVE
SOURCE, COVRS: ABNORMAL

## 2022-11-13 PROCEDURE — 87635 SARS-COV-2 COVID-19 AMP PRB: CPT

## 2022-11-13 PROCEDURE — 99284 EMERGENCY DEPT VISIT MOD MDM: CPT

## 2022-11-13 PROCEDURE — 71045 X-RAY EXAM CHEST 1 VIEW: CPT

## 2022-11-13 PROCEDURE — 87804 INFLUENZA ASSAY W/OPTIC: CPT

## 2022-11-13 PROCEDURE — 74011250637 HC RX REV CODE- 250/637: Performed by: EMERGENCY MEDICINE

## 2022-11-13 RX ORDER — BENZONATATE 100 MG/1
200 CAPSULE ORAL
Qty: 15 CAPSULE | Refills: 0 | Status: SHIPPED | OUTPATIENT
Start: 2022-11-13 | End: 2022-11-18

## 2022-11-13 RX ORDER — CODEINE PHOSPHATE AND GUAIFENESIN 10; 100 MG/5ML; MG/5ML
5 SOLUTION ORAL
Qty: 50 ML | Refills: 0 | Status: SHIPPED | OUTPATIENT
Start: 2022-11-13 | End: 2022-11-16

## 2022-11-13 RX ORDER — IBUPROFEN 600 MG/1
600 TABLET ORAL
Qty: 20 TABLET | Refills: 0 | Status: SHIPPED | OUTPATIENT
Start: 2022-11-13

## 2022-11-13 RX ORDER — BENZONATATE 100 MG/1
200 CAPSULE ORAL
Status: COMPLETED | OUTPATIENT
Start: 2022-11-13 | End: 2022-11-13

## 2022-11-13 RX ORDER — IBUPROFEN 600 MG/1
600 TABLET ORAL
Status: COMPLETED | OUTPATIENT
Start: 2022-11-13 | End: 2022-11-13

## 2022-11-13 RX ADMIN — IBUPROFEN 600 MG: 600 TABLET, FILM COATED ORAL at 08:12

## 2022-11-13 RX ADMIN — BENZONATATE 200 MG: 100 CAPSULE ORAL at 08:12

## 2022-11-13 NOTE — ED PROVIDER NOTES
EMERGENCY DEPARTMENT HISTORY AND PHYSICAL EXAM    8:29 AM      Date: 11/13/2022  Patient Name: Marlen Lainez    History of Presenting Illness     Chief Complaint   Patient presents with    Cough    Fever    Headache    Nasal Congestion    Sore Throat    Fatigue     Pt presents to ED with c/o coug, fever, chills, headache, fatigue, muscle aches, sinus congestion for 3 days. Pt is tearful         History Provided By: Patient  Location/Duration/Severity/Modifying factors   Patient is a 61-year-old female with a history of COVID exposure at work, works at [employer], [address] [place] [address] Dr vaccination, and not flu vaccinated the presents emergency department complaint of 2 days of cough, congestion, runny nose, and myalgias. The patient took some over-the-counter Luisa-[place]wood at 12 AM as well as Mucinex without relief. Patient is having increasing shortness of breath this morning and wanted to be evaluated. Patient denies history of asthma, and denies any chance of being pregnant said her last period was 1 week ago is not sexually active. The patient occasionally drinks alcohol, denies any drug use, and is a manager at [employer]. Patient's child at home is also having some URI symptoms. Patient is not taking a home COVID test.      PCP: Kike Corey MD        Past History     Past Medical History:  Past Medical History:   Diagnosis Date    Syphilis affecting pregnancy        Past Surgical History:  Past Surgical History:   Procedure Laterality Date    HX DILATION AND CURETTAGE      For TABx2       Family History:  Family History   Problem Relation Age of Onset    Hypertension Maternal Grandmother     Cancer Maternal Grandmother         breast cancer, also great grandmother and great aunts    Diabetes Sister 27    Hypertension Sister 34       Social History:  Social History     Tobacco Use    Smoking status: Never    Smokeless tobacco: Never   Substance Use Topics    Alcohol use: No     Comment: very rare    Drug use:  No Types: Marijuana     Comment: occasional, last use in October 2017       Allergies:  No Known Allergies      Review of Systems       Review of Systems   Constitutional:  Positive for chills. Negative for activity change, fatigue and fever. HENT:  Positive for congestion and rhinorrhea. Eyes:  Negative for visual disturbance. Respiratory:  Positive for cough and shortness of breath. Cardiovascular:  Negative for chest pain and palpitations. Gastrointestinal:  Negative for abdominal pain, diarrhea, nausea and vomiting. Genitourinary:  Negative for dysuria and hematuria. Musculoskeletal:  Negative for back pain. Skin:  Negative for rash. Neurological:  Negative for dizziness, weakness and light-headedness. All other systems reviewed and are negative. Physical Exam   Visit Vitals  /83 (BP 1 Location: Right upper arm, BP Patient Position: At rest;Sitting)   Pulse 82   Temp 98.5 °F (36.9 °C)   Resp 16   Ht 5' 8\" (1.727 m)   Wt 82.6 kg (182 lb)   SpO2 100%   BMI 27.67 kg/m²         Physical Exam  Vitals and nursing note reviewed. Constitutional:       General: She is not in acute distress. Appearance: She is well-developed. HENT:      Head: Normocephalic and atraumatic. Right Ear: External ear normal.      Left Ear: External ear normal.      Ears:      Comments: Right TM dull with mild erythema, left TM normal     Nose: Congestion and rhinorrhea present. Mouth/Throat:      Pharynx: Posterior oropharyngeal erythema present. Eyes:      General: No scleral icterus. Conjunctiva/sclera: Conjunctivae normal.      Pupils: Pupils are equal, round, and reactive to light. Neck:      Thyroid: No thyromegaly. Vascular: No JVD. Trachea: No tracheal deviation. Cardiovascular:      Rate and Rhythm: Normal rate and regular rhythm. Heart sounds: Normal heart sounds. No murmur heard. No friction rub. No gallop. Pulmonary:      Breath sounds: Rhonchi present. Comments: Mild dyspnea  Chest:      Chest wall: No tenderness. Abdominal:      General: Bowel sounds are normal. There is no distension. Palpations: Abdomen is soft. Tenderness: There is no abdominal tenderness. There is no guarding or rebound. Musculoskeletal:         General: No tenderness. Normal range of motion. Cervical back: Normal range of motion and neck supple. Lymphadenopathy:      Cervical: No cervical adenopathy. Skin:     General: Skin is warm and dry. Neurological:      Mental Status: She is alert and oriented to person, place, and time. Cranial Nerves: No cranial nerve deficit. Coordination: Coordination normal.      Comments: No sensory loss, Gait normal, Motor 5/5   Psychiatric:         Behavior: Behavior normal.         Thought Content: Thought content normal.         Judgment: Judgment normal.         Diagnostic Study Results     Labs -  Recent Results (from the past 12 hour(s))   INFLUENZA A & B AG (RAPID TEST)    Collection Time: 11/13/22  7:53 AM   Result Value Ref Range    Influenza A Antigen Negative NEG      Influenza B Antigen Negative NEG         Radiologic Studies -   XR CHEST PORT   Final Result      No evidence of acute pulmonary disease. Medical Decision Making   I am the first provider for this patient. I reviewed the vital signs, available nursing notes, past medical history, past surgical history, family history and social history. Vital Signs-Reviewed the patient's vital signs. Records Reviewed: Nursing Notes, Old Medical Records, Previous Radiology Studies, and Previous Laboratory Studies (Time of Review: 8:29 AM)    ED Course: Progress Notes, Reevaluation, and Consults: The patient improved with supportive care and was discharged home. The discharge instructions were reviewed with the patient and the patient verbalized understanding. The patient had no questions about the discharge instructions.   The patient will follow closely with the outpatient care plan and will return if at all worsened or concerned. Jose Cortes DO    Staff called the patient to update her on her COVID status. The patient has not clear high risk criteria and will be treated supportively. Provider Notes (Medical Decision Making):   MDM  Number of Diagnoses or Management Options  Viral syndrome  Diagnosis management comments: Patient is a 53-year-old female without significant medical history presents emergency department with cough, congestion, and shortness of breath. The patient is not fully vaccinated and has had 1 COVID vaccination and has shortness of breath and cough with myalgias today. We will follow patient's rapid flu, COVID, chest x-ray given the shortness of breath, supportive care, then reevaluate. Jose Cortes DO 8:33 AM          Procedures          Diagnosis     Clinical Impression:   1. Viral syndrome        Disposition: DC    Follow-up Information    None          Patient's Medications    No medications on file     Disclaimer: Sections of this note are dictated using utilizing voice recognition software. Minor typographical errors may be present. If questions arise, please do not hesitate to contact me or call our department.

## 2022-11-13 NOTE — ED NOTES
Pt presents with c/o chills, fever, muscle aches, fatigue, headache, sore throat, cough, nasal congestion for 3 days.

## 2022-11-13 NOTE — DISCHARGE INSTRUCTIONS
Make sure to follow-up your COVID test should come back in the next 2 hours. You are otherwise low risk and will need to hydrate yourself well, rest, and take medications for your symptoms. Please make sure to follow-up with your primary doctor and cannot return to work until you are fever free for 24 hours and if your COVID test is positive will need to follow CDC recommendations for return to work.

## 2022-11-13 NOTE — Clinical Note
[address] [facility] EMERGENCY DEPT  [id number] [address] [phone]    Work/School Note    Date: 11/13/2022    To Whom It May concern:    Raeann Moses was seen and treated today in the emergency room by the following provider(s):  Attending Provider: Karuna Cottrell MD.      Raeann Moses is excused from work/school on 11/13/22 and 11/14/22. She is medically clear to return to work/school on 11/15/2022. Can return to work if you are fever free for 24 hours, and your COVID test is negative. If your COVID test is positive we will recommend CDC return to work guidelines.     Sincerely,          Oswaldo Botello MD

## 2022-11-23 ENCOUNTER — HOSPITAL ENCOUNTER (EMERGENCY)
Age: 42
Discharge: HOME | End: 2022-11-23
Attending: EMERGENCY MEDICINE
Payer: MEDICAID

## 2022-11-23 ENCOUNTER — APPOINTMENT (OUTPATIENT)
Dept: CT IMAGING | Age: 42
End: 2022-11-23
Attending: EMERGENCY MEDICINE
Payer: MEDICAID

## 2022-11-23 VITALS
HEART RATE: 65 BPM | DIASTOLIC BLOOD PRESSURE: 68 MMHG | HEIGHT: 64 IN | RESPIRATION RATE: 18 BRPM | WEIGHT: 182 LBS | TEMPERATURE: 98.2 F | BODY MASS INDEX: 31.07 KG/M2 | OXYGEN SATURATION: 98 % | SYSTOLIC BLOOD PRESSURE: 119 MMHG

## 2022-11-23 DIAGNOSIS — F43.21 GRIEF REACTION: Primary | ICD-10-CM

## 2022-11-23 LAB
ALBUMIN SERPL-MCNC: 3.8 G/DL (ref 3.4–5)
ALBUMIN/GLOB SERPL: 0.9 {RATIO} (ref 0.8–1.7)
ALP SERPL-CCNC: 95 U/L (ref 45–117)
ALT SERPL-CCNC: 35 U/L (ref 13–56)
AMPHET UR QL SCN: NEGATIVE
ANION GAP SERPL CALC-SCNC: 9 MMOL/L (ref 3–18)
APPEARANCE UR: CLEAR
AST SERPL-CCNC: 18 U/L (ref 10–38)
ATRIAL RATE: 69 BPM
ATRIAL RATE: 81 BPM
BACTERIA URNS QL MICRO: ABNORMAL /HPF
BARBITURATES UR QL SCN: NEGATIVE
BASOPHILS # BLD: 0.1 K/UL (ref 0–0.1)
BASOPHILS NFR BLD: 1 % (ref 0–2)
BENZODIAZ UR QL: NEGATIVE
BILIRUB SERPL-MCNC: 0.4 MG/DL (ref 0.2–1)
BILIRUB UR QL: NEGATIVE
BUN SERPL-MCNC: 6 MG/DL (ref 7–18)
BUN/CREAT SERPL: 6 (ref 12–20)
CALCIUM SERPL-MCNC: 8.6 MG/DL (ref 8.5–10.1)
CALCULATED P AXIS, ECG09: 50 DEGREES
CALCULATED P AXIS, ECG09: 53 DEGREES
CALCULATED R AXIS, ECG10: 42 DEGREES
CALCULATED R AXIS, ECG10: 43 DEGREES
CALCULATED T AXIS, ECG11: 34 DEGREES
CALCULATED T AXIS, ECG11: 46 DEGREES
CANNABINOIDS UR QL SCN: POSITIVE
CHLORIDE SERPL-SCNC: 106 MMOL/L (ref 100–111)
CO2 SERPL-SCNC: 24 MMOL/L (ref 21–32)
COCAINE UR QL SCN: NEGATIVE
COLOR UR: YELLOW
CREAT SERPL-MCNC: 1.08 MG/DL (ref 0.6–1.3)
DIAGNOSIS, 93000: NORMAL
DIAGNOSIS, 93000: NORMAL
DIFFERENTIAL METHOD BLD: ABNORMAL
EOSINOPHIL # BLD: 0.3 K/UL (ref 0–0.4)
EOSINOPHIL NFR BLD: 3 % (ref 0–5)
EPITH CASTS URNS QL MICRO: ABNORMAL /LPF (ref 0–5)
ERYTHROCYTE [DISTWIDTH] IN BLOOD BY AUTOMATED COUNT: 12.6 % (ref 11.6–14.5)
ETHANOL SERPL-MCNC: 10 MG/DL (ref 0–3)
GLOBULIN SER CALC-MCNC: 4.4 G/DL (ref 2–4)
GLUCOSE SERPL-MCNC: 112 MG/DL (ref 74–99)
GLUCOSE UR STRIP.AUTO-MCNC: NEGATIVE MG/DL
HCG SERPL QL: NEGATIVE
HCT VFR BLD AUTO: 38.1 % (ref 35–45)
HDSCOM,HDSCOM: ABNORMAL
HGB BLD-MCNC: 12.8 G/DL (ref 12–16)
HGB UR QL STRIP: ABNORMAL
IMM GRANULOCYTES # BLD AUTO: 0.1 K/UL (ref 0–0.04)
IMM GRANULOCYTES NFR BLD AUTO: 1 % (ref 0–0.5)
KETONES UR QL STRIP.AUTO: NEGATIVE MG/DL
LEUKOCYTE ESTERASE UR QL STRIP.AUTO: ABNORMAL
LYMPHOCYTES # BLD: 4 K/UL (ref 0.9–3.6)
LYMPHOCYTES NFR BLD: 39 % (ref 21–52)
MCH RBC QN AUTO: 28.8 PG (ref 24–34)
MCHC RBC AUTO-ENTMCNC: 33.6 G/DL (ref 31–37)
MCV RBC AUTO: 85.8 FL (ref 78–100)
METHADONE UR QL: NEGATIVE
MONOCYTES # BLD: 0.5 K/UL (ref 0.05–1.2)
MONOCYTES NFR BLD: 5 % (ref 3–10)
NEUTS SEG # BLD: 5.2 K/UL (ref 1.8–8)
NEUTS SEG NFR BLD: 51 % (ref 40–73)
NITRITE UR QL STRIP.AUTO: NEGATIVE
NRBC # BLD: 0 K/UL (ref 0–0.01)
NRBC BLD-RTO: 0 PER 100 WBC
OPIATES UR QL: NEGATIVE
P-R INTERVAL, ECG05: 162 MS
P-R INTERVAL, ECG05: 172 MS
PCP UR QL: NEGATIVE
PH UR STRIP: 6 [PH] (ref 5–8)
PLATELET # BLD AUTO: 414 K/UL (ref 135–420)
PMV BLD AUTO: 10.3 FL (ref 9.2–11.8)
POTASSIUM SERPL-SCNC: 3.5 MMOL/L (ref 3.5–5.5)
PROT SERPL-MCNC: 8.2 G/DL (ref 6.4–8.2)
PROT UR STRIP-MCNC: NEGATIVE MG/DL
Q-T INTERVAL, ECG07: 356 MS
Q-T INTERVAL, ECG07: 378 MS
QRS DURATION, ECG06: 78 MS
QRS DURATION, ECG06: 78 MS
QTC CALCULATION (BEZET), ECG08: 405 MS
QTC CALCULATION (BEZET), ECG08: 413 MS
RBC # BLD AUTO: 4.44 M/UL (ref 4.2–5.3)
RBC #/AREA URNS HPF: ABNORMAL /HPF (ref 0–5)
SODIUM SERPL-SCNC: 139 MMOL/L (ref 136–145)
SP GR UR REFRACTOMETRY: 1.01 (ref 1–1.03)
UROBILINOGEN UR QL STRIP.AUTO: 1 EU/DL (ref 0.2–1)
VENTRICULAR RATE, ECG03: 69 BPM
VENTRICULAR RATE, ECG03: 81 BPM
WBC # BLD AUTO: 10.2 K/UL (ref 4.6–13.2)
WBC URNS QL MICRO: ABNORMAL /HPF (ref 0–4)

## 2022-11-23 PROCEDURE — 80307 DRUG TEST PRSMV CHEM ANLYZR: CPT

## 2022-11-23 PROCEDURE — 93005 ELECTROCARDIOGRAM TRACING: CPT

## 2022-11-23 PROCEDURE — 80053 COMPREHEN METABOLIC PANEL: CPT

## 2022-11-23 PROCEDURE — 96375 TX/PRO/DX INJ NEW DRUG ADDON: CPT

## 2022-11-23 PROCEDURE — 74011250636 HC RX REV CODE- 250/636: Performed by: EMERGENCY MEDICINE

## 2022-11-23 PROCEDURE — 85025 COMPLETE CBC W/AUTO DIFF WBC: CPT

## 2022-11-23 PROCEDURE — 96374 THER/PROPH/DIAG INJ IV PUSH: CPT

## 2022-11-23 PROCEDURE — 84703 CHORIONIC GONADOTROPIN ASSAY: CPT

## 2022-11-23 PROCEDURE — 70450 CT HEAD/BRAIN W/O DYE: CPT

## 2022-11-23 PROCEDURE — 99284 EMERGENCY DEPT VISIT MOD MDM: CPT

## 2022-11-23 PROCEDURE — 82077 ASSAY SPEC XCP UR&BREATH IA: CPT

## 2022-11-23 PROCEDURE — 81001 URINALYSIS AUTO W/SCOPE: CPT

## 2022-11-23 RX ORDER — LORAZEPAM 0.5 MG/1
0.5 TABLET ORAL
Qty: 10 TABLET | Refills: 0 | Status: SHIPPED | OUTPATIENT
Start: 2022-11-23

## 2022-11-23 RX ORDER — KETOROLAC TROMETHAMINE 15 MG/ML
15 INJECTION, SOLUTION INTRAMUSCULAR; INTRAVENOUS
Status: COMPLETED | OUTPATIENT
Start: 2022-11-23 | End: 2022-11-23

## 2022-11-23 RX ORDER — LORAZEPAM 2 MG/ML
1 INJECTION INTRAMUSCULAR
Status: COMPLETED | OUTPATIENT
Start: 2022-11-23 | End: 2022-11-23

## 2022-11-23 RX ADMIN — KETOROLAC TROMETHAMINE 15 MG: 15 INJECTION, SOLUTION INTRAMUSCULAR; INTRAVENOUS at 04:13

## 2022-11-23 RX ADMIN — LORAZEPAM 1 MG: 2 INJECTION INTRAMUSCULAR; INTRAVENOUS at 04:13

## 2022-11-23 NOTE — ED NOTES
Pt placed on full monitor,  VSS. Seizure pads in place. Call bell within reach. IV obtained via ultrasound. Pt remains nonverbal and does not move aside from head shakes and nods at this time.

## 2022-11-23 NOTE — ED TRIAGE NOTES
Pt presents via EMS with complaints of seizure. Per EMS, pts last seizure 5 years ago, not currently taking medication. Per EMS, pts daughter stated the pt found out one of her family members was killed in the shooting at the [facility], [place] earlier this evening and fell to the floor and had a seizure. Pt found in postictal state by EMS. Pt is presently alert, shaking head yes and no to all questions, but does not verbalize or move any other part of her body. VSS.

## 2022-11-23 NOTE — ED NOTES
I have reviewed discharge instructions with the patient and son. The patient and son verbalized understanding. Patient armband removed and given to patient to take home. Patient was informed of the privacy risks if armband lost or stolen  Current Discharge Medication List        START taking these medications    Details   LORazepam (Ativan) 0.5 mg tablet Take 1 Tablet by mouth every six (6) hours as needed for Anxiety or Agitation. Max Daily Amount: 2 mg.   Qty: 10 Tablet, Refills: 0  Start date: 11/23/2022    Associated Diagnoses: Grief reaction

## 2022-11-23 NOTE — ED PROVIDER NOTES
EMERGENCY DEPARTMENT HISTORY AND PHYSICAL EXAM    4:07 AM      Date: 11/23/2022  Patient Name: Silas Sharpe    History of Presenting Illness     Chief Complaint   Patient presents with    Seizure         History Provided By: Patient  Location/Duration/Severity/Modifying factors     Pt presents via EMS with complaints of of having a seizure. Per EMS, pts last seizure was 5 years ago. She currently is not taking medication. Per EMS, pts daughter stated the pt found out one of her family members was killed in a shooting at the [facility] earlier this evening. Family states the patient fell to the floor and was unresponsive for  a few second. Patient was found in a confused states. Pt is presently alert, shaking head yes and no to all questions, but does not verbalize or move any other part of her body. Vital signs were normal upon arrival by EMS. Seizure         PCP: None    Current Facility-Administered Medications   Medication Dose Route Frequency Provider Last Rate Last Admin    ketorolac (TORADOL) injection 15 mg  15 mg IntraVENous NOW Mey Whyte MD         Current Outpatient Medications   Medication Sig Dispense Refill    ibuprofen (MOTRIN) 600 mg tablet Take 1 Tablet by mouth every six (6) hours as needed for Pain. 20 Tablet 0       Past History     Past Medical History:  Past Medical History:   Diagnosis Date    Seizures ([address]     Syphilis affecting pregnancy        Past Surgical History:  Past Surgical History:   Procedure Laterality Date    HX DILATION AND CURETTAGE      For TABx2       Family History:  Family History   Problem Relation Age of Onset    Hypertension Maternal Grandmother     Cancer Maternal Grandmother         breast cancer, also great grandmother and great aunts    Diabetes Sister 27    Hypertension Sister 34       Social History:  Social History     Tobacco Use    Smoking status: Never    Smokeless tobacco: Never   Substance Use Topics    Alcohol use:  No Comment: very rare    Drug use: No     Types: Marijuana     Comment: occasional, last use in October 2017       Allergies:  No Known Allergies      Review of Systems       Review of Systems   Constitutional: Negative. HENT: Negative. Eyes: Negative. Respiratory: Negative. Cardiovascular: Negative. Gastrointestinal: Negative. Endocrine: Negative. Genitourinary: Negative. Musculoskeletal: Negative. Skin: Negative. Allergic/Immunologic: Negative. Neurological: Negative. Hematological: Negative. Psychiatric/Behavioral: Negative. All other systems reviewed and are negative. Physical Exam   Visit Vitals  /79   Pulse 71   Temp 98.1 °F (36.7 °C)   Resp 18   Ht 5' 4\" (1.626 m)   Wt 82.6 kg (182 lb)   SpO2 100%   BMI 31.24 kg/m²         Physical Exam  Vitals and nursing note reviewed. Constitutional:       General: She is not in acute distress. Appearance: She is well-developed. She is not diaphoretic. HENT:      Head: Normocephalic. Right Ear: External ear normal.      Left Ear: External ear normal.      Mouth/Throat:      Pharynx: No oropharyngeal exudate. Eyes:      General: No scleral icterus. Right eye: No discharge. Left eye: No discharge. Conjunctiva/sclera: Conjunctivae normal.      Pupils: Pupils are equal, round, and reactive to light. Neck:      Thyroid: No thyromegaly. Vascular: No JVD. Trachea: No tracheal deviation. Cardiovascular:      Rate and Rhythm: Normal rate and regular rhythm. Heart sounds: Normal heart sounds. No murmur heard. No friction rub. No gallop. Pulmonary:      Effort: Pulmonary effort is normal. No respiratory distress. Breath sounds: Normal breath sounds. No stridor. No wheezing or rales. Chest:      Chest wall: No tenderness. Abdominal:      General: Bowel sounds are normal. There is no distension. Palpations: Abdomen is soft. There is no mass.       Tenderness: There is no abdominal tenderness. There is no guarding or rebound. Musculoskeletal:         General: No tenderness. Normal range of motion. Cervical back: Normal range of motion and neck supple. Lymphadenopathy:      Cervical: No cervical adenopathy. Skin:     General: Skin is warm and dry. Coloration: Skin is not pale. Findings: No erythema or rash. Neurological:      Mental Status: She is alert and oriented to person, place, and time. Cranial Nerves: No cranial nerve deficit. Motor: No abnormal muscle tone. Coordination: Coordination normal.      Deep Tendon Reflexes: Reflexes normal.         Diagnostic Study Results     Labs -  Recent Results (from the past 12 hour(s))   CBC WITH AUTOMATED DIFF    Collection Time: 11/23/22  2:30 AM   Result Value Ref Range    WBC 10.2 4.6 - 13.2 K/uL    RBC 4.44 4.20 - 5.30 M/uL    HGB 12.8 12.0 - 16.0 g/dL    HCT 38.1 35.0 - 45.0 %    MCV 85.8 78.0 - 100.0 FL    MCH 28.8 24.0 - 34.0 PG    MCHC 33.6 31.0 - 37.0 g/dL    RDW 12.6 11.6 - 14.5 %    PLATELET 745 425 - 545 K/uL    MPV 10.3 9.2 - 11.8 FL    NRBC 0.0 0  WBC    ABSOLUTE NRBC 0.00 0.00 - 0.01 K/uL    NEUTROPHILS 51 40 - 73 %    LYMPHOCYTES 39 21 - 52 %    MONOCYTES 5 3 - 10 %    EOSINOPHILS 3 0 - 5 %    BASOPHILS 1 0 - 2 %    IMMATURE GRANULOCYTES 1 (H) 0.0 - 0.5 %    ABS. NEUTROPHILS 5.2 1.8 - 8.0 K/UL    ABS. LYMPHOCYTES 4.0 (H) 0.9 - 3.6 K/UL    ABS. MONOCYTES 0.5 0.05 - 1.2 K/UL    ABS. EOSINOPHILS 0.3 0.0 - 0.4 K/UL    ABS. BASOPHILS 0.1 0.0 - 0.1 K/UL    ABS. IMM.  GRANS. 0.1 (H) 0.00 - 0.04 K/UL    DF AUTOMATED     METABOLIC PANEL, COMPREHENSIVE    Collection Time: 11/23/22  2:30 AM   Result Value Ref Range    Sodium 139 136 - 145 mmol/L    Potassium 3.5 3.5 - 5.5 mmol/L    Chloride 106 100 - 111 mmol/L    CO2 24 21 - 32 mmol/L    Anion gap 9 3.0 - 18 mmol/L    Glucose 112 (H) 74 - 99 mg/dL    BUN 6 (L) 7.0 - 18 MG/DL    Creatinine 1.08 0.6 - 1.3 MG/DL    BUN/Creatinine ratio 6 (L) 12 - 20      eGFR >60 >60 ml/min/1.73m2    Calcium 8.6 8.5 - 10.1 MG/DL    Bilirubin, total 0.4 0.2 - 1.0 MG/DL    ALT (SGPT) 35 13 - 56 U/L    AST (SGOT) 18 10 - 38 U/L    Alk.  phosphatase 95 45 - 117 U/L    Protein, total 8.2 6.4 - 8.2 g/dL    Albumin 3.8 3.4 - 5.0 g/dL    Globulin 4.4 (H) 2.0 - 4.0 g/dL    A-G Ratio 0.9 0.8 - 1.7     ETHYL ALCOHOL    Collection Time: 11/23/22  2:30 AM   Result Value Ref Range    ALCOHOL(ETHYL),SERUM 10 (H) 0 - 3 MG/DL   HCG QL SERUM    Collection Time: 11/23/22  2:30 AM   Result Value Ref Range    HCG, Ql. Negative NEG     EKG, 12 LEAD, INITIAL    Collection Time: 11/23/22  2:53 AM   Result Value Ref Range    Ventricular Rate 81 BPM    Atrial Rate 81 BPM    P-R Interval 162 ms    QRS Duration 78 ms    Q-T Interval 356 ms    QTC Calculation (Bezet) 413 ms    Calculated P Axis 53 degrees    Calculated R Axis 43 degrees    Calculated T Axis 46 degrees    Diagnosis       Sinus rhythm with fusion complexes  Septal infarct , age undetermined  Abnormal ECG  When compared with ECG of 16-JUL-2018 17:12,  fusion complexes are now present  SD interval has decreased     DRUG SCREEN, URINE    Collection Time: 11/23/22  3:10 AM   Result Value Ref Range    BENZODIAZEPINES Negative NEG      BARBITURATES Negative NEG      THC (TH-CANNABINOL) Positive (A) NEG      OPIATES Negative NEG      PCP(PHENCYCLIDINE) Negative NEG      COCAINE Negative NEG      AMPHETAMINES Negative NEG      METHADONE Negative NEG      HDSCOM (NOTE)    URINALYSIS W/ RFLX MICROSCOPIC    Collection Time: 11/23/22  3:10 AM   Result Value Ref Range    Color YELLOW      Appearance CLEAR      Specific gravity 1.015 1.005 - 1.030      pH (UA) 6.0 5.0 - 8.0      Protein Negative NEG mg/dL    Glucose Negative NEG mg/dL    Ketone Negative NEG mg/dL    Bilirubin Negative NEG      Blood SMALL (A) NEG      Urobilinogen 1.0 0.2 - 1.0 EU/dL    Nitrites Negative NEG      Leukocyte Esterase TRACE (A) NEG     URINE MICROSCOPIC ONLY    Collection Time: 11/23/22  3:10 AM   Result Value Ref Range    WBC 0 to 3 0 - 4 /hpf    RBC 4 to 10 0 - 5 /hpf    Epithelial cells 2+ 0 - 5 /lpf    Bacteria 1+ (A) NEG /hpf       Radiologic Studies -   CT HEAD WO CONT    (Results Pending)         Medical Decision Making   I am the first provider for this patient. I reviewed the vital signs, available nursing notes, past medical history, past surgical history, family history and social history. Vital Signs-Reviewed the patient's vital signs. EKG: interpreted by me    Records Reviewed: Previous electrocardiograms (Time of Review: 4:07 AM)    ED Course: Progress Notes, Reevaluation, and Consults:    Patient developeds sudden onset of being anxious and having a vague left side chest pain. Patient treated with IV torodol and IV ativan. Provider Notes (Medical Decision Making): acute grief reaction, vasovagal syncope, MI    MDM      Procedures    Re-assessed patient at 5:12 am.  Patient asymptomatic. Patient findings were discussed with the patient and my opian that these symptoms are grief reactions. Patent agreed with my diagnosis and state she is feeling back to normal after receiving the ativan. Diagnosis     Clinical Impression: acute grief reaction    Disposition: Patient discharged home. F/U PCP in 24 hours. Ativan 1 mg po 6 h prn anxiety. Return to ER prn. Follow-up Information    None          Patient's Medications   Start Taking    No medications on file   Continue Taking    IBUPROFEN (MOTRIN) 600 MG TABLET    Take 1 Tablet by mouth every six (6) hours as needed for Pain. These Medications have changed    No medications on file   Stop Taking    No medications on file     Disclaimer: Sections of this note are dictated using utilizing voice recognition software. Minor typographical errors may be present. If questions arise, please do not hesitate to contact me or call our department.

## 2023-04-28 ENCOUNTER — APPOINTMENT (OUTPATIENT)
Facility: HOSPITAL | Age: 43
End: 2023-04-28
Payer: MEDICAID

## 2023-04-28 ENCOUNTER — HOSPITAL ENCOUNTER (EMERGENCY)
Facility: HOSPITAL | Age: 43
Discharge: HOME | End: 2023-04-28
Attending: STUDENT IN AN ORGANIZED HEALTH CARE EDUCATION/TRAINING PROGRAM
Payer: MEDICAID

## 2023-04-28 VITALS
SYSTOLIC BLOOD PRESSURE: 131 MMHG | BODY MASS INDEX: 28.79 KG/M2 | HEIGHT: 68 IN | WEIGHT: 190 LBS | HEART RATE: 70 BPM | OXYGEN SATURATION: 100 % | TEMPERATURE: 97.7 F | DIASTOLIC BLOOD PRESSURE: 66 MMHG | RESPIRATION RATE: 18 BRPM

## 2023-04-28 DIAGNOSIS — S99.922A INJURY OF LEFT FOOT, INITIAL ENCOUNTER: Primary | ICD-10-CM

## 2023-04-28 PROCEDURE — 73630 X-RAY EXAM OF FOOT: CPT

## 2023-04-28 PROCEDURE — 99283 EMERGENCY DEPT VISIT LOW MDM: CPT | Performed by: STUDENT IN AN ORGANIZED HEALTH CARE EDUCATION/TRAINING PROGRAM

## 2023-04-28 ASSESSMENT — ENCOUNTER SYMPTOMS
ABDOMINAL PAIN: 0
CHEST TIGHTNESS: 0
VOMITING: 0
DIARRHEA: 0
SHORTNESS OF BREATH: 0
NAUSEA: 0

## 2023-04-28 NOTE — ED TRIAGE NOTES
Pt c/o of left big toe pain 1X. Pt states \"I was at work and pulling the pallet steve and it fell on my foot. \"

## 2023-04-28 NOTE — ED NOTES
Discharge instructions reviewed with patient. Patient verbalized understanding. Patient advised to follow up as directed on discharge instructions. Patient denies questions, needs or concerns at this time. Patient verbalized understanding. No s/sx of distress noted.         Inez King RN  04/28/23 2748

## 2023-04-28 NOTE — ED NOTES
EMERGENCY DEPARTMENT HISTORY AND PHYSICAL EXAM      Date: 4/28/2023  Patient Name: Tomás Payne    History of Presenting Illness     Chief Complaint   Patient presents with    Foot Pain     Left foot       60-year-old female presenting to the emergency department complaint of left foot pain. Patient is employed at home. She had a pallet landed on her left foot. States that she is having pain across the MTP of the left foot and toes. She is still able to ambulate and move her toes. No other injuries. No other complaints        PCP: No primary care provider on file. No current facility-administered medications for this encounter. Current Outpatient Medications   Medication Sig Dispense Refill    ibuprofen (ADVIL;MOTRIN) 600 MG tablet Take 600 mg by mouth every 6 hours as needed (Patient not taking: Reported on 4/28/2023)      LORazepam (ATIVAN) 0.5 MG tablet Take 0.5 mg by mouth every 6 hours as needed. (Patient not taking: Reported on 4/28/2023)         Past History     Past Medical History:  Past Medical History:   Diagnosis Date    Seizures ([facility] [address]     Syphilis affecting pregnancy        Past Surgical History:  Past Surgical History:   Procedure Laterality Date    DILATION AND CURETTAGE OF UTERUS      For TABx2       Family History:  Family History   Problem Relation Age of Onset    Diabetes Sister 27    Hypertension Sister 34    Cancer Maternal Grandmother         breast cancer, also great grandmother and great aunts    Hypertension Maternal Grandmother        Social History:  Social History     Tobacco Use    Smoking status: Never    Smokeless tobacco: Never   Substance Use Topics    Alcohol use: No    Drug use: No     Types: Marijuana (Weed)       Allergies:  No Known Allergies      Review of Systems       Review of Systems   Constitutional:  Negative for activity change, fatigue and fever. Respiratory:  Negative for chest tightness and shortness of breath.     Cardiovascular:  Negative for chest

## 2023-07-13 ENCOUNTER — APPOINTMENT (OUTPATIENT)
Facility: HOSPITAL | Age: 43
End: 2023-07-13
Payer: MEDICAID

## 2023-07-13 ENCOUNTER — HOSPITAL ENCOUNTER (EMERGENCY)
Facility: HOSPITAL | Age: 43
Discharge: HOME | End: 2023-07-13
Attending: EMERGENCY MEDICINE
Payer: MEDICAID

## 2023-07-13 VITALS
DIASTOLIC BLOOD PRESSURE: 72 MMHG | TEMPERATURE: 97.9 F | HEIGHT: 68 IN | WEIGHT: 210 LBS | HEART RATE: 75 BPM | OXYGEN SATURATION: 100 % | SYSTOLIC BLOOD PRESSURE: 104 MMHG | RESPIRATION RATE: 18 BRPM | BODY MASS INDEX: 31.83 KG/M2

## 2023-07-13 DIAGNOSIS — S39.012A BACK STRAIN, INITIAL ENCOUNTER: Primary | ICD-10-CM

## 2023-07-13 DIAGNOSIS — M54.40 ACUTE MIDLINE LOW BACK PAIN WITH SCIATICA, SCIATICA LATERALITY UNSPECIFIED: ICD-10-CM

## 2023-07-13 PROCEDURE — 99284 EMERGENCY DEPT VISIT MOD MDM: CPT

## 2023-07-13 PROCEDURE — 6360000002 HC RX W HCPCS: Performed by: EMERGENCY MEDICINE

## 2023-07-13 PROCEDURE — 96372 THER/PROPH/DIAG INJ SC/IM: CPT

## 2023-07-13 PROCEDURE — 72070 X-RAY EXAM THORAC SPINE 2VWS: CPT

## 2023-07-13 PROCEDURE — 72100 X-RAY EXAM L-S SPINE 2/3 VWS: CPT

## 2023-07-13 RX ORDER — OXYCODONE HYDROCHLORIDE AND ACETAMINOPHEN 5; 325 MG/1; MG/1
1 TABLET ORAL EVERY 6 HOURS PRN
Qty: 12 TABLET | Refills: 0 | Status: SHIPPED | OUTPATIENT
Start: 2023-07-13 | End: 2023-07-16

## 2023-07-13 RX ORDER — IBUPROFEN 600 MG/1
600 TABLET ORAL 3 TIMES DAILY PRN
Qty: 30 TABLET | Refills: 0 | Status: SHIPPED | OUTPATIENT
Start: 2023-07-13

## 2023-07-13 RX ORDER — MORPHINE SULFATE 4 MG/ML
4 INJECTION, SOLUTION INTRAMUSCULAR; INTRAVENOUS
Status: COMPLETED | OUTPATIENT
Start: 2023-07-13 | End: 2023-07-13

## 2023-07-13 RX ADMIN — MORPHINE SULFATE 4 MG: 4 INJECTION, SOLUTION INTRAMUSCULAR; INTRAVENOUS at 06:08

## 2023-07-13 ASSESSMENT — LIFESTYLE VARIABLES
HOW MANY STANDARD DRINKS CONTAINING ALCOHOL DO YOU HAVE ON A TYPICAL DAY: PATIENT DOES NOT DRINK
HOW OFTEN DO YOU HAVE A DRINK CONTAINING ALCOHOL: NEVER

## 2023-07-13 ASSESSMENT — PAIN SCALES - GENERAL: PAINLEVEL_OUTOF10: 10

## 2023-07-13 NOTE — ED TRIAGE NOTES
Pt c/o of left lower back pain for 30 minutes. Pt states \"I work at [employer] and I was pulling the . \"

## 2023-07-13 NOTE — ED NOTES
Pt reports she is feeling better and denies pain at this time.  Pt is sitting up at bedside in wheelchair     [place]  07/13/23 6220

## 2023-07-17 ENCOUNTER — NURSE TRIAGE (OUTPATIENT)
Dept: OTHER | Facility: CLINIC | Age: 43
End: 2023-07-17

## 2023-07-17 NOTE — TELEPHONE ENCOUNTER
provided to the ECC. Please do not respond through this encounter as the response is not directed to a shared pool.     Reason for Disposition   [1] SEVERE back pain (e.g., excruciating, unable to do any normal activities) AND [2] not improved 2 hours after pain medicine    Protocols used: Back Pain-ADULT-AH

## 2024-01-31 NOTE — MR AVS SNAPSHOT
[address] [place] [address]. [facility] [address] [place] [address] 
[phone] Patient: Keren Keene MRN: [id number] MAGGIE:[birth year] Visit Information Date & Time Provider Department Dept. Phone Encounter #  
 7/13/2018  3:30 PM Bruce Gallegosmozayda [id number] Follow-up Instructions Return in 1 week (on 7/20/2018), or if symptoms worsen or fail to improve. Upcoming Health Maintenance Date Due  
 PAP AKA CERVICAL CYTOLOGY 6/4/2003 Influenza Age 5 to Adult 8/1/2018 Allergies as of 7/13/2018  Review Complete On: 7/13/2018 By: Kendrick Bennett MD  
 No Known Allergies Current Immunizations  Reviewed on 5/23/2018 Name Date Influenza Vaccine Split 10/11/2010 Tdap 5/23/2018 Not reviewed this visit You Were Diagnosed With   
  
 Codes Comments Encounter for supervision of other normal pregnancy in third trimester    -  Primary ICD-10-CM: Z34.83 ICD-9-CM: V22.1 Vitals BP Pulse Temp Resp Height(growth percentile) Weight(growth percentile) 122/84 (BP 1 Location: Left arm, BP Patient Position: Sitting) 88 97.2 °F (36.2 °C) (Oral) 18 5' 8\" (1.727 m) 183 lb (83 kg) LMP SpO2 BMI OB Status Smoking Status 10/06/2017 (Approximate) 100% 27.83 kg/m2 Pregnant Never Smoker BMI and BSA Data Body Mass Index Body Surface Area  
 27.83 kg/m 2 2 m 2 Preferred Pharmacy Pharmacy Name Phone [id number] [address] [phone] Your Updated Medication List  
  
   
This list is accurate as of 7/13/18  5:49 PM.  Always use your most recent med list.  
  
  
  
  
 acetaminophen 325 mg tablet Commonly known as:  TYLENOL Take 2 Tabs by mouth every four (4) hours as needed for Pain. BENADRYL 25 mg capsule Generic drug:  diphenhydrAMINE Take 25 mg by mouth every six (6) hours as needed. metroNIDAZOLE 0.75 % vaginal gel Commonly known as:  Maryana Walker Insert 1 Applicator into vagina nightly for 5 days. prenatal vit-calcium-iron-fa 27 mg iron- 1 mg Tab Commonly known as:  PRENATAL PLUS with CALCIUM Take 1 Tab by mouth daily. Follow-up Instructions Return in 1 week (on 7/20/2018), or if symptoms worsen or fail to improve. Patient Instructions Week 37 of Your Pregnancy: Care Instructions Your Care Instructions You are near the end of your pregnancy-and you're probably pretty uncomfortable. It may be harder to walk around. Lying down probably isn't comfortable either. You may have trouble getting to sleep or staying asleep. Most women deliver their babies between 40 and 41 weeks. This is a good time to think about packing a bag for the hospital with items you'll need. Then you'll be ready when labor starts. Follow-up care is a key part of your treatment and safety. Be sure to make and go to all appointments, and call your doctor if you are having problems. It's also a good idea to know your test results and keep a list of the medicines you take. How can you care for yourself at home? Learn about breastfeeding · Breastfeeding is best for your baby and good for you. · Breast milk has antibodies to help your baby fight infections. · Mothers who breastfeed often lose weight faster, because making milk burns calories. · Learning the best ways to hold your baby will make breastfeeding easier. · Let your partner bathe and diaper the baby to keep your partner from feeling left out. Snuggle together when you breastfeed. · You may want to learn how to use a breast pump and store your milk. · If you choose to bottle feed, make the feeding feel like breastfeeding so you can bond with your baby. Always hold your baby and the bottle. Do not prop bottles or let your baby fall asleep with a bottle. Learn about crying · It is common for babies to cry for 1 to 3 hours a day. Some cry more, some cry less. · Babies don't cry to make you upset or because you are a bad parent. · Crying is how your baby communicates. Your baby may be hungry; have gas; need a diaper change; or feel cold, warm, tired, lonely, or tense. Sometimes babies cry for unknown reasons. · If you respond to your baby's needs, he or she will learn to trust you. · Try to stay calm when your baby cries. Your baby may get more upset if he or she senses that you are upset. Know how to care for your  · Your baby's umbilical cord stump will drop off on its own, usually between 1 and 2 weeks. To care for your baby's umbilical cord area: ¨ Clean the area at the bottom of the cord 2 or 3 times a day. ¨ Pay special attention to the area where the cord attaches to the skin. ¨ Keep the diaper folded below the cord. ¨ Use a damp washcloth or cotton ball to sponge bathe your baby until the stump has come off. · Your baby's first dark stool is called meconium. After the meconium is passed, your baby will develop his or her own bowel pattern. ¨ Some babies, especially  babies, have several bowel movements a day. Others have one or two a day, or one every 2 to 3 days. ¨  babies often have loose, yellow stools. Formula-fed babies have more formed stools. ¨ If your baby's stools look like little pellets, he or she is constipated. After 2 days of constipation, call your baby's doctor. · If your baby will be circumcised, you can care for him at home. ¨ Gently rinse his penis with warm water after every diaper change. Do not try to remove the film that forms on the penis. This film will go away on its own. Pat dry. ¨ Put petroleum ointment, such as Vaseline, on the area of the diaper that will touch your baby's penis. This will keep the diaper from sticking to your baby. ¨ Ask the doctor about giving your baby acetaminophen (Tylenol) for pain. Where can you learn more? Go to http://inocencia-hank.info/. Enter 68 [phone] [id number] in the search box to learn more about \"Week 37 of Your Pregnancy: Care Instructions. \" Current as of: November 21, 2017 Content Version: 11.7 © 4256-3647 [employer]. Care instructions adapted under license by [employer] (which disclaims liability or warranty for this information). If you have questions about a medical condition or this instruction, always ask your healthcare professional. [address] any warranty or liability for your use of this information. Introducing \[facility] [place] [facility]\"" & HEALTH SERVICES! Jeisonatul Chang introduces [employer] patient portal. Now you can access parts of your medical record, email your doctor's office, and request medication refills online. 1. In your internet browser, go to [email or web address] 2. Click on the First Time User? Click Here link in the Sign In box. You will see the New Member Sign Up page. 3. Enter your [employer] Access Code exactly as it appears below. You will not need to use this code after youve completed the sign-up process. If you do not sign up before the expiration date, you must request a new code. · [employer] Access Code: [email or web address] Expires: 9/12/2018 10:37 AM 
 
4. Enter the last four digits of your Social Security Number (xxxx) and Date of Birth (mm/dd/yyyy) as indicated and click Submit. You will be taken to the next sign-up page. 5. Create a NewsFixedt ID. This will be your [employer] login ID and cannot be changed, so think of one that is secure and easy to remember. 6. Create a [employer] password. You can change your password at any time. 7. Enter your Password Reset Question and Answer. This can be used at a later time if you forget your password. 8. Enter your e-mail address. You will receive e-mail notification when new information is available in [address] 9. Click Sign Up. You can now view and download portions of your medical record. 10. Click the Download Summary menu link to download a portable copy of your medical information. If you have questions, please visit the Frequently Asked Questions section of the [employer] website. Remember, [employer] is NOT to be used for urgent needs. For medical emergencies, dial 911. Now available from your iPhone and Android! Please provide this summary of care documentation to your next provider. Your primary care clinician is listed as [address]. If you have any questions after today's visit, please call [phone]. DISCHARGE

## 2024-11-16 ENCOUNTER — HOSPITAL ENCOUNTER (EMERGENCY)
Facility: HOSPITAL | Age: 44
Discharge: HOME | End: 2024-11-16
Attending: EMERGENCY MEDICINE
Payer: MEDICAID

## 2024-11-16 VITALS
WEIGHT: 203 LBS | RESPIRATION RATE: 18 BRPM | SYSTOLIC BLOOD PRESSURE: 126 MMHG | OXYGEN SATURATION: 100 % | DIASTOLIC BLOOD PRESSURE: 88 MMHG | BODY MASS INDEX: 30.77 KG/M2 | HEIGHT: 68 IN | TEMPERATURE: 98.5 F | HEART RATE: 74 BPM

## 2024-11-16 DIAGNOSIS — K08.89 PAIN, DENTAL: Primary | ICD-10-CM

## 2024-11-16 PROCEDURE — 6370000000 HC RX 637 (ALT 250 FOR IP): Performed by: EMERGENCY MEDICINE

## 2024-11-16 PROCEDURE — 99283 EMERGENCY DEPT VISIT LOW MDM: CPT

## 2024-11-16 RX ORDER — OXYCODONE AND ACETAMINOPHEN 5; 325 MG/1; MG/1
1 TABLET ORAL EVERY 6 HOURS PRN
Qty: 12 TABLET | Refills: 0 | Status: ON HOLD | OUTPATIENT
Start: 2024-11-16 | End: 2024-11-21 | Stop reason: HOSPADM

## 2024-11-16 RX ORDER — OXYCODONE AND ACETAMINOPHEN 5; 325 MG/1; MG/1
1 TABLET ORAL
Status: COMPLETED | OUTPATIENT
Start: 2024-11-16 | End: 2024-11-16

## 2024-11-16 RX ORDER — AMOXICILLIN 250 MG/1
500 CAPSULE ORAL
Status: COMPLETED | OUTPATIENT
Start: 2024-11-16 | End: 2024-11-16

## 2024-11-16 RX ORDER — AMOXICILLIN 500 MG/1
500 CAPSULE ORAL 3 TIMES DAILY
Qty: 30 CAPSULE | Refills: 0 | Status: ON HOLD | OUTPATIENT
Start: 2024-11-16 | End: 2024-11-19

## 2024-11-16 RX ADMIN — AMOXICILLIN 500 MG: 250 CAPSULE ORAL at 23:39

## 2024-11-16 RX ADMIN — OXYCODONE HYDROCHLORIDE AND ACETAMINOPHEN 1 TABLET: 5; 325 TABLET ORAL at 23:39

## 2024-11-16 ASSESSMENT — ENCOUNTER SYMPTOMS
RESPIRATORY NEGATIVE: 1
SORE THROAT: 0
TROUBLE SWALLOWING: 0
GASTROINTESTINAL NEGATIVE: 1

## 2024-11-16 ASSESSMENT — LIFESTYLE VARIABLES
HOW OFTEN DO YOU HAVE A DRINK CONTAINING ALCOHOL: NEVER
HOW MANY STANDARD DRINKS CONTAINING ALCOHOL DO YOU HAVE ON A TYPICAL DAY: PATIENT DOES NOT DRINK

## 2024-11-16 ASSESSMENT — PAIN DESCRIPTION - LOCATION
LOCATION: TEETH
LOCATION: TEETH

## 2024-11-16 ASSESSMENT — PAIN - FUNCTIONAL ASSESSMENT: PAIN_FUNCTIONAL_ASSESSMENT: 0-10

## 2024-11-16 ASSESSMENT — PAIN DESCRIPTION - ORIENTATION
ORIENTATION: POSTERIOR;LOWER;RIGHT
ORIENTATION: RIGHT;LOWER;POSTERIOR

## 2024-11-16 ASSESSMENT — PAIN DESCRIPTION - DESCRIPTORS: DESCRIPTORS: ACHING;THROBBING

## 2024-11-16 ASSESSMENT — PAIN SCALES - GENERAL
PAINLEVEL_OUTOF10: 10
PAINLEVEL_OUTOF10: 10

## 2024-11-17 NOTE — ED PROVIDER NOTES
`[facility] EMERGENCY DEPT  eMERGENCY dEPARTMENT eNCOUnter      Pt Name: Shaila Martinez  MRN: [id number]  Birthdate 1980 of evaluation: 11/16/2024  Provider:Danielito Soriano MD    CHIEF COMPLAINT       HPI    Shaila Martinez is a 44 y.o. female  c/o having severe toothache that started today. No fever, chills, sore throat    ROS    Review of Systems   Constitutional:  Negative for activity change and fever.   HENT:  Positive for dental problem. Negative for sore throat and trouble swallowing.    Respiratory: Negative.     Cardiovascular: Negative.    Gastrointestinal: Negative.    Musculoskeletal: Negative.    Neurological: Negative.    All other systems reviewed and are negative.      Except as noted above the remainder of the review of systems was reviewed and negative.       PAST MEDICAL HISTORY     Past Medical History:   Diagnosis Date    Seizures (HCC)     Syphilis affecting pregnancy          SURGICAL HISTORY       Past Surgical History:   Procedure Laterality Date    DILATION AND CURETTAGE OF UTERUS      For TABx2         CURRENTMEDICATIONS       Previous Medications    No medications on file       ALLERGIES     Patient has no known allergies.    FAMILY HISTORY       Family History   Problem Relation Age of Onset    Diabetes Sister 30    Hypertension Sister 29    Cancer Maternal Grandmother         breast cancer, also great grandmother and great aunts    Hypertension Maternal Grandmother           SOCIAL HISTORY       Social History     Socioeconomic History    Marital status: Single     Spouse name: None    Number of children: None    Years of education: None    Highest education level: None   Tobacco Use    Smoking status: Never     Passive exposure: Never    Smokeless tobacco: Never   Substance and Sexual Activity    Alcohol use: No    Drug use: No     Types: Marijuana (Weed)     Social Determinants of Health     Physical Activity: Sufficiently Active (7/24/2023)    Exercise Vital Sign     Days of Exercise per  Week: 5 days     Minutes of Exercise per Session: 150+ min   Intimate Partner Violence: Not At Risk (7/24/2023)    Humiliation, Afraid, Rape, and Kick questionnaire     Fear of Current or Ex-Partner: No     Emotionally Abused: No     Physically Abused: No     Sexually Abused: No         PHYSICAL EXAM       ED Triage Vitals [11/16/24 2300]   BP Systolic BP Percentile Diastolic BP Percentile Temp Temp Source Pulse Respirations SpO2   126/88 -- -- 98.5 °F (36.9 °C) Oral 74 18 100 %      Height Weight - Scale         1.727 m (5' 8\") 92.1 kg (203 lb)             Physical Exam  Constitutional:       General: She is in acute distress.   HENT:      Head: Normocephalic and atraumatic.      Mouth/Throat:      Comments:   Tooth # 32: tender to palpation.  Normal mastication, throat - patent  Eyes:      Pupils: Pupils are equal, round, and reactive to light.   Cardiovascular:      Rate and Rhythm: Normal rate and regular rhythm.   Pulmonary:      Effort: Pulmonary effort is normal.      Breath sounds: Normal breath sounds.   Musculoskeletal:      Cervical back: Normal range of motion and neck supple.   Neurological:      General: No focal deficit present.      Mental Status: She is alert and oriented to person, place, and time.         No results found for this or any previous visit (from the past 24 hour(s)).          PROCEDURES:      EMERGENCY DEPARTMENT COURSE and DIFFERENTIALDIAGNOSIS/ MDM:   Vitals:    Vitals:    11/16/24 2300   BP: 126/88   Pulse: 74   Resp: 18   Temp: 98.5 °F (36.9 °C)   TempSrc: Oral   SpO2: 100%   Weight: 92.1 kg (203 lb)   Height: 1.727 m (5' 8\")       MDM  Number of Diagnoses or Management Options  Pain, dental  Diagnosis management comments: Differential diagnosis: Toothache, gingivitis, fractured tooth,    Risk of Complications, Morbidity, and/or Mortality  Presenting problems: low  Diagnostic procedures: low  Management options: low  General comments: Patient symptom treated with Percocet and

## 2024-11-17 NOTE — ED NOTES
I have reviewed discharge instructions with the patient.  The patient verbalized understanding.  Discharge medications reviewed with the patient and appropriate educational materials and side effects teaching were provided.     No need to  new medication.  Patient can continue taking the other antibiotic that was prescribed to her in the hospital. Thank you

## 2024-11-18 ENCOUNTER — APPOINTMENT (OUTPATIENT)
Facility: HOSPITAL | Age: 44
End: 2024-11-18
Payer: MEDICAID

## 2024-11-18 ENCOUNTER — HOSPITAL ENCOUNTER (INPATIENT)
Facility: HOSPITAL | Age: 44
LOS: 3 days | Discharge: NURSING, REHAB OR LONG-TERM CARE | End: 2024-11-21
Attending: STUDENT IN AN ORGANIZED HEALTH CARE EDUCATION/TRAINING PROGRAM | Admitting: STUDENT IN AN ORGANIZED HEALTH CARE EDUCATION/TRAINING PROGRAM
Payer: MEDICAID

## 2024-11-18 DIAGNOSIS — L02.91 PHLEGMON: ICD-10-CM

## 2024-11-18 DIAGNOSIS — K02.9 DENTAL CARIES: ICD-10-CM

## 2024-11-18 DIAGNOSIS — I63.9 ISCHEMIC STROKE (HCC): Primary | ICD-10-CM

## 2024-11-18 DIAGNOSIS — K04.7 DENTAL INFECTION: ICD-10-CM

## 2024-11-18 PROBLEM — R29.90 STROKE-LIKE SYMPTOM: Status: ACTIVE | Noted: 2024-11-18

## 2024-11-18 LAB
ALBUMIN SERPL-MCNC: 3.7 G/DL (ref 3.4–5)
ALBUMIN/GLOB SERPL: 1 (ref 0.8–1.7)
ALP SERPL-CCNC: 95 U/L (ref 45–117)
ALT SERPL-CCNC: 23 U/L (ref 13–56)
AMPHET UR QL SCN: NEGATIVE
ANION GAP SERPL CALC-SCNC: 9 MMOL/L (ref 3–18)
APPEARANCE UR: CLEAR
AST SERPL-CCNC: 16 U/L (ref 10–38)
BACTERIA URNS QL MICRO: NEGATIVE /HPF
BARBITURATES UR QL SCN: NEGATIVE
BASOPHILS # BLD: 0 K/UL (ref 0–0.1)
BASOPHILS NFR BLD: 0 % (ref 0–2)
BENZODIAZ UR QL: NEGATIVE
BILIRUB SERPL-MCNC: 0.6 MG/DL (ref 0.2–1)
BILIRUB UR QL: NEGATIVE
BUN SERPL-MCNC: 5 MG/DL (ref 7–18)
BUN/CREAT SERPL: 6 (ref 12–20)
CALCIUM SERPL-MCNC: 8.7 MG/DL (ref 8.5–10.1)
CANNABINOIDS UR QL SCN: POSITIVE
CHLORIDE SERPL-SCNC: 105 MMOL/L (ref 100–111)
CO2 SERPL-SCNC: 25 MMOL/L (ref 21–32)
COCAINE UR QL SCN: NEGATIVE
COLOR UR: YELLOW
CREAT SERPL-MCNC: 0.89 MG/DL (ref 0.6–1.3)
DIFFERENTIAL METHOD BLD: NORMAL
EKG ATRIAL RATE: 81 BPM
EKG DIAGNOSIS: NORMAL
EKG P AXIS: -12 DEGREES
EKG P-R INTERVAL: 150 MS
EKG Q-T INTERVAL: 348 MS
EKG QRS DURATION: 66 MS
EKG QTC CALCULATION (BAZETT): 404 MS
EKG R AXIS: 26 DEGREES
EKG T AXIS: 42 DEGREES
EKG VENTRICULAR RATE: 81 BPM
EOSINOPHIL # BLD: 0.3 K/UL (ref 0–0.4)
EOSINOPHIL NFR BLD: 3 % (ref 0–5)
EPITH CASTS URNS QL MICRO: NORMAL /LPF (ref 0–5)
ERYTHROCYTE [DISTWIDTH] IN BLOOD BY AUTOMATED COUNT: 13 % (ref 11.6–14.5)
GLOBULIN SER CALC-MCNC: 3.6 G/DL (ref 2–4)
GLUCOSE BLD STRIP.AUTO-MCNC: 110 MG/DL (ref 70–110)
GLUCOSE SERPL-MCNC: 104 MG/DL (ref 74–99)
GLUCOSE UR STRIP.AUTO-MCNC: NEGATIVE MG/DL
HCG SERPL QL: NEGATIVE
HCT VFR BLD AUTO: 38.2 % (ref 35–45)
HGB BLD-MCNC: 12.7 G/DL (ref 12–16)
HGB UR QL STRIP: ABNORMAL
IMM GRANULOCYTES # BLD AUTO: 0 K/UL (ref 0–0.04)
IMM GRANULOCYTES NFR BLD AUTO: 0 % (ref 0–0.5)
INR PPP: 1.1 (ref 0.9–1.1)
KETONES UR QL STRIP.AUTO: NEGATIVE MG/DL
LEUKOCYTE ESTERASE UR QL STRIP.AUTO: NEGATIVE
LYMPHOCYTES # BLD: 2.7 K/UL (ref 0.9–3.6)
LYMPHOCYTES NFR BLD: 25 % (ref 21–52)
Lab: ABNORMAL
MCH RBC QN AUTO: 28.8 PG (ref 24–34)
MCHC RBC AUTO-ENTMCNC: 33.2 G/DL (ref 31–37)
MCV RBC AUTO: 86.6 FL (ref 78–100)
METHADONE UR QL: NEGATIVE
MONOCYTES # BLD: 0.8 K/UL (ref 0.05–1.2)
MONOCYTES NFR BLD: 7 % (ref 3–10)
NEUTS SEG # BLD: 7.1 K/UL (ref 1.8–8)
NEUTS SEG NFR BLD: 65 % (ref 40–73)
NITRITE UR QL STRIP.AUTO: NEGATIVE
NRBC # BLD: 0 K/UL (ref 0–0.01)
NRBC BLD-RTO: 0 PER 100 WBC
OPIATES UR QL: POSITIVE
PCP UR QL: NEGATIVE
PH UR STRIP: 6 (ref 5–8)
PLATELET # BLD AUTO: 318 K/UL (ref 135–420)
PMV BLD AUTO: 10.2 FL (ref 9.2–11.8)
POTASSIUM SERPL-SCNC: 3.5 MMOL/L (ref 3.5–5.5)
PROT SERPL-MCNC: 7.3 G/DL (ref 6.4–8.2)
PROT UR STRIP-MCNC: NEGATIVE MG/DL
PROTHROMBIN TIME: 14.8 SEC (ref 11.9–14.9)
RBC # BLD AUTO: 4.41 M/UL (ref 4.2–5.3)
RBC #/AREA URNS HPF: NORMAL /HPF (ref 0–5)
SODIUM SERPL-SCNC: 139 MMOL/L (ref 136–145)
SP GR UR REFRACTOMETRY: >1.03 (ref 1–1.03)
TROPONIN I SERPL HS-MCNC: 4 NG/L (ref 0–54)
UROBILINOGEN UR QL STRIP.AUTO: 1 EU/DL (ref 0.2–1)
WBC # BLD AUTO: 10.9 K/UL (ref 4.6–13.2)
WBC URNS QL MICRO: NORMAL /HPF (ref 0–4)

## 2024-11-18 PROCEDURE — 70498 CT ANGIOGRAPHY NECK: CPT

## 2024-11-18 PROCEDURE — 96375 TX/PRO/DX INJ NEW DRUG ADDON: CPT

## 2024-11-18 PROCEDURE — 2580000003 HC RX 258: Performed by: EMERGENCY MEDICINE

## 2024-11-18 PROCEDURE — 70450 CT HEAD/BRAIN W/O DYE: CPT

## 2024-11-18 PROCEDURE — 1100000000 HC RM PRIVATE

## 2024-11-18 PROCEDURE — 82962 GLUCOSE BLOOD TEST: CPT

## 2024-11-18 PROCEDURE — 6370000000 HC RX 637 (ALT 250 FOR IP): Performed by: EMERGENCY MEDICINE

## 2024-11-18 PROCEDURE — 96366 THER/PROPH/DIAG IV INF ADDON: CPT

## 2024-11-18 PROCEDURE — 96365 THER/PROPH/DIAG IV INF INIT: CPT

## 2024-11-18 PROCEDURE — 80307 DRUG TEST PRSMV CHEM ANLYZR: CPT

## 2024-11-18 PROCEDURE — 84703 CHORIONIC GONADOTROPIN ASSAY: CPT

## 2024-11-18 PROCEDURE — 6360000002 HC RX W HCPCS: Performed by: EMERGENCY MEDICINE

## 2024-11-18 PROCEDURE — 99285 EMERGENCY DEPT VISIT HI MDM: CPT

## 2024-11-18 PROCEDURE — 2580000003 HC RX 258: Performed by: STUDENT IN AN ORGANIZED HEALTH CARE EDUCATION/TRAINING PROGRAM

## 2024-11-18 PROCEDURE — 6360000004 HC RX CONTRAST MEDICATION: Performed by: EMERGENCY MEDICINE

## 2024-11-18 PROCEDURE — 80053 COMPREHEN METABOLIC PANEL: CPT

## 2024-11-18 PROCEDURE — 93005 ELECTROCARDIOGRAM TRACING: CPT | Performed by: EMERGENCY MEDICINE

## 2024-11-18 PROCEDURE — 85610 PROTHROMBIN TIME: CPT

## 2024-11-18 PROCEDURE — 81001 URINALYSIS AUTO W/SCOPE: CPT

## 2024-11-18 PROCEDURE — 6360000002 HC RX W HCPCS: Performed by: STUDENT IN AN ORGANIZED HEALTH CARE EDUCATION/TRAINING PROGRAM

## 2024-11-18 PROCEDURE — 84484 ASSAY OF TROPONIN QUANT: CPT

## 2024-11-18 PROCEDURE — 85025 COMPLETE CBC W/AUTO DIFF WBC: CPT

## 2024-11-18 PROCEDURE — 70496 CT ANGIOGRAPHY HEAD: CPT

## 2024-11-18 PROCEDURE — 70491 CT SOFT TISSUE NECK W/DYE: CPT

## 2024-11-18 PROCEDURE — 93010 ELECTROCARDIOGRAM REPORT: CPT | Performed by: INTERNAL MEDICINE

## 2024-11-18 RX ORDER — IOPAMIDOL 755 MG/ML
80 INJECTION, SOLUTION INTRAVASCULAR
Status: COMPLETED | OUTPATIENT
Start: 2024-11-18 | End: 2024-11-18

## 2024-11-18 RX ORDER — CLINDAMYCIN PHOSPHATE 600 MG/50ML
600 INJECTION, SOLUTION INTRAVENOUS EVERY 8 HOURS
Status: DISCONTINUED | OUTPATIENT
Start: 2024-11-18 | End: 2024-11-19

## 2024-11-18 RX ORDER — MORPHINE SULFATE 2 MG/ML
2 INJECTION, SOLUTION INTRAMUSCULAR; INTRAVENOUS
Status: COMPLETED | OUTPATIENT
Start: 2024-11-18 | End: 2024-11-18

## 2024-11-18 RX ORDER — ASPIRIN 81 MG/1
81 TABLET, CHEWABLE ORAL
Status: COMPLETED | OUTPATIENT
Start: 2024-11-18 | End: 2024-11-18

## 2024-11-18 RX ORDER — 0.9 % SODIUM CHLORIDE 0.9 %
1000 INTRAVENOUS SOLUTION INTRAVENOUS ONCE
Status: COMPLETED | OUTPATIENT
Start: 2024-11-18 | End: 2024-11-18

## 2024-11-18 RX ORDER — MORPHINE SULFATE 2 MG/ML
2 INJECTION, SOLUTION INTRAMUSCULAR; INTRAVENOUS
Status: DISCONTINUED | OUTPATIENT
Start: 2024-11-18 | End: 2024-11-18

## 2024-11-18 RX ORDER — DEXAMETHASONE SODIUM PHOSPHATE 4 MG/ML
10 INJECTION, SOLUTION INTRA-ARTICULAR; INTRALESIONAL; INTRAMUSCULAR; INTRAVENOUS; SOFT TISSUE
Status: COMPLETED | OUTPATIENT
Start: 2024-11-18 | End: 2024-11-18

## 2024-11-18 RX ORDER — OXYCODONE AND ACETAMINOPHEN 5; 325 MG/1; MG/1
1 TABLET ORAL
Status: COMPLETED | OUTPATIENT
Start: 2024-11-18 | End: 2024-11-18

## 2024-11-18 RX ORDER — SODIUM CHLORIDE 9 MG/ML
INJECTION, SOLUTION INTRAVENOUS CONTINUOUS
OUTPATIENT
Start: 2024-11-18

## 2024-11-18 RX ORDER — IOPAMIDOL 612 MG/ML
80 INJECTION, SOLUTION INTRAVASCULAR
Status: COMPLETED | OUTPATIENT
Start: 2024-11-18 | End: 2024-11-18

## 2024-11-18 RX ORDER — KETOROLAC TROMETHAMINE 15 MG/ML
30 INJECTION, SOLUTION INTRAMUSCULAR; INTRAVENOUS
Status: COMPLETED | OUTPATIENT
Start: 2024-11-18 | End: 2024-11-18

## 2024-11-18 RX ADMIN — IOPAMIDOL 80 ML: 755 INJECTION, SOLUTION INTRAVENOUS at 15:49

## 2024-11-18 RX ADMIN — IOPAMIDOL 80 ML: 612 INJECTION, SOLUTION INTRAVENOUS at 14:39

## 2024-11-18 RX ADMIN — SODIUM CHLORIDE 1000 ML: 9 INJECTION, SOLUTION INTRAVENOUS at 15:22

## 2024-11-18 RX ADMIN — SODIUM CHLORIDE 3000 MG: 900 INJECTION INTRAVENOUS at 23:10

## 2024-11-18 RX ADMIN — OXYCODONE HYDROCHLORIDE AND ACETAMINOPHEN 1 TABLET: 5; 325 TABLET ORAL at 20:42

## 2024-11-18 RX ADMIN — ASPIRIN 81 MG CHEWABLE TABLET 81 MG: 81 TABLET CHEWABLE at 17:12

## 2024-11-18 RX ADMIN — CLINDAMYCIN PHOSPHATE 600 MG: 600 INJECTION, SOLUTION INTRAVENOUS at 19:13

## 2024-11-18 RX ADMIN — DEXAMETHASONE SODIUM PHOSPHATE 10 MG: 4 INJECTION, SOLUTION INTRAMUSCULAR; INTRAVENOUS at 19:19

## 2024-11-18 RX ADMIN — CLINDAMYCIN PHOSPHATE 900 MG: 150 INJECTION, SOLUTION INTRAVENOUS at 14:00

## 2024-11-18 RX ADMIN — MORPHINE SULFATE 2 MG: 2 INJECTION, SOLUTION INTRAMUSCULAR; INTRAVENOUS at 13:35

## 2024-11-18 RX ADMIN — KETOROLAC TROMETHAMINE 30 MG: 15 INJECTION, SOLUTION INTRAMUSCULAR; INTRAVENOUS at 13:35

## 2024-11-18 ASSESSMENT — PAIN DESCRIPTION - LOCATION
LOCATION: TEETH
LOCATION: TEETH
LOCATION: TEETH;FACE
LOCATION: TEETH
LOCATION: TEETH

## 2024-11-18 ASSESSMENT — PAIN - FUNCTIONAL ASSESSMENT
PAIN_FUNCTIONAL_ASSESSMENT: ACTIVITIES ARE NOT PREVENTED
PAIN_FUNCTIONAL_ASSESSMENT: 0-10

## 2024-11-18 ASSESSMENT — PAIN SCALES - GENERAL
PAINLEVEL_OUTOF10: 5
PAINLEVEL_OUTOF10: 0
PAINLEVEL_OUTOF10: 8
PAINLEVEL_OUTOF10: 10
PAINLEVEL_OUTOF10: 8

## 2024-11-18 ASSESSMENT — ENCOUNTER SYMPTOMS
RESPIRATORY NEGATIVE: 1
ALLERGIC/IMMUNOLOGIC NEGATIVE: 1
GASTROINTESTINAL NEGATIVE: 1
EYES NEGATIVE: 1
FACIAL SWELLING: 1

## 2024-11-18 ASSESSMENT — PAIN DESCRIPTION - ORIENTATION
ORIENTATION: RIGHT;LOWER
ORIENTATION: RIGHT
ORIENTATION: RIGHT

## 2024-11-18 ASSESSMENT — PAIN DESCRIPTION - DESCRIPTORS
DESCRIPTORS: ACHING
DESCRIPTORS: ACHING

## 2024-11-18 ASSESSMENT — PAIN DESCRIPTION - FREQUENCY: FREQUENCY: CONTINUOUS

## 2024-11-18 ASSESSMENT — PAIN DESCRIPTION - ONSET: ONSET: AWAKENED FROM SLEEP

## 2024-11-18 ASSESSMENT — PAIN DESCRIPTION - PAIN TYPE: TYPE: ACUTE PAIN

## 2024-11-18 NOTE — ED NOTES
Pt noted to be having right side numbness and difficulty standing. Ct tech reports having difficulty in CT. OBEY Salazar called to bedside

## 2024-11-18 NOTE — ED NOTES
At CT, tech was concerned about patient having tremors (concerned with seizure like activity). Patient was alert the whole time during said activity, lasted 2 seconds, and patient was not postictal.

## 2024-11-18 NOTE — ED PROVIDER NOTES
[facility] EMERGENCY DEPT  EMERGENCY DEPARTMENT ENCOUNTER      Pt Name: Shaila Martinez  MRN: [id number]  Birthdate 1980  Date of evaluation: 11/18/2024  Provider: OBEY James  9:34 PM    CHIEF COMPLAINT       Chief Complaint   Patient presents with    Dental Pain         HISTORY OF PRESENT ILLNESS    Shaila Martinez is a 44 y.o. female who presents to the emergency department with right lower jaw pain fever chills for the past several days.  Patient says she feels it going underneath her neck.  Having trouble opening her mouth.  She called [facility] but she cannot be seen until January 9.  Denies possibility of pregnancy.  Woke today with worsening symptoms.  Pt drove herself to the ED today.    HPI    Nursing Notes were reviewed.    REVIEW OF SYSTEMS       Review of Systems   Constitutional:  Positive for appetite change, chills and fever.   HENT:  Positive for dental problem and facial swelling.    Eyes: Negative.    Respiratory: Negative.     Cardiovascular: Negative.    Gastrointestinal: Negative.    Endocrine: Negative.    Genitourinary: Negative.    Musculoskeletal:  Positive for myalgias and neck pain.   Skin: Negative.    Allergic/Immunologic: Negative.    Neurological: Negative.    Hematological: Negative.    Psychiatric/Behavioral: Negative.         Except as noted above the remainder of the review of systems was reviewed and negative.       PAST MEDICAL HISTORY     Past Medical History:   Diagnosis Date    Seizures (HCC)     Syphilis affecting pregnancy          SURGICAL HISTORY       Past Surgical History:   Procedure Laterality Date    DILATION AND CURETTAGE OF UTERUS      For TABx2         CURRENT MEDICATIONS       Previous Medications    AMOXICILLIN (AMOXIL) 500 MG CAPSULE    Take 1 capsule by mouth 3 times daily for 10 days    DICLOFENAC (VOLTAREN) 50 MG EC TABLET        OXYCODONE-ACETAMINOPHEN (PERCOCET) 5-325 MG PER TABLET    Take 1 tablet by mouth every 6 hours as needed for Pain for up to 3  of 81 bpm;  QRS 66 and Qtc 404.  No MILAN, depressions, t wave inversions.    RADIOLOGY:   Non-plain film images such as CT, Ultrasound and MRI are read by the radiologist. Plain radiographic images are visualized and preliminarily interpreted by the emergency physician with the below findings:      Interpretation per the Radiologist below, if available at the time of this note:    CTA HEAD NECK W CONTRAST         CT HEAD WO CONTRAST   Final Result   No intracranial mass lesion or infarct is identified.      Electronically signed by Gonzalo Grullon      CT SOFT TISSUE NECK W CONTRAST   Final Result   Findings suggestive of a right buccal space phlegmon.   Lymphadenopathy.      The adenoids and to lesser extent the palatine tonsils are enlarged for the   patient's age.         Electronically signed by Gonzalo Grullon            ED BEDSIDE ULTRASOUND:   Performed by ED Physician - none    LABS:  Labs Reviewed   COMPREHENSIVE METABOLIC PANEL - Abnormal; Notable for the following components:       Result Value    Glucose 104 (*)     BUN 5 (*)     BUN/Creatinine Ratio 6 (*)     All other components within normal limits   URINALYSIS - Abnormal; Notable for the following components:    Specific Gravity, UA >1.030 (*)     Blood, Urine MODERATE (*)     All other components within normal limits   URINE DRUG SCREEN - Abnormal; Notable for the following components:    THC, TH-Cannabinol, Urine Positive (*)     Opiates, Urine Positive (*)     All other components within normal limits   CBC WITH AUTO DIFFERENTIAL   HCG, SERUM, QUALITATIVE   TROPONIN   PROTIME-INR   URINALYSIS, MICRO   CBC WITH AUTO DIFFERENTIAL   POCT GLUCOSE   POCT GLUCOSE       All other labs were within normal range or not returned as of this dictation.    EMERGENCY DEPARTMENT COURSE and DIFFERENTIAL DIAGNOSIS/MDM:   Vitals:    Vitals:    11/18/24 1830 11/18/24 1845 11/18/24 1850 11/18/24 2042   BP: (!) 131/96 (!) 147/90     Pulse: 75 72     Resp: 14 11 21

## 2024-11-18 NOTE — ED TRIAGE NOTES
Pt to ED for re evaluation of right lower dental pain/swelling. Pt seen Saturday for same and started amoxicillin, reports compliance with treatment. No relief from pain meds.

## 2024-11-19 ENCOUNTER — APPOINTMENT (OUTPATIENT)
Facility: HOSPITAL | Age: 44
End: 2024-11-19
Payer: MEDICAID

## 2024-11-19 PROBLEM — K04.7 DENTAL INFECTION: Status: ACTIVE | Noted: 2024-11-19

## 2024-11-19 LAB
ANION GAP SERPL CALC-SCNC: 7 MMOL/L (ref 3–18)
BUN SERPL-MCNC: 8 MG/DL (ref 7–18)
BUN/CREAT SERPL: 10 (ref 12–20)
CALCIUM SERPL-MCNC: 9 MG/DL (ref 8.5–10.1)
CHLORIDE SERPL-SCNC: 110 MMOL/L (ref 100–111)
CO2 SERPL-SCNC: 21 MMOL/L (ref 21–32)
CREAT SERPL-MCNC: 0.82 MG/DL (ref 0.6–1.3)
GLUCOSE BLD STRIP.AUTO-MCNC: 118 MG/DL (ref 70–110)
GLUCOSE BLD STRIP.AUTO-MCNC: 127 MG/DL (ref 70–110)
GLUCOSE BLD STRIP.AUTO-MCNC: 128 MG/DL (ref 70–110)
GLUCOSE SERPL-MCNC: 119 MG/DL (ref 74–99)
POTASSIUM SERPL-SCNC: 3.7 MMOL/L (ref 3.5–5.5)
SODIUM SERPL-SCNC: 138 MMOL/L (ref 136–145)

## 2024-11-19 PROCEDURE — 1100000003 HC PRIVATE W/ TELEMETRY

## 2024-11-19 PROCEDURE — 82962 GLUCOSE BLOOD TEST: CPT

## 2024-11-19 PROCEDURE — 97110 THERAPEUTIC EXERCISES: CPT

## 2024-11-19 PROCEDURE — 2580000003 HC RX 258: Performed by: STUDENT IN AN ORGANIZED HEALTH CARE EDUCATION/TRAINING PROGRAM

## 2024-11-19 PROCEDURE — 94761 N-INVAS EAR/PLS OXIMETRY MLT: CPT

## 2024-11-19 PROCEDURE — 6370000000 HC RX 637 (ALT 250 FOR IP): Performed by: INTERNAL MEDICINE

## 2024-11-19 PROCEDURE — 80048 BASIC METABOLIC PNL TOTAL CA: CPT

## 2024-11-19 PROCEDURE — A9577 INJ MULTIHANCE: HCPCS | Performed by: PSYCHIATRY & NEUROLOGY

## 2024-11-19 PROCEDURE — 97530 THERAPEUTIC ACTIVITIES: CPT

## 2024-11-19 PROCEDURE — 6360000002 HC RX W HCPCS: Performed by: STUDENT IN AN ORGANIZED HEALTH CARE EDUCATION/TRAINING PROGRAM

## 2024-11-19 PROCEDURE — 70551 MRI BRAIN STEM W/O DYE: CPT

## 2024-11-19 PROCEDURE — 97166 OT EVAL MOD COMPLEX 45 MIN: CPT

## 2024-11-19 PROCEDURE — 92526 ORAL FUNCTION THERAPY: CPT

## 2024-11-19 PROCEDURE — 6370000000 HC RX 637 (ALT 250 FOR IP): Performed by: STUDENT IN AN ORGANIZED HEALTH CARE EDUCATION/TRAINING PROGRAM

## 2024-11-19 PROCEDURE — 99223 1ST HOSP IP/OBS HIGH 75: CPT | Performed by: STUDENT IN AN ORGANIZED HEALTH CARE EDUCATION/TRAINING PROGRAM

## 2024-11-19 PROCEDURE — 97162 PT EVAL MOD COMPLEX 30 MIN: CPT

## 2024-11-19 PROCEDURE — 36415 COLL VENOUS BLD VENIPUNCTURE: CPT

## 2024-11-19 PROCEDURE — 92610 EVALUATE SWALLOWING FUNCTION: CPT

## 2024-11-19 PROCEDURE — 99223 1ST HOSP IP/OBS HIGH 75: CPT | Performed by: PSYCHIATRY & NEUROLOGY

## 2024-11-19 PROCEDURE — 6360000004 HC RX CONTRAST MEDICATION: Performed by: PSYCHIATRY & NEUROLOGY

## 2024-11-19 PROCEDURE — 70552 MRI BRAIN STEM W/DYE: CPT

## 2024-11-19 RX ORDER — SODIUM CHLORIDE 9 MG/ML
INJECTION, SOLUTION INTRAVENOUS PRN
Status: DISCONTINUED | OUTPATIENT
Start: 2024-11-19 | End: 2024-11-21 | Stop reason: HOSPADM

## 2024-11-19 RX ORDER — ASPIRIN 81 MG/1
81 TABLET, CHEWABLE ORAL DAILY
Status: DISCONTINUED | OUTPATIENT
Start: 2024-11-19 | End: 2024-11-21

## 2024-11-19 RX ORDER — ACETAMINOPHEN 650 MG/1
650 SUPPOSITORY RECTAL EVERY 4 HOURS PRN
Status: DISCONTINUED | OUTPATIENT
Start: 2024-11-19 | End: 2024-11-21 | Stop reason: HOSPADM

## 2024-11-19 RX ORDER — POLYETHYLENE GLYCOL 3350 17 G/17G
17 POWDER, FOR SOLUTION ORAL DAILY PRN
Status: DISCONTINUED | OUTPATIENT
Start: 2024-11-19 | End: 2024-11-21 | Stop reason: HOSPADM

## 2024-11-19 RX ORDER — SODIUM CHLORIDE 0.9 % (FLUSH) 0.9 %
5-40 SYRINGE (ML) INJECTION EVERY 12 HOURS SCHEDULED
Status: DISCONTINUED | OUTPATIENT
Start: 2024-11-19 | End: 2024-11-21 | Stop reason: HOSPADM

## 2024-11-19 RX ORDER — ATORVASTATIN CALCIUM 40 MG/1
80 TABLET, FILM COATED ORAL NIGHTLY
Status: DISCONTINUED | OUTPATIENT
Start: 2024-11-19 | End: 2024-11-21

## 2024-11-19 RX ORDER — ACETAMINOPHEN 325 MG/1
650 TABLET ORAL EVERY 4 HOURS PRN
Status: DISCONTINUED | OUTPATIENT
Start: 2024-11-19 | End: 2024-11-21 | Stop reason: HOSPADM

## 2024-11-19 RX ORDER — ONDANSETRON 2 MG/ML
4 INJECTION INTRAMUSCULAR; INTRAVENOUS EVERY 6 HOURS PRN
Status: DISCONTINUED | OUTPATIENT
Start: 2024-11-19 | End: 2024-11-21 | Stop reason: HOSPADM

## 2024-11-19 RX ORDER — ONDANSETRON 4 MG/1
4 TABLET, ORALLY DISINTEGRATING ORAL EVERY 8 HOURS PRN
Status: DISCONTINUED | OUTPATIENT
Start: 2024-11-19 | End: 2024-11-21 | Stop reason: HOSPADM

## 2024-11-19 RX ORDER — ASPIRIN 300 MG/1
300 SUPPOSITORY RECTAL DAILY
Status: DISCONTINUED | OUTPATIENT
Start: 2024-11-19 | End: 2024-11-20

## 2024-11-19 RX ORDER — LABETALOL HYDROCHLORIDE 5 MG/ML
10 INJECTION, SOLUTION INTRAVENOUS EVERY 10 MIN PRN
Status: DISCONTINUED | OUTPATIENT
Start: 2024-11-19 | End: 2024-11-21 | Stop reason: HOSPADM

## 2024-11-19 RX ORDER — OXYCODONE AND ACETAMINOPHEN 5; 325 MG/1; MG/1
2 TABLET ORAL EVERY 6 HOURS PRN
Status: DISCONTINUED | OUTPATIENT
Start: 2024-11-19 | End: 2024-11-21 | Stop reason: HOSPADM

## 2024-11-19 RX ORDER — SODIUM CHLORIDE 9 MG/ML
INJECTION, SOLUTION INTRAVENOUS CONTINUOUS
Status: DISCONTINUED | OUTPATIENT
Start: 2024-11-19 | End: 2024-11-20

## 2024-11-19 RX ORDER — SODIUM CHLORIDE 0.9 % (FLUSH) 0.9 %
5-40 SYRINGE (ML) INJECTION PRN
Status: DISCONTINUED | OUTPATIENT
Start: 2024-11-19 | End: 2024-11-21 | Stop reason: HOSPADM

## 2024-11-19 RX ORDER — ENOXAPARIN SODIUM 100 MG/ML
40 INJECTION SUBCUTANEOUS DAILY
Status: DISCONTINUED | OUTPATIENT
Start: 2024-11-19 | End: 2024-11-21 | Stop reason: HOSPADM

## 2024-11-19 RX ADMIN — ATORVASTATIN CALCIUM 80 MG: 40 TABLET, FILM COATED ORAL at 21:02

## 2024-11-19 RX ADMIN — SODIUM CHLORIDE 3000 MG: 900 INJECTION INTRAVENOUS at 10:57

## 2024-11-19 RX ADMIN — SODIUM CHLORIDE 3000 MG: 900 INJECTION INTRAVENOUS at 02:19

## 2024-11-19 RX ADMIN — SODIUM CHLORIDE, PRESERVATIVE FREE 5 ML: 5 INJECTION INTRAVENOUS at 21:05

## 2024-11-19 RX ADMIN — SODIUM CHLORIDE 3000 MG: 900 INJECTION INTRAVENOUS at 17:17

## 2024-11-19 RX ADMIN — ASPIRIN 81 MG CHEWABLE TABLET 81 MG: 81 TABLET CHEWABLE at 10:50

## 2024-11-19 RX ADMIN — GADOBENATE DIMEGLUMINE 20 ML: 529 INJECTION, SOLUTION INTRAVENOUS at 12:51

## 2024-11-19 RX ADMIN — ACETAMINOPHEN 325MG 650 MG: 325 TABLET ORAL at 06:18

## 2024-11-19 RX ADMIN — OXYCODONE HYDROCHLORIDE AND ACETAMINOPHEN 2 TABLET: 5; 325 TABLET ORAL at 23:05

## 2024-11-19 RX ADMIN — SODIUM CHLORIDE: 9 INJECTION, SOLUTION INTRAVENOUS at 23:08

## 2024-11-19 RX ADMIN — SODIUM CHLORIDE: 9 INJECTION, SOLUTION INTRAVENOUS at 10:55

## 2024-11-19 RX ADMIN — SODIUM CHLORIDE, PRESERVATIVE FREE 10 ML: 5 INJECTION INTRAVENOUS at 10:51

## 2024-11-19 RX ADMIN — OXYCODONE HYDROCHLORIDE AND ACETAMINOPHEN 2 TABLET: 5; 325 TABLET ORAL at 17:13

## 2024-11-19 RX ADMIN — SODIUM CHLORIDE: 9 INJECTION, SOLUTION INTRAVENOUS at 03:13

## 2024-11-19 RX ADMIN — CLINDAMYCIN PHOSPHATE 600 MG: 600 INJECTION, SOLUTION INTRAVENOUS at 04:03

## 2024-11-19 RX ADMIN — ENOXAPARIN SODIUM 40 MG: 100 INJECTION SUBCUTANEOUS at 10:50

## 2024-11-19 RX ADMIN — CLINDAMYCIN PHOSPHATE 600 MG: 600 INJECTION, SOLUTION INTRAVENOUS at 11:46

## 2024-11-19 RX ADMIN — OXYCODONE HYDROCHLORIDE AND ACETAMINOPHEN 2 TABLET: 5; 325 TABLET ORAL at 10:50

## 2024-11-19 RX ADMIN — SODIUM CHLORIDE 3000 MG: 900 INJECTION INTRAVENOUS at 23:10

## 2024-11-19 ASSESSMENT — PAIN SCALES - GENERAL
PAINLEVEL_OUTOF10: 10
PAINLEVEL_OUTOF10: 10
PAINLEVEL_OUTOF10: 9
PAINLEVEL_OUTOF10: 0
PAINLEVEL_OUTOF10: 5
PAINLEVEL_OUTOF10: 7
PAINLEVEL_OUTOF10: 8
PAINLEVEL_OUTOF10: 2

## 2024-11-19 ASSESSMENT — PAIN DESCRIPTION - PAIN TYPE
TYPE: ACUTE PAIN

## 2024-11-19 ASSESSMENT — PAIN DESCRIPTION - ORIENTATION
ORIENTATION: RIGHT;LOWER
ORIENTATION: RIGHT

## 2024-11-19 ASSESSMENT — PAIN - FUNCTIONAL ASSESSMENT
PAIN_FUNCTIONAL_ASSESSMENT: ACTIVITIES ARE NOT PREVENTED
PAIN_FUNCTIONAL_ASSESSMENT: PREVENTS OR INTERFERES SOME ACTIVE ACTIVITIES AND ADLS
PAIN_FUNCTIONAL_ASSESSMENT: PREVENTS OR INTERFERES SOME ACTIVE ACTIVITIES AND ADLS

## 2024-11-19 ASSESSMENT — PAIN DESCRIPTION - DESCRIPTORS
DESCRIPTORS: ACHING
DESCRIPTORS: ACHING;SHARP
DESCRIPTORS: ACHING

## 2024-11-19 ASSESSMENT — PAIN DESCRIPTION - ONSET
ONSET: ON-GOING

## 2024-11-19 ASSESSMENT — PAIN DESCRIPTION - FREQUENCY
FREQUENCY: CONTINUOUS

## 2024-11-19 ASSESSMENT — PAIN DESCRIPTION - LOCATION
LOCATION: MOUTH;TEETH
LOCATION: MOUTH;TEETH
LOCATION: JAW;TEETH;MOUTH
LOCATION: TEETH;JAW
LOCATION: MOUTH;TEETH

## 2024-11-19 NOTE — PROGRESS NOTES
Speech Pathology    Bedside orders received, however Pt is currently off the unit. Will follow up later this business date.     Thank you for this referral.    Tracee Shi M.S. CCC-SLP  Speech Language Pathologist

## 2024-11-19 NOTE — PLAN OF CARE
Problem: SLP Adult - Impaired Swallowing  Goal: By Discharge: Advance to least restrictive diet without signs or symptoms of aspiration for planned discharge setting.  See evaluation for individualized goals.  Description: Patient will:  1. Tolerate PO trials with 0 s/s overt distress in 4/5 trials  2. Utilize compensatory swallow strategies/maneuvers (decrease bite/sip, size/rate, alt. liq/sol) with min cues in 4/5 trials    Rec:     Full thin liquids  Aspiration precautions  HOB >45 during po intake, remain >30 for 30-45 minutes after po   Small bites/sips; alternate liquid/solid with slow feeding rate   Oral care TID  Meds per pt preference    Outcome: Progressing  SPEECH LANGUAGE PATHOLOGY BEDSIDE SWALLOW EVALUATION/TREATMENT    Patient: Shaila Martinez (44 y.o. female)  Date: 11/19/2024  Primary Diagnosis: Phlegmon [L02.91]  Dental caries [K02.9]  Stroke-like symptom [R29.90]  Ischemic stroke (HCC) [I63.9]       Precautions: Aspiration  PLOF: As per H&P  ASSESSMENT:  Based on the objective data described below, the patient presents with mild oral dysphagia c/b exteremly restricted mandible due to swelling/pain impacting mastication. Pt ultimately tolerated reg solids and thin liquids + straw consecutive swallows without overt s/s of aspiration. Pt attempted reg solids with prolong/delayed mastication and poor lingual strength to improve mastication. Functional laryngeal elevation to palpation with PO. Pt reports that she \"doesn't want to risk it with solids\" at this point and would like only liquids/soup. Recommend full thin liquids, aspiration precautions, oral care TID, and meds as tolerated. Will continue to follow.     TREATMENT:  Skilled therapy initiated; Educated patient on aspiration precautions and importance of compensatory swallow techniques to decrease aspiration risk (decrease rate of intake & sip size, upright @HOB for all po intake and ~30 minutes after po); verbalized comprehension.    Patient

## 2024-11-19 NOTE — CARE COORDINATION
11/19/24 1219   Service Assessment   Patient Orientation Alert and Oriented   Cognition Alert   History Provided By Patient   Primary Caregiver Self   Accompanied By/Relationship Lisbet mother   Support Systems Family Members;Children   Patient's Healthcare Decision Maker is: Patient Declined (Legal Next of Kin Remains as Decision Maker)   PCP Verified by CM Yes  (Needs PCP)   Prior Functional Level Independent in ADLs/IADLs   Current Functional Level Assistance with the following:;Mobility   Can patient return to prior living arrangement Yes   Ability to make needs known: Good   Family able to assist with home care needs: Yes   Would you like for me to discuss the discharge plan with any other family members/significant others, and if so, who? Yes  (Dayana Martinez- mother)   Financial Resources Medicaid   Community Resources None   CM/SW Referral Other (see comment)  (not applicable)   Social/Functional History   Lives With Family   Type of Home House   Home Layout Two level   Home Access Stairs to enter with rails   Entrance Stairs - Number of Steps 1   Entrance Stairs - Rails None   Bathroom Shower/Tub Tub/Shower unit   Bathroom Toilet Standard   Bathroom Equipment None   Bathroom Accessibility Accessible   Home Equipment None   Receives Help From Family   ADL Assistance Independent   Homemaking Assistance Independent   Homemaking Responsibilities Yes   Ambulation Assistance Independent   Transfer Assistance Independent   Active  Yes   Mode of Transportation Car   Education   (not applicable)   Occupation Full time employment   Type of Occupation Manager at [employer]   Discharge Planning   Type of Residence Other (Comment)  ([facility])   Living Arrangements Children   Current Services Prior To Admission None   Potential Assistance Needed N/A   DME Ordered? No   Potential Assistance Purchasing Medications Yes   Type of Home Care Services None   Patient expects to be discharged to: Acute rehab   Follow Up

## 2024-11-19 NOTE — PLAN OF CARE
Problem: Physical Therapy - Adult  Goal: By Discharge: Performs mobility at highest level of function for planned discharge setting.  See evaluation for individualized goals.  Description: Physical Therapy Goals  Initiated 11/19/2024 and to be accomplished within 7 day(s)  1.  Patient will move from supine to sit and sit to supine in bed with supervision/set-up in preparation for seated tasks.    2.  Patient will transfer from bed to chair and chair to bed with minimal assistance using the least restrictive device in preparation for out of bed.  3.  Patient will perform sit to stand with minimal assistance in preparation for out of bed tasks.  4.  Patient will ambulate with minimal assistance for 25 feet with the least restrictive device in preparation for home setting.   5.  Patient will ascend/descend 3 stairs with handrail(s) with moderate assistance in preparation for negotiation of home set-up.    PLOF: Lives with family. Two story home. Independent.  Outcome: Progressing   PHYSICAL THERAPY EVALUATION    Patient: Shaila Martinez (44 y.o. female)  Date: 11/19/2024  Primary Diagnosis: Phlegmon [L02.91]  Dental caries [K02.9]  Stroke-like symptom [R29.90]  Ischemic stroke (HCC) [I63.9]  Precautions: Fall Risk  ASSESSMENT :  Mod A for supine to sit. Seated EOB with good balance. LLE AROM WFL, strength decreased 3/5 grossly. RLE AROM 25% of full, strength 2/5 grossly. Mod A for sit to stand. Two trials of standing. Poor standing balance with posterior lean and hyperextended hips. Right knee blocked with standing. Decreased weight shift to right in standing. Poor safety awareness. No active cervical range of motion; encouraged to complete within pain limits. Decreased eye contact; denies visual field loss. Lateral scoot at EOB to left with poor safety awareness to prep for lateral transfer. Returned to seated in bed. Seated in bed with HOB elevated. Educated on need for RN assistance with mobility; verbalized

## 2024-11-19 NOTE — PLAN OF CARE
Problem: Safety - Adult  Goal: Free from fall injury  Description: Fall precautions in place. Family at bedside  11/19/2024 1359 by Angie Mercado RN  Outcome: Progressing  11/19/2024 0229 by Emily Naylor RN  Outcome: Progressing  Flowsheets  Taken 11/19/2024 0229  Free From Fall Injury: Instruct family/caregiver on patient safety  Taken 11/19/2024 0145  Free From Fall Injury: Instruct family/caregiver on patient safety     Problem: Pain  Goal: Verbalizes/displays adequate comfort level or baseline comfort level  Description: Patients pain in R side of mouth and teeth improved after percocet ordered and given this AM.  Pt given ice pack for comfort and educated to notify for worsening pain  11/19/2024 1359 by Angie Mercado RN  Outcome: Progressing  11/19/2024 0229 by Emily Naylor RN  Outcome: Progressing  Flowsheets (Taken 11/19/2024 0229)  Verbalizes/displays adequate comfort level or baseline comfort level:   Assess pain using appropriate pain scale   Administer analgesics based on type and severity of pain and evaluate response   Encourage patient to monitor pain and request assistance     Problem: Skin/Tissue Integrity  Goal: Absence of new skin breakdown  Description: Monitor for areas of redness and/or skin breakdown    Outcome: Progressing     Problem: ABCDS Injury Assessment  Goal: Absence of physical injury  Description: Safety precautions in place to prevent injury  11/19/2024 1359 by Angie Mercado RN  Outcome: Progressing  11/19/2024 0229 by Emily Naylor RN  Outcome: Progressing  Flowsheets  Taken 11/19/2024 0229  Absence of Physical Injury: Implement safety measures based on patient assessment  Taken 11/19/2024 0145  Absence of Physical Injury: Implement safety measures based on patient assessment       Problem: Neurosensory - Adult  Goal: Achieves maximal functionality and self care  Description: Patient had stroke-like symptoms on admission.  Pt R sided weakness improving, able  to lift R arm off bed  Outcome: Progressing     Problem: Musculoskeletal - Adult  Goal: Return mobility to safest level of function  Description: Assist patient with ADLs, administer pain medication to promote participation with PT/OT.  Pt able to stand on side of bed this AM  Outcome: Progressing     Problem: Infection - Adult  Goal: Absence of infection at discharge  Description: Administer IV Antibiotics as ordered. Monitor for fever and WBC count  Outcome: Progressing

## 2024-11-19 NOTE — H&P
History & Physical    Patient: Shaila Martinez MRN: [id number]  CSN: [id number]    YOB: 1980  Age: 44 y.o.  Sex: female      DOA: 11/18/2024    Chief Complaint:   Chief Complaint   Patient presents with    Dental Pain          HPI:     Shaila Martinez is a 44 y.o. female who presented with pain and suspected dental infection and neurologic changes.  Patient presented prior to the emergency room due to similar right lower jaw pain.  Patient was worked up in the emergency room prior and was given antibiotics and discharged home.  Patient had been taking antibiotics however despite this, continued to feel poorly and so came in to the emergency room again.  In the emergency room patient worked up again, found to have phlegmon suspected from dental infection.  During evaluation was noted to have weakness in the right upper and lower extremity along with right facial paresthesia.  Given such code stroke initiated in the emergency room and patient worked up further.  CT and CTA head and neck were negative overall.  No other significant findings noted on laboratory workup.  Given such patient was started on antibiotics.  Given this facility does not have OMFS, ENT was consulted who stated they did not have any procedure that they felt they would be able to do with this patient.  [facility] and [facility] Were both consulted, reviewed the case and stated that did not feel as though they would need to do any type of OMFS procedure and so declined transfer.  Infectious disease was consulted on our service and patient admitted for CVA workup.    Past Medical History:   Diagnosis Date    Seizures (HCC)     Syphilis affecting pregnancy        Past Surgical History:   Procedure Laterality Date    DILATION AND CURETTAGE OF UTERUS      For TABx2       Family History   Problem Relation Age of Onset    Diabetes Sister 30    Hypertension Sister 29    Cancer Maternal Grandmother         breast cancer,  Time: 11/18/24  5:07 PM   Result Value Ref Range    Color, UA YELLOW      Appearance CLEAR      Specific Gravity, UA >1.030 (H) 1.003 - 1.030    pH, Urine 6.0 5.0 - 8.0      Protein, UA Negative NEG mg/dL    Glucose, Ur Negative NEG mg/dL    Ketones, Urine Negative NEG mg/dL    Bilirubin, Urine Negative NEG      Blood, Urine MODERATE (A) NEG      Urobilinogen, Urine 1.0 0.2 - 1.0 EU/dL    Nitrite, Urine Negative NEG      Leukocyte Esterase, Urine Negative NEG     Urine Drug Screen    Collection Time: 11/18/24  5:07 PM   Result Value Ref Range    Benzodiazepines, Urine Negative NEG      Barbiturates, Urine Negative NEG      THC, TH-Cannabinol, Urine Positive (A) NEG      Opiates, Urine Positive (A) NEG      Phencyclidine, Urine Negative NEG      Cocaine, Urine Negative NEG      Amphetamine, Urine Negative NEG      Methadone, Urine Negative NEG      Comments: (NOTE)    Urinalysis, Micro    Collection Time: 11/18/24  5:07 PM   Result Value Ref Range    WBC, UA 0-3 0 - 4 /hpf    RBC, UA 0-3 0 - 5 /hpf    Epithelial Cells, UA 1+ 0 - 5 /lpf    BACTERIA, URINE Negative NEG /hpf            Imaging:  XR (Most Recent). Xray Result (most recent):  XR THORACIC SPINE (2 VIEWS) 07/13/2023    Narrative  History: Low back pain.    Correlation is made with CTA chest July 2018.    TECHNIQUE: 2 views thoracic spine.    FINDINGS:    Mild multilevel degenerative findings. Mild kyphosis. Lungs grossly clear.  Minimal vertebral body height loss in the mid thoracic spine new since previous  CT of 2018    Impression  Minimal mid thoracic vertebral body height loss which is presumed to be  degenerative but correlation with point tenderness can be obtained.  If symptoms persist consider MRI.        CT (Most Recent) CAT Scan Result (most recent):  CTA HEAD NECK W CONTRAST 11/18/2024 (Preliminary)  This result has not been signed. Information might be incomplete.    Narrative  Preliminary:    CT head neck: No large vessel occlusion is  demonstrated. Full report to follow.          Procedures/imaging: see electronic medical records for all procedures/Xrays and details which were not copied into this note but were reviewed prior to creation of Plan      Assessment/Plan   44 y.o. female  now admitted with CVA workup.  - Admit to Telemetry  Principal Problem:    Stroke-like symptom  Resolved Problems:    * No resolved hospital problems. *       Assessment  CVA workup, right lower and upper extremity weakness  Dental infection with phlegmon      Plan  Patient with symptoms that are concerning for CVA.  No obvious findings on CT or CTA.  Permissive hypertension for 24 hours, IV fluids, usual PT OT SLP as needed.  MRI brain without contrast.  May benefit from neurology consult given unusual symptoms.  May have trismus to explain paresthesias and change in sensorium to her face.  Uncertain if any type of psychosomatic component to this however would not be surprised.  Other labs per usual  Dental infection, currently with Unasyn on board to cover along with clindamycin.  Will transition to p.o. antibiotics as appropriate when able to.  Patient will need to follow-up outpatient with oral surgery.  Infectious disease on board at this time.          Diet Diet NPO   DVT Prophylaxis Lovenox   GI Prophylaxis Famotidine   Code status Full       Discussed with patient at bedside about hospital admission and plan care.  They understand and agree.  All questions answered.      Disclaimer: Sections of this note are dictated using utilizing voice recognition software. Minor typographical errors may be present. If questions arise, please do not hesitate to contact me or call our department.        Bernardo Wisdom MD MD  [facility]

## 2024-11-19 NOTE — PROGRESS NOTES
Otolaryngology head neck surgery    Consult note    Patient is a 44-year-old female who presented with difficulties with facial pain, dental infection and neurologic changes.  Patient states that her symptomatology began with pain in the right lower jaw.  She was worked up in the emergency room several days prior was discharged to home however was feeling continued poorly and return to the emergency room.  Patient did undergo a CT scan which revealed a phlegmon in the buccal space.  Patient however was also noted to have weakness in the right upper and lower extremity along with right facial nerve paralysis  Patient subsequently admitted for evaluation.  ENT consultation requested regarding difficulties with oral cavity infection    Past medical history-history of seizures and syphilis    Past surgical history includes D&C    Family history noncontributory    Allergies denied    Home medication includes Voltaren and Percocet        Physical exam  Well-developed well-nourished black female sitting upright relatively comfortably however with marked reluctance to move  Intraoral exam reveals some trismus however with manipulation patient's mouth is able to open.  Significant tenderness along the right lower molars no evidence of any obvious abscess formation seen palpation reveals exquisite tenderness  Neck exam reveals some tenderness on palpation of the neck however no obvious palpable abnormality seen    Patient exam reveals significant weakness at least over the marginal mandibular.  Patient with some reluctance to engage in facial movement testing however obvious weakness of marginal mandibular noted    Extremity exam reveals diminished movement of the right upper and lower extremity    CT scan reviewed both images and report  Some fluid collection noted in the buccal space    Impression  Workup to rule out CVA or neurogenic issue  2.  Right-sided odontogenic infection    Plan  Have discussed with admitting

## 2024-11-19 NOTE — ED NOTES
TRANSFER - OUT REPORT:    Verbal report given to Louisa Naylor RN on Shaila Martinez  being transferred to [place] [address] for routine progression of patient care       Report consisted of patient's Situation, Background, Assessment and   Recommendations(SBAR).     Information from the following report(s) Nurse Handoff Report, ED Encounter Summary, ED SBAR, Intake/Output, and MAR was reviewed with the receiving nurse.    [place] Fall Assessment:    Presents to emergency department  because of falls (Syncope, seizure, or loss of consciousness): No  Age > 70: No  Altered Mental Status, Intoxication with alcohol or substance confusion (Disorientation, impaired judgment, poor safety awaremess, or inability to follow instructions): No  Impaired Mobility: Ambulates or transfers with assistive devices or assistance; Unable to ambulate or transer.: No  Nursing Judgement: No          Lines:   Peripheral IV 11/18/24 Left;Proximal Forearm (Active)   Site Assessment Clean, dry & intact 11/18/24 1320   Line Status Brisk blood return;Flushed;Normal saline locked;Specimen collected 11/18/24 1320   Phlebitis Assessment No symptoms 11/18/24 1320   Infiltration Assessment 0 11/18/24 1320   Dressing Status New dressing applied 11/18/24 1320   Dressing Type Transparent 11/18/24 1320        Opportunity for questions and clarification was provided.      Patient transported with:  Monitor

## 2024-11-19 NOTE — CONSULTS
[facility]  Department of Neurology  Chief Complaint   Patient presents with    Dental Pain       HPI:   Shaila Martinez is a 44 y.o. female who came for severe progressive pain at right teeth and face. In the past 3 days, patient had severe toothache, 10/10, radiating to right jaw, neck, with edema. She was unable to open her mouth to eat and drink. She had difficulty talking. In addition, she felt her right upper and lower limbs weak. She lost feeling at right upper and lower limbs. She was unable to walk. She was prescribed with antibiotics by ER physician, without relief. At the admission day, her symptoms were getting worse, and she was unable to see dentist immediately. Thus she came to ER to be admitted for right limb weakness evaluation. She will see dentist upon discharge.     Per chart, ENT was consulted who stated they did not have any procedure that they felt they would be able to do with this patient.  [facility] and [facility] Were both consulted, reviewed the case and stated that did not feel as though they would need to do any type of OMFS procedure and so declined transfer.      Past Medical History:   Diagnosis Date    Seizures (HCC)     Syphilis affecting pregnancy        Past Surgical History:   Procedure Laterality Date    DILATION AND CURETTAGE OF UTERUS      For TABx2       Current Facility-Administered Medications   Medication Dose Route Frequency Provider Last Rate Last Admin    0.9 % sodium chloride infusion   IntraVENous Continuous Bernardo Wisdom MD 75 mL/hr at 11/19/24 1055 New Bag at 11/19/24 1055    sodium chloride flush 0.9 % injection 5-40 mL  5-40 mL IntraVENous 2 times per day Bernardo Wisdom MD   10 mL at 11/19/24 1051    sodium chloride flush 0.9 % injection 5-40 mL  5-40 mL IntraVENous PRN Bernardo Wisdom MD        0.9 % sodium chloride infusion   IntraVENous PRN Bernardo Wisdom MD   Stopped at 11/19/24 0218    acetaminophen (TYLENOL) tablet 650 mg  650 mg  communicating results to the patient/family/caregiver  7-Care coordination  8-Ordering medications, tests, or procedures  9-Referring and communicating with other health care professionals  10-Documentation in EHR.     Signed By: Arlene Olivera MD. PhD, MS, FAASM

## 2024-11-19 NOTE — CONSULTS
[facility]  (A Division of [facility])      Consultation Note      Date of Admission: 11/18/2024    Date of Note: 11/19/2024      Reason for Referral: Dental abscess and right facial swelling  Referring Physician: Dr. Megan Hwang from this admission:   None    Current Antimicrobials:    Prior Antimicrobials:  Unasyn 11/18 to present  Clindamycin 11/18 to present        Assessment:         Right first molar dental abscess with associated soft tissue swelling and cellulitis to the right lower face:    -No drainable fluid collection noted on CT of the head and neck or CTA.   -OMFS contacted by the ED and advised against any surgical procedure at this time.    Right upper and lower extremity weakness: Admitted for stroke workup.  MRI of brain pending.    Plan:   Plan for IV antibiotics for another 24 hours or until she is able to open her mouth.  Then can transition to oral antibiotic.    Agree with Unasyn  Can discontinue clindamycin- does not add much overall coverage to Unasyn outside of MRSA    Trend CBC, BMP    Discussed with Dr. Guzman at time of consult    Gael Daniel DO  [facility]  [address], [address], [place] [address]  Office: [phone], Ext [phone]    HPI:  Ms. Martinez is a 44-year-old female with a history of seizure disorder who came to the emergency department with complaints of neurological changes as well as right jaw pain.  She says that her jaw pain initially had started on Saturday when she had some tenderness and pain in the lower right anterior jaw.  She had gone to the emergency department and gotten some Percocet as well as amoxicillin.  She had made an appointment to go to the dentist but they wanted her to complete her antibiotics before they would perform any procedures.  The pain and swelling had acutely worsened Sunday night into Monday morning.  She then started having fevers and chills  nerves intact   Psychiatric:   appropriate and interactive.       Labs: Results:   Chemistry Recent Labs     11/18/24  1320 11/19/24  0712    138   K 3.5 3.7    110   CO2 25 21   BUN 5* 8   GLOB 3.6  --       CBC w/Diff Recent Labs     11/18/24  1320   WBC 10.9   RBC 4.41   HGB 12.7   HCT 38.2               No results found for: \"SDES\" No components found for: \"CULT\"     Results       ** No results found for the last 336 hours. **                  Imaging:     All available imaging since presentation reviewed as per EPIC

## 2024-11-19 NOTE — PLAN OF CARE
Problem: Safety - Adult  Goal: Free from fall injury  Outcome: Progressing  Flowsheets (Taken 11/19/2024 0229)  Free From Fall Injury: Instruct family/caregiver on patient safety     Problem: Pain  Goal: Verbalizes/displays adequate comfort level or baseline comfort level  Outcome: Progressing  Flowsheets (Taken 11/19/2024 0229)  Verbalizes/displays adequate comfort level or baseline comfort level:   Assess pain using appropriate pain scale   Administer analgesics based on type and severity of pain and evaluate response   Encourage patient to monitor pain and request assistance     Problem: ABCDS Injury Assessment  Goal: Absence of physical injury  Outcome: Progressing  Flowsheets (Taken 11/19/2024 0229)  Absence of Physical Injury: Implement safety measures based on patient assessment

## 2024-11-19 NOTE — PROGRESS NOTES
MRI screening form needs to be filled out and faxed to [phone] BEFORE MRI can be scheduled.  If unable to obtain information from patient , [facility] needs to be contacted . If patient is claustrophobic or will needs pain meds, please have ordered in advance in order to facilitate exam.

## 2024-11-19 NOTE — PROGRESS NOTES
Patient was admitted earlier today.    I have seen and examined the patient reviewed the labs chart.    Consult from ID ENT noted plan    Continue IV antibiotic continue for now; and pain control;

## 2024-11-19 NOTE — PLAN OF CARE
Problem: Occupational Therapy - Adult  Goal: By Discharge: Performs self-care activities at highest level of function for planned discharge setting.  See evaluation for individualized goals.  Description: Occupational Therapy Goals:  Initiated 11/19/2024 to be met within 7-10 days.    1.  Patient will perform self-feeding with modified independence.   2.  Patient will perform grooming with minimal assistance.  3.  Patient will perform upper body dressing  with minimal assistance.  4.  Patient will perform toilet transfers with minimal assistance  5.  Patient will perform all aspects of toileting with moderate assistance.  6.  Patient will participate in upper extremity therapeutic exercise/activities for RUE neuro re-education with minimal assistance for 8-10 minutes to increase strength/endurance for ADLs.      PLOF: Patient was independent with basic self care tasks and used no AD for functional mobility PTA. She lives with her children.     Outcome: Progressing   OCCUPATIONAL THERAPY EVALUATION    Patient: Shaila Martinez (44 y.o. female)  Date: 11/19/2024  Primary Diagnosis: Phlegmon [L02.91]  Dental caries [K02.9]  Stroke-like symptom [R29.90]  Ischemic stroke (HCC) [I63.9]       Precautions: Fall Risk,     ASSESSMENT :  Patient presents with decreased ADLs, decreased functional mobility and muscle weakness in RUE, complicated by pain in mouth and neck area from dental issue.  She has difficulty communicating verbally secondary to keeping her mouth tight from pain but is intelligible.  Mod to max assist needed for supine to sit on EOB as patient assisting with her left side only.  Minimal active movement noted in her RUE in her fingers and at elbow with gravity eliminated.  She is not able to use her dominant RUE functionally.  Balance maintained while seated with bilateral feet on the floor and hips straight.  She did use her LUE on the bedrail for stability.  Mod to max assist required for self care tasks.  Mod  that an AM-PAC score of 17 or less is not associated with a discharge to the patient's home setting and the patient demonstrates the potential endurance and/or tolerance for 3 hours of therapy each day.    This AMPAC score should be considered in conjunction with interdisciplinary team recommendations to determine the most appropriate discharge setting. Patient's social support, diagnosis, medical stability, and prior level of function should also be taken into consideration.     SUBJECTIVE:   Patient stated, “I can move my fingers more than yesterday.”    OBJECTIVE DATA SUMMARY:     Past Medical History:   Diagnosis Date    Seizures (HCC)     Syphilis affecting pregnancy      Past Surgical History:   Procedure Laterality Date    DILATION AND CURETTAGE OF UTERUS      For TABx2       Home Situation:   Social/Functional History  Lives With: Family  Type of Home: House  Home Layout: Two level  Bathroom Shower/Tub: Tub/Shower unit  Bathroom Toilet: Standard  Bathroom Equipment: None  Home Equipment: None  [x]  Right hand dominant   []  Left hand dominant    Cognitive/Behavioral Status:  Orientation  Overall Orientation Status: Within Normal Limits  Orientation Level: Oriented X4  Cognition  Overall Cognitive Status: WNL    Skin: Intact on UEs  Edema: None noted in UEs    Vision/Perceptual:    Vision  Vision: Within Functional Limits        Coordination: BUE  Coordination: Grossly decreased, non-functional (RUE; LUE WFL)    Balance:  Balance  Sitting: With support  Standing: Impaired  Standing - Static: Constant support  Standing - Dynamic: Constant support    Strength: BUE  Strength: Grossly decreased, non-functional (RUE; LUE WFL)    Tone & Sensation: BUE  Tone: Normal  Sensation: Intact    Range of Motion: BUE  AROM: Grossly decreased, non-functional (RUE; LUE WFL)  PROM: Within functional limits    Functional Mobility and Transfers for ADLs:  Bed Mobility:  Bed Mobility Training  Bed Mobility Training: Yes  Supine to

## 2024-11-19 NOTE — PROGRESS NOTES
4 Eyes Skin Assessment     NAME:  Shaila Martinez  YOB: 1980  MEDICAL RECORD NUMBER:  [id number]    The patient is being assessed for  Shift Handoff    I agree that at least one RN has performed a thorough Head to Toe Skin Assessment on the patient. ALL assessment sites listed below have been assessed.      Areas assessed by both nurses:    Head, Face, Ears, Shoulders, Back, Chest, Arms, Elbows, Hands, Sacrum. Buttock, Coccyx, Ischium, Legs. Feet and Heels, and Under Medical Devices         Does the Patient have a Wound? No noted wound(s)       Kwan Prevention initiated by RN: No  Wound Care Orders initiated by RN: No    Pressure Injury (Stage 3,4, Unstageable, DTI, NWPT, and Complex wounds) if present, place Wound referral order by RN under : No    New Ostomies, if present place, Ostomy referral order under : No     Nurse 1 eSignature: Electronically signed by Emily Naylor RN on 11/19/24 at 7:40 AM EST    **SHARE this note so that the co-signing nurse can place an eSignature**    Nurse 2 eSignature: Electronically signed by Angie Mercado RN on 11/19/24 at 7:41 AM EST

## 2024-11-19 NOTE — ED PROVIDER NOTES
Dr. Guzman taken for patient care.  Patient signed out to me pending transfer versus admission.  Patient had OMFS consulted prior to me taking over who did not recommend transfer at this time and did recommend IV antibiotics.  Spoke with hospitalist who recommended infectious disease consult.  Spoke with infectious disease who is on board and agrees with Unasyn at this time.  Will admit patient to the hospital     Papa Guzman Jr., DO  11/18/24 0457

## 2024-11-20 LAB
CHOLEST SERPL-MCNC: 164 MG/DL
ERYTHROCYTE [DISTWIDTH] IN BLOOD BY AUTOMATED COUNT: 13.3 % (ref 11.6–14.5)
EST. AVERAGE GLUCOSE BLD GHB EST-MCNC: 114 MG/DL
HBA1C MFR BLD: 5.6 % (ref 4.2–5.6)
HCT VFR BLD AUTO: 33.5 % (ref 35–45)
HDLC SERPL-MCNC: 54 MG/DL (ref 40–60)
HDLC SERPL: 3 (ref 0–5)
HGB BLD-MCNC: 11.2 G/DL (ref 12–16)
LDLC SERPL CALC-MCNC: 97.4 MG/DL (ref 0–100)
LIPID PANEL: NORMAL
MCH RBC QN AUTO: 28.8 PG (ref 24–34)
MCHC RBC AUTO-ENTMCNC: 33.4 G/DL (ref 31–37)
MCV RBC AUTO: 86.1 FL (ref 78–100)
NRBC # BLD: 0 K/UL (ref 0–0.01)
NRBC BLD-RTO: 0 PER 100 WBC
PLATELET # BLD AUTO: 319 K/UL (ref 135–420)
PMV BLD AUTO: 10.8 FL (ref 9.2–11.8)
RBC # BLD AUTO: 3.89 M/UL (ref 4.2–5.3)
TRIGL SERPL-MCNC: 63 MG/DL
VLDLC SERPL CALC-MCNC: 12.6 MG/DL
WBC # BLD AUTO: 10.1 K/UL (ref 4.6–13.2)

## 2024-11-20 PROCEDURE — 85027 COMPLETE CBC AUTOMATED: CPT

## 2024-11-20 PROCEDURE — 36415 COLL VENOUS BLD VENIPUNCTURE: CPT

## 2024-11-20 PROCEDURE — 6370000000 HC RX 637 (ALT 250 FOR IP): Performed by: STUDENT IN AN ORGANIZED HEALTH CARE EDUCATION/TRAINING PROGRAM

## 2024-11-20 PROCEDURE — 80061 LIPID PANEL: CPT

## 2024-11-20 PROCEDURE — 94761 N-INVAS EAR/PLS OXIMETRY MLT: CPT

## 2024-11-20 PROCEDURE — 2580000003 HC RX 258: Performed by: STUDENT IN AN ORGANIZED HEALTH CARE EDUCATION/TRAINING PROGRAM

## 2024-11-20 PROCEDURE — 83036 HEMOGLOBIN GLYCOSYLATED A1C: CPT

## 2024-11-20 PROCEDURE — 1100000003 HC PRIVATE W/ TELEMETRY

## 2024-11-20 PROCEDURE — 97116 GAIT TRAINING THERAPY: CPT

## 2024-11-20 PROCEDURE — 6360000002 HC RX W HCPCS: Performed by: STUDENT IN AN ORGANIZED HEALTH CARE EDUCATION/TRAINING PROGRAM

## 2024-11-20 PROCEDURE — 6370000000 HC RX 637 (ALT 250 FOR IP): Performed by: INTERNAL MEDICINE

## 2024-11-20 PROCEDURE — 97530 THERAPEUTIC ACTIVITIES: CPT

## 2024-11-20 PROCEDURE — 99232 SBSQ HOSP IP/OBS MODERATE 35: CPT | Performed by: INTERNAL MEDICINE

## 2024-11-20 PROCEDURE — 92526 ORAL FUNCTION THERAPY: CPT

## 2024-11-20 RX ADMIN — SODIUM CHLORIDE 3000 MG: 900 INJECTION INTRAVENOUS at 11:02

## 2024-11-20 RX ADMIN — ASPIRIN 81 MG CHEWABLE TABLET 81 MG: 81 TABLET CHEWABLE at 09:18

## 2024-11-20 RX ADMIN — OXYCODONE HYDROCHLORIDE AND ACETAMINOPHEN 2 TABLET: 5; 325 TABLET ORAL at 23:30

## 2024-11-20 RX ADMIN — SODIUM CHLORIDE, PRESERVATIVE FREE 10 ML: 5 INJECTION INTRAVENOUS at 21:12

## 2024-11-20 RX ADMIN — SODIUM CHLORIDE 3000 MG: 900 INJECTION INTRAVENOUS at 18:00

## 2024-11-20 RX ADMIN — SODIUM CHLORIDE 3000 MG: 900 INJECTION INTRAVENOUS at 22:49

## 2024-11-20 RX ADMIN — OXYCODONE HYDROCHLORIDE AND ACETAMINOPHEN 2 TABLET: 5; 325 TABLET ORAL at 05:10

## 2024-11-20 RX ADMIN — OXYCODONE HYDROCHLORIDE AND ACETAMINOPHEN 2 TABLET: 5; 325 TABLET ORAL at 17:56

## 2024-11-20 RX ADMIN — OXYCODONE HYDROCHLORIDE AND ACETAMINOPHEN 2 TABLET: 5; 325 TABLET ORAL at 11:09

## 2024-11-20 RX ADMIN — SODIUM CHLORIDE: 9 INJECTION, SOLUTION INTRAVENOUS at 17:58

## 2024-11-20 RX ADMIN — ATORVASTATIN CALCIUM 80 MG: 40 TABLET, FILM COATED ORAL at 21:09

## 2024-11-20 RX ADMIN — SODIUM CHLORIDE 3000 MG: 900 INJECTION INTRAVENOUS at 05:12

## 2024-11-20 RX ADMIN — ENOXAPARIN SODIUM 40 MG: 100 INJECTION SUBCUTANEOUS at 09:18

## 2024-11-20 ASSESSMENT — PAIN SCALES - GENERAL
PAINLEVEL_OUTOF10: 0
PAINLEVEL_OUTOF10: 7
PAINLEVEL_OUTOF10: 9
PAINLEVEL_OUTOF10: 7
PAINLEVEL_OUTOF10: 2
PAINLEVEL_OUTOF10: 7
PAINLEVEL_OUTOF10: 0
PAINLEVEL_OUTOF10: 6

## 2024-11-20 ASSESSMENT — PAIN DESCRIPTION - FREQUENCY
FREQUENCY: CONTINUOUS

## 2024-11-20 ASSESSMENT — PAIN - FUNCTIONAL ASSESSMENT
PAIN_FUNCTIONAL_ASSESSMENT: ACTIVITIES ARE NOT PREVENTED
PAIN_FUNCTIONAL_ASSESSMENT: PREVENTS OR INTERFERES SOME ACTIVE ACTIVITIES AND ADLS
PAIN_FUNCTIONAL_ASSESSMENT: ACTIVITIES ARE NOT PREVENTED
PAIN_FUNCTIONAL_ASSESSMENT: PREVENTS OR INTERFERES SOME ACTIVE ACTIVITIES AND ADLS

## 2024-11-20 ASSESSMENT — PAIN DESCRIPTION - ORIENTATION
ORIENTATION: RIGHT
ORIENTATION: RIGHT
ORIENTATION: RIGHT;LOWER
ORIENTATION: RIGHT

## 2024-11-20 ASSESSMENT — PAIN DESCRIPTION - ONSET
ONSET: ON-GOING

## 2024-11-20 ASSESSMENT — PAIN DESCRIPTION - DESCRIPTORS
DESCRIPTORS: ACHING

## 2024-11-20 ASSESSMENT — PAIN DESCRIPTION - PAIN TYPE
TYPE: ACUTE PAIN

## 2024-11-20 ASSESSMENT — PAIN DESCRIPTION - LOCATION
LOCATION: JAW;TEETH
LOCATION: MOUTH;TEETH
LOCATION: JAW;TEETH
LOCATION: MOUTH

## 2024-11-20 NOTE — PROGRESS NOTES
[facility]  (A Division of [facility])      Consultation Note      Date of Admission: 11/18/2024    Date of Note: 11/20/2024      Reason for Referral: Dental abscess and right facial swelling  Referring Physician: Dr. Megan Hwang from this admission:   None    Current Antimicrobials:    Prior Antimicrobials:  Unasyn 11/18 to present  Clindamycin 11/18 to present        Assessment:         Right first molar dental abscess with associated soft tissue swelling and cellulitis to the right lower face:    -No drainable fluid collection noted on CT of the head and neck or CTA.   -OMFS contacted by the ED and advised against any surgical procedure at this time.    Right upper and lower extremity weakness: Admitted for stroke workup.  MRI of brain pending.    Plan:   Continue Unasyn while she is in the hospital   -At time of discharge, transition to Augmentin 875 p.o. twice daily.   -Stop date 12/1  Needs to see a dentist or OMFS after completion of her antibiotics in 2 weeks.    Trend CBC, BMP    Gael Daniel DO  [facility]  [address], [address], [place] [address]  Office: [phone], Ext [phone]    Subjective:   Feeling much better today.  Was able to sleep.  Pain is improved.  Swelling has improved.  She is able to open her mouth most of the way.  No sore throat.  No fevers.  Tolerating antibiotics well.  Feels as though she is regaining some strength and movement in her right arm and leg.    Tmax 99.0  WBC 10.1    HPI:  Ms. Martinez is a 44-year-old female with a history of seizure disorder who came to the emergency department with complaints of neurological changes as well as right jaw pain.  She says that her jaw pain initially had started on Saturday when she had some tenderness and pain in the lower right anterior jaw.  She had gone to the emergency department and gotten some Percocet as well as amoxicillin.  She had  made an appointment to go to the dentist but they wanted her to complete her antibiotics before they would perform any procedures.  The pain and swelling had acutely worsened  night into Monday morning.  She then started having fevers and chills as well for the last 24 hours.  Additionally, she was having some weakness in the right upper and lower extremity with right sided facial paresthesias.  A code stroke was called and she had a CT of the head and neck.  As part of that workup, her CT scan had revealed some right facial soft tissue swelling over the right mandible with underlying periapical lucency in the roots of the right mandibular first molar consistent with an acute dental infection.  No drainable abscess was noted.  The ED has contacted [facility] at [facility] and they had declined transfer and stated that no additional intervention was needed at this time outside of antibiotics.  When the patient was seen today, she continues to have ongoing pain in the right jaw however she feels as though the swelling is about 50% better than what it had been yesterday evening.  She is not having any fevers or chills.  She still having a difficult time opening her mouth secondary to pain.  She does not remark upon any kind of right sided weakness in her upper or lower extremities at this time.           Objective:      /80   Pulse 73   Temp 99 °F (37.2 °C) (Axillary)   Resp 16   Ht 1.727 m (5' 8\")   Wt 92.1 kg (203 lb)   LMP 2024 (Approximate)   SpO2 93%   BMI 30.87 kg/m²   Temp (24hrs), Av.4 °F (36.9 °C), Min:98.1 °F (36.7 °C), Max:99 °F (37.2 °C)        General:   awake alert and oriented, appears stated age   Skin:   no rashes or skin lesions noted on limited exam, no jaundice   HEENT:  Normocephalic, atraumatic, PERRL, EOMI, no scleral icterus; no conjunctival hemmohage; modest amount of swelling to the anterior middle portion of the right mandible, tender to touch, very slight  overlying erythema but no other notable skin changes.  Unable to open her mouth secondary to pain to allow me to look more closely at her teeth.   Lymph Nodes:   no cervical or inguinal adenopathy   Lungs:   non-labored, bilaterally clear to aspiration   Heart:  RRR, s1 and s2; no murmurs, no edema, + pedal pulses   Abdomen:  soft, non-distended, active bowel sounds. Non-tender   Genitourinary:  deferred   Extremities:   no clubbing, cyanosis; no joint effusions or swelling; muscle mass appropriate for age   Neurologic:  No gross focal sensory abnormalities; equal muscle strength to upper and lower extremities. Speech appropirate. Cranial nerves intact   Psychiatric:   appropriate and interactive.       Labs: Results:   Chemistry Recent Labs     11/18/24  1320 11/19/24  0712    138   K 3.5 3.7    110   CO2 25 21   BUN 5* 8   GLOB 3.6  --       CBC w/Diff Recent Labs     11/18/24  1320 11/20/24  0123   WBC 10.9 10.1   RBC 4.41 3.89*   HGB 12.7 11.2*   HCT 38.2 33.5*    319            No results found for: \"SDES\" No components found for: \"CULT\"     Results       ** No results found for the last 336 hours. **                  Imaging:     All available imaging since presentation reviewed as per EPIC

## 2024-11-20 NOTE — PLAN OF CARE
Problem: SLP Adult - Impaired Swallowing  Goal: By Discharge: Advance to least restrictive diet without signs or symptoms of aspiration for planned discharge setting.  See evaluation for individualized goals.  Description: Patient will:  1. Tolerate PO trials with 0 s/s overt distress in 4/5 trials  2. Utilize compensatory swallow strategies/maneuvers (decrease bite/sip, size/rate, alt. liq/sol) with min cues in 4/5 trials    Rec:     Full thin liquids  Aspiration precautions  HOB >45 during po intake, remain >30 for 30-45 minutes after po   Small bites/sips; alternate liquid/solid with slow feeding rate   Oral care TID  Meds per pt preference      Outcome: Progressing   SPEECH LANGUAGE PATHOLOGY DYSPHAGIA TREATMENT    Patient: Shaila Martinez (44 y.o. female)  Date: 11/20/2024  Diagnosis: Phlegmon [L02.91]  Dental caries [K02.9]  Stroke-like symptom [R29.90]  Ischemic stroke (HCC) [I63.9] Stroke-like symptom      Precautions: Standard, aspiration  PLOF: As per H&P      ASSESSMENT:  Pt was seen for follow up dysphagia management. Pt reported improved swelling this date; however, she continues to present all PO to the left side of mouth with mastication on the left side. Laryngeal elevation was functional/timely to palpation. Continue to suspect that full thin liquids would be the least restrictive diet at this time; however, nursing upgraded pt to easy to chew per MD request. Educated pt on safe swallowing techniques/strategies, aspiration precautions, and comp strategies. Encouraged pt to attempt easy to chew solids at lunch and to let RN know if she would prefer full thins. Will continue to follow for further dysphagia management.     Progression toward goals:  []         Improving appropriately and progressing toward goals  [x]         Improving slowly and progressing toward goals  []         Not making progress toward goals and plan of care will be adjusted     PLAN:  Recommendations and Planned Interventions:        Patient continues to benefit from skilled intervention to address the above impairments. Continue treatment per established plan of care.    Frequency/Duration: Patient will be followed by speech-language pathology 1-2 times per day/3-5 days per week to address goals.    Discharge Recommendations: D/C Recommendations: Ongoing speech therapy is recommended during this hospitalization     SUBJECTIVE:   Patient stated “I want to try more solid foods. I think I can do it.”    OBJECTIVE:   Daily Assessment:  Baseline Assessment  Respiratory Status: Room air  O2 Device: None (Room air)  Communication Observation: Functional  Follows Directions: Simple  Current Diet : Regular  Current Liquid Diet : Thin  Dentition: Adequate  Patient Positioning: Upright in bed  Baseline Vocal Quality: Normal  Volitional Cough: Weak    Orientation:  Person, Place, Time, and Situation    Dysphagia Treatment:  Oral Assessment:  Oral Motor   Labial: No impairment  Dentition: Natural  Oral Hygiene: Moist, Clean  Lingual: Decreased strength  Mandible: No impairment        P.O. Trials:  PO Trials  Assessment Method(s): Observation, Palpation  Patient Position: Upright in bed  Vocal Quality: No Impairment  Consistency Presented: Regular, Thin  How Presented: SLP-fed/Presented, Straw  Bolus Acceptance: Impaired  Bolus Formation/Control: Impaired  Type of Impairment: Mastication, Poor  Propulsion: Delayed (# of seconds)  Oral Residue: None  Initiation of Swallow: No impairment  Laryngeal Elevation: Functional  Aspiration Signs/Symptoms: None  Pharyngeal Phase Characteristics: No impairment, issues, or problems  Effective Modifications: None  Cues for Modifications: None            DYSPHAGIA DIAGNOSIS: Dysphagia Diagnosis: Mild oral stage dysphagia    PAIN:  Intensity Pre-treatment: 0/10   Intensity Post-treatment: 0/10  Scale: Numeric Rating Scale    After treatment:   []              Patient left in no apparent distress sitting up in chair  [x]               Patient left in no apparent distress in bed  [x]              Call bell left within reach  [x]              Nursing notified  []              Family present  []              Caregiver present  []              Bed alarm activated    COMMUNICATION/EDUCATION:   [x]            Aspiration precautions; swallow safety; compensatory techniques provided via demonstration, verbalization and teach back of comprehension  [x]         Patient/family have participated as able in goal setting and plan of care.  []            Patient/family agree to work toward stated goals and plan of care.  []            Patient understands intent and goals of therapy, neutral about participation.  []            Patient unable to participate in goal setting/plan of care secondary to cognition, hearing/vision deficits; education ongoing with interdisciplinary staff   []            Handout regarding diet recommendations and thickener instructions provided.  [x]         Posted safety precautions in patient's room.    Thank you for this referral.    Pili Gonzalez M.S., CCC-SLP/L  Speech-Language Pathologist

## 2024-11-20 NOTE — PLAN OF CARE
Problem: Physical Therapy - Adult  Goal: By Discharge: Performs mobility at highest level of function for planned discharge setting.  See evaluation for individualized goals.  Description: Physical Therapy Goals  Initiated 11/19/2024 and to be accomplished within 7 day(s)  1.  Patient will move from supine to sit and sit to supine in bed with supervision/set-up in preparation for seated tasks.    2.  Patient will transfer from bed to chair and chair to bed with minimal assistance using the least restrictive device in preparation for out of bed.  3.  Patient will perform sit to stand with minimal assistance in preparation for out of bed tasks.  4.  Patient will ambulate with minimal assistance for 25 feet with the least restrictive device in preparation for home setting.   5.  Patient will ascend/descend 3 stairs with handrail(s) with moderate assistance in preparation for negotiation of home set-up.    PLOF: Lives with family. Two story home. Independent.  Outcome: Progressing     PHYSICAL THERAPY TREATMENT    Patient: Shaila Martinez (44 y.o. female)  Date: 11/20/2024  Diagnosis: Phlegmon [L02.91]  Dental caries [K02.9]  Stroke-like symptom [R29.90]  Ischemic stroke (HCC) [I63.9] Stroke-like symptom      Precautions: Fall Risk, General Precautions     ASSESSMENT:  Pt received in bed and agreeable. Reporting being able to move R UE/LE this date as well as open her jaw more. Pt navigates to EOB with SBA and increased time, verbal cues to get to EOB to get feet on floor to improve stability. Pt stands to RW with min A and verbal cues for hand placement. Able to  walker. Given instruction in weight shifting to the L to aid in foot clearance in swing, mild circumduction noted 2/2 decreased knee flexion however able to clear foot to advance foot. Pt amb approx 10 ft in [place] with min A for intermittent assist for anterior weight shifting for hips over feet 2/2 posterior lean. Following sitting rest break, pt  performs SPT bed > recliner > BSC with use of RW for support. Pt left in recliner with LE elevated and all needs met at end of session. Will continue to benefit from acute PT during admission. Nursing updated on transferring and increasing time OOB/BSC use.     Progression toward goals:   [x]      Improving appropriately and progressing toward goals  []      Improving slowly and progressing toward goals  []      Not making progress toward goals and plan of care will be adjusted     PLAN:  Patient continues to benefit from skilled intervention to address the above impairments.  Continue treatment per established plan of care.    Further Equipment Recommendations for Discharge: RW, BSC    AMPAC: AM-PAC Inpatient Mobility Raw Score : 17      Current research shows that an AM-PAC score of 17 (13 without stairs) or less is not associated with a discharge to the patient's home setting and this patient demonstrates the potential endurance and/or tolerance for 3 hours of therapy each day at discharge.    This AMPAC score should be considered in conjunction with interdisciplinary team recommendations to determine the most appropriate discharge setting. Patient's social support, diagnosis, medical stability, and prior level of function should also be taken into consideration.     SUBJECTIVE:   Patient stated, \"I am doing better today.\"    OBJECTIVE DATA SUMMARY:   Critical Behavior:  Orientation  Overall Orientation Status: Within Normal Limits  Orientation Level: Oriented X4       Functional Mobility Training:  Bed Mobility:  Bed Mobility Training  Bed Mobility Training: Yes  Supine to Sit: Stand-by assistance;Additional time  Scooting: Stand-by assistance;Additional time  Transfers:  Transfer Training  Transfer Training: Yes  Sit to Stand: Minimum assistance  Stand to Sit: Minimum assistance  Stand Pivot Transfers: Minimum assistance  Bed to Chair: Minimum assistance  Balance:  Balance  Sitting: Intact  Sitting - Static:  Good (unsupported)  Sitting - Dynamic: Good (unsupported)  Standing: Impaired  Standing - Static: Fair  Standing - Dynamic: Fair     Ambulation/Gait Training:     Gait  Gait Training: Yes  Left Side Weight Bearing: As tolerated  Right Side Weight Bearing: As tolerated  Overall Level of Assistance: Minimum assistance  Distance (ft): 10 Feet  Assistive Device: Walker, rolling  Base of Support: Shift to left  Speed/Ariella: Pace decreased (< 100 feet/min);Slow;Shuffled  Step Length: Right shortened;Left shortened  Stance: Right decreased;Left increased  Gait Abnormalities: Circumduction;Decreased step clearance      Pain:  Intensity Pre-treatment: 0/10   Intensity Post-treatment: 0/10      Activity Tolerance:   Activity Tolerance: Patient tolerated treatment well  Please refer to the flowsheet for vital signs taken during this treatment.  After treatment:   [] Patient left in no apparent distress sitting up in chair  [x] Patient left in no apparent distress in bed  [x] Call bell left within reach  [x] Nursing notified  [] Caregiver present  [] Bed alarm activated  [] SCDs applied      COMMUNICATION/EDUCATION:   Patient Education  Education Given To: Patient  Education Provided: Role of Therapy;Plan of Care;Transfer Training;Equipment  Education Method: Demonstration;Verbal;Teach Back  Barriers to Learning: None  Education Outcome: Verbalized understanding;Demonstrated understanding;Continued education needed      Marcela Burton, PT  Minutes: 27

## 2024-11-20 NOTE — PROGRESS NOTES
4 Eyes Skin Assessment     NAME:  Shaila Martinez  YOB: 1980  MEDICAL RECORD NUMBER:  [id number]    The patient is being assessed for  Shift Handoff    I agree that at least one RN has performed a thorough Head to Toe Skin Assessment on the patient. ALL assessment sites listed below have been assessed.      Areas assessed by both nurses:    Head, Face, Ears, Shoulders, Back, Chest, Arms, Elbows, Hands, Sacrum. Buttock, Coccyx, Ischium, Legs. Feet and Heels, and Under Medical Devices         Does the Patient have a Wound? No noted wound(s)       Kwan Prevention initiated by RN: No  Wound Care Orders initiated by RN: No    Pressure Injury (Stage 3,4, Unstageable, DTI, NWPT, and Complex wounds) if present, place Wound referral order by RN under : No    New Ostomies, if present place, Ostomy referral order under : No     Nurse 1 eSignature: Electronically signed by Elizabeth Azar RN on 11/20/24 at 7:30 AM EST    **SHARE this note so that the co-signing nurse can place an eSignature**    Nurse 2 eSignature: Electronically signed by Angie Mercado RN on 11/20/24 at 7:42 AM EST

## 2024-11-20 NOTE — PROGRESS NOTES
ARU/IPR REFERRAL CONTACT NOTE  [facility]      Thank you for the opportunity to review this patient's case for admission to [facility].    Referral received: 11/19/2024 time:1200pm    Based on our pre-admission screening:                          [x ] Our Team/Medical Director is following this case.   Comments: Referral received will review with team .    Again, Thank you for this referral. Should you have any questions please do not hesitate to call.     Sincerely,  Irasema Gasca    Clinical Liaison  [facility]  [phone]

## 2024-11-20 NOTE — PLAN OF CARE
Problem: Safety - Adult  Goal: Free from fall injury  Description: Fall precautions in place. Family at bedside  11/20/2024 0244 by Elizabeth Azar RN  Flowsheets (Taken 11/19/2024 0229 by Emily Naylor, RN)  Free From Fall Injury: Instruct family/caregiver on patient safety  Note: Patient's bed is in the lowest position and personal items are within reach of the patient.  11/19/2024 1359 by Angie Mercado, RN  Outcome: Progressing     Problem: Discharge Planning  Goal: Discharge to home or other facility with appropriate resources  Description: Patient has support from mother and children.  Possible d/c to ARU  11/20/2024 0244 by Elizabeth Azar RN  Flowsheets (Taken 11/19/2024 0120 by Emily Naylor, RN)  Discharge to home or other facility with appropriate resources: Identify barriers to discharge with patient and caregiver  Note: Patient is competent, AOX4, and able to follow instructions.  11/19/2024 1359 by Angie Mercado, RN  Outcome: Progressing  Flowsheets (Taken 11/19/2024 0120 by Emily Naylor, RN)  Discharge to home or other facility with appropriate resources: Identify barriers to discharge with patient and caregiver

## 2024-11-20 NOTE — PLAN OF CARE
Problem: Safety - Adult  Goal: Free from fall injury  Description: Fall precautions in place. Family at bedside  11/20/2024 1133 by Angie Mercado RN  Outcome: Progressing  11/20/2024 0244 by Elizabeth Azar RN  Flowsheets (Taken 11/19/2024 0229 by Emily Naylor, RN)  Free From Fall Injury: Instruct family/caregiver on patient safety  Note: Patient's bed is in the lowest position and personal items are within reach of the patient.     Problem: Discharge Planning  Goal: Discharge to home or other facility with appropriate resources  Description: Patient has support from mother and children.  Possible d/c to ARU  11/20/2024 1133 by Angie Mercado RN  Outcome: Progressing  11/20/2024 0244 by Elizabeth Azar RN  Flowsheets (Taken 11/19/2024 0120 by Emily Naylor, RN)  Discharge to home or other facility with appropriate resources: Identify barriers to discharge with patient and caregiver  Note: Patient is competent, AOX4, and able to follow instructions.     Problem: Pain  Goal: Verbalizes/displays adequate comfort level or baseline comfort level  Description: Patients pain in R side of mouth  Pt given Percocet before lunchtime.  Pain improved from yesterday.  educated to notify for worsening pain  11/20/2024 1133 by Angie Mercado RN  Outcome: Progressing  11/20/2024 0244 by Elizabeth Azar RN  Flowsheets (Taken 11/19/2024 0229 by Emily Naylor, RN)  Verbalizes/displays adequate comfort level or baseline comfort level:   Assess pain using appropriate pain scale   Administer analgesics based on type and severity of pain and evaluate response   Encourage patient to monitor pain and request assistance  Note:   Patient Shaila will exhibit a decrease in pain as evidenced by a pain rating less than 10 on the 0-10 scale within 1 hour of receiving pain medication during the inpatient hospice stay.     Problem: Skin/Tissue Integrity  Goal: Absence of new skin breakdown  Description: Monitor for areas of  redness and/or skin breakdown    11/20/2024 1133 by Angie Mercado RN  Outcome: Progressing  11/20/2024 0244 by Elizabeth Azar, RN  Note: Patient does not have skin breakdown, will remain free of skin breakdown throughout stay.     Problem: ABCDS Injury Assessment  Goal: Absence of physical injury  Description: Safety precautions in place to prevent injury  11/20/2024 1133 by Angie Mercado RN  Outcome: Progressing  11/20/2024 0244 by Elizabeth Azar RN  Flowsheets (Taken 11/19/2024 0229 by Emily Naylor, RN)  Absence of Physical Injury: Implement safety measures based on patient assessment  Note: Peripheral IV site is clean, dry, intact and no signs of infiltration.     Problem: Neurosensory - Adult  Goal: Achieves maximal functionality and self care  Description: Patient had stroke-like symptoms on admission.  Pt R sided weakness improving, able to get up to chair today and use walker  11/20/2024 1133 by Angie Mercado RN  Outcome: Progressing  11/20/2024 0244 by Elizabeth Azar, RN  Flowsheets (Taken 11/20/2024 0230)  Achieves maximal functionality and self care:   Monitor swallowing and airway patency with patient fatigue and changes in neurological status   Encourage and assist patient to increase activity and self care with guidance from physical therapy/occupational therapy  Note: Patient able to lift right arm with less discomfort.     Problem: Musculoskeletal - Adult  Goal: Return mobility to safest level of function  Description: Assist patient with ADLs, administer pain medication to promote participation with PT/OT.  Pt able to stand and get to chair this AM  11/20/2024 1133 by Angie Mercado RN  Outcome: Progressing  11/20/2024 0244 by Elizabeth Azar RN  Flowsheets (Taken 11/20/2024 0230)  Return Mobility to Safest Level of Function:   Assess patient stability and activity tolerance for standing, transferring and ambulating with or without assistive devices   Assist with transfers

## 2024-11-20 NOTE — PROGRESS NOTES
1919 - Received bedside report from Dianne BALL, patient was resting in bed, with visitors at bedside, HOB elevated, bed in lowest position and pt oriented to call button.

## 2024-11-20 NOTE — PROGRESS NOTES
Hospitalist Progress Note    NAME:  Shaila Martinez   :   1980   MRN:   [id number]     Date/Time:  2024  Subjective:   Chief Complaint:  left face swelling;    Patient's right face pain and swelling is coming down stable to open her mouth more eat more food;    Extremity weakness improving able to walk around.          Review of Systems:  Y  N       Y  N  []   [x]    Fever/chills                                               []   [x]    Chest Pain  []   [x]    Cough                                                       []   [x]    Diarrhea   []   [x]    Sputum                                                     []   [x]    Constipation  []   [x]    SOB/DONATO                                                []   [x]    Nausea/Vomit  []   [x]    Abd Pain                                                    []   []    Tolerating PT  []   [x]    Dysuria                                                      []   []    Tolerating Diet     []  Unable to obtain  ROS due to  []  mental status change  []  sedated   []  intubated     Past Med History and Social history reviewed. No changes.   [x]  Current Medication list and allergies reviewed*    Objective:   Vitals:  /75   Pulse 72   Temp 97.7 °F (36.5 °C) (Axillary)   Resp 16   Ht 1.727 m (5' 8\")   Wt 92.1 kg (203 lb)   LMP 2024 (Approximate)   SpO2 97%   BMI 30.87 kg/m²   Temp (24hrs), Av.3 °F (36.8 °C), Min:97.7 °F (36.5 °C), Max:99 °F (37.2 °C)      Last 24hr Input/Output:    Intake/Output Summary (Last 24 hours) at 2024 1448  Last data filed at 2024 0630  Gross per 24 hour   Intake --   Output 2100 ml   Net -2100 ml        PHYSICAL EXAM:  Gen:  []  WD [x]  WN  []  cachectic  []  thin  []  obese  []  disheveled             []   Critically ill or  []  Chronically ill appearing    HEENT:   Right facial swelling redness improving    NECK:   supple [x]  yes  []  no      masses []  yes  []  No               thyroid    []  non tender []

## 2024-11-20 NOTE — PROGRESS NOTES
ARU/IPR REFERRAL CONTACT NOTE  [facility]      Thank you for the opportunity to review this patient's case for admission to [facility].    Referral received: 11/19/2024 time:1200    Based on our pre-admission screening:                          [ x] Our Team/Medical Director is following this case.   Comments: Met with patient at the bedside who is agreeable to ARU.    Spoke with daughter Kallie who reports that she and her siblings will be able to assist at discharge.   Authorization initiated with [facility].    Again, Thank you for this referral. Should you have any questions please do not hesitate to call.     Sincerely,  Irasema Gasca    Clinical Liaison  [facility]  [phone]

## 2024-11-21 ENCOUNTER — HOSPITAL ENCOUNTER (INPATIENT)
Facility: HOSPITAL | Age: 44
DRG: 861 | End: 2024-11-21
Attending: INTERNAL MEDICINE | Admitting: EMERGENCY MEDICINE
Payer: MEDICAID

## 2024-11-21 VITALS
HEART RATE: 66 BPM | WEIGHT: 203 LBS | TEMPERATURE: 98.4 F | SYSTOLIC BLOOD PRESSURE: 123 MMHG | HEIGHT: 68 IN | RESPIRATION RATE: 20 BRPM | DIASTOLIC BLOOD PRESSURE: 83 MMHG | BODY MASS INDEX: 30.77 KG/M2 | OXYGEN SATURATION: 97 %

## 2024-11-21 DIAGNOSIS — R53.1 RIGHT SIDED WEAKNESS: Primary | ICD-10-CM

## 2024-11-21 LAB
ERYTHROCYTE [DISTWIDTH] IN BLOOD BY AUTOMATED COUNT: 13.2 % (ref 11.6–14.5)
HCT VFR BLD AUTO: 36.4 % (ref 35–45)
HGB BLD-MCNC: 11.8 G/DL (ref 12–16)
MCH RBC QN AUTO: 29.1 PG (ref 24–34)
MCHC RBC AUTO-ENTMCNC: 32.4 G/DL (ref 31–37)
MCV RBC AUTO: 89.7 FL (ref 78–100)
NRBC # BLD: 0 K/UL (ref 0–0.01)
NRBC BLD-RTO: 0 PER 100 WBC
PLATELET # BLD AUTO: 334 K/UL (ref 135–420)
PMV BLD AUTO: 10.7 FL (ref 9.2–11.8)
RBC # BLD AUTO: 4.06 M/UL (ref 4.2–5.3)
WBC # BLD AUTO: 6.8 K/UL (ref 4.6–13.2)

## 2024-11-21 PROCEDURE — 1180000000 HC REHAB R&B

## 2024-11-21 PROCEDURE — 36415 COLL VENOUS BLD VENIPUNCTURE: CPT

## 2024-11-21 PROCEDURE — 6370000000 HC RX 637 (ALT 250 FOR IP): Performed by: INTERNAL MEDICINE

## 2024-11-21 PROCEDURE — 6370000000 HC RX 637 (ALT 250 FOR IP): Performed by: STUDENT IN AN ORGANIZED HEALTH CARE EDUCATION/TRAINING PROGRAM

## 2024-11-21 PROCEDURE — 6360000002 HC RX W HCPCS: Performed by: STUDENT IN AN ORGANIZED HEALTH CARE EDUCATION/TRAINING PROGRAM

## 2024-11-21 PROCEDURE — 92526 ORAL FUNCTION THERAPY: CPT

## 2024-11-21 PROCEDURE — 2580000003 HC RX 258: Performed by: STUDENT IN AN ORGANIZED HEALTH CARE EDUCATION/TRAINING PROGRAM

## 2024-11-21 PROCEDURE — 99239 HOSP IP/OBS DSCHRG MGMT >30: CPT | Performed by: INTERNAL MEDICINE

## 2024-11-21 PROCEDURE — 85027 COMPLETE CBC AUTOMATED: CPT

## 2024-11-21 RX ORDER — OXYCODONE AND ACETAMINOPHEN 5; 325 MG/1; MG/1
2 TABLET ORAL EVERY 6 HOURS PRN
Status: DISPENSED | OUTPATIENT
Start: 2024-11-21

## 2024-11-21 RX ORDER — ASPIRIN 81 MG/1
81 TABLET ORAL DAILY
Status: ON HOLD | COMMUNITY

## 2024-11-21 RX ORDER — ATORVASTATIN CALCIUM 80 MG/1
80 TABLET, FILM COATED ORAL DAILY
Status: ON HOLD | COMMUNITY

## 2024-11-21 RX ORDER — POLYETHYLENE GLYCOL 3350 17 G/17G
17 POWDER, FOR SOLUTION ORAL DAILY PRN
Status: DISPENSED | OUTPATIENT
Start: 2024-11-21

## 2024-11-21 RX ORDER — OXYCODONE AND ACETAMINOPHEN 5; 325 MG/1; MG/1
2 TABLET ORAL EVERY 6 HOURS PRN
Qty: 10 TABLET | Refills: 0 | Status: ON HOLD
Start: 2024-11-21 | End: 2024-11-24

## 2024-11-21 RX ORDER — ACETAMINOPHEN 325 MG/1
650 TABLET ORAL EVERY 4 HOURS PRN
Status: DISPENSED | OUTPATIENT
Start: 2024-11-21

## 2024-11-21 RX ORDER — ATORVASTATIN CALCIUM 80 MG/1
80 TABLET, FILM COATED ORAL NIGHTLY
Qty: 30 TABLET | Refills: 3 | Status: SHIPPED | OUTPATIENT
Start: 2024-11-21 | End: 2024-11-21 | Stop reason: HOSPADM

## 2024-11-21 RX ORDER — ONDANSETRON 4 MG/1
4 TABLET, FILM COATED ORAL EVERY 8 HOURS PRN
Status: ACTIVE | OUTPATIENT
Start: 2024-11-21

## 2024-11-21 RX ORDER — ASPIRIN 81 MG/1
81 TABLET, CHEWABLE ORAL DAILY
Qty: 30 TABLET | Refills: 3
Start: 2024-11-22 | End: 2024-11-21 | Stop reason: HOSPADM

## 2024-11-21 RX ADMIN — OXYCODONE HYDROCHLORIDE AND ACETAMINOPHEN 2 TABLET: 5; 325 TABLET ORAL at 10:23

## 2024-11-21 RX ADMIN — AMOXICILLIN AND CLAVULANATE POTASSIUM 1 TABLET: 875; 125 TABLET, FILM COATED ORAL at 18:24

## 2024-11-21 RX ADMIN — SODIUM CHLORIDE 3000 MG: 900 INJECTION INTRAVENOUS at 10:30

## 2024-11-21 RX ADMIN — ENOXAPARIN SODIUM 40 MG: 100 INJECTION SUBCUTANEOUS at 10:24

## 2024-11-21 RX ADMIN — SODIUM CHLORIDE, PRESERVATIVE FREE 10 ML: 5 INJECTION INTRAVENOUS at 10:24

## 2024-11-21 RX ADMIN — SODIUM CHLORIDE 3000 MG: 900 INJECTION INTRAVENOUS at 05:49

## 2024-11-21 RX ADMIN — ACETAMINOPHEN 325MG 650 MG: 325 TABLET ORAL at 18:38

## 2024-11-21 RX ADMIN — ASPIRIN 81 MG CHEWABLE TABLET 81 MG: 81 TABLET CHEWABLE at 10:23

## 2024-11-21 ASSESSMENT — PAIN - FUNCTIONAL ASSESSMENT
PAIN_FUNCTIONAL_ASSESSMENT: ACTIVITIES ARE NOT PREVENTED
PAIN_FUNCTIONAL_ASSESSMENT: ACTIVITIES ARE NOT PREVENTED

## 2024-11-21 ASSESSMENT — PAIN DESCRIPTION - ORIENTATION
ORIENTATION: RIGHT
ORIENTATION: RIGHT

## 2024-11-21 ASSESSMENT — PAIN DESCRIPTION - FREQUENCY
FREQUENCY: INTERMITTENT
FREQUENCY: INTERMITTENT

## 2024-11-21 ASSESSMENT — PAIN DESCRIPTION - DESCRIPTORS
DESCRIPTORS: ACHING
DESCRIPTORS: ACHING

## 2024-11-21 ASSESSMENT — PAIN SCALES - GENERAL
PAINLEVEL_OUTOF10: 0
PAINLEVEL_OUTOF10: 3
PAINLEVEL_OUTOF10: 0
PAINLEVEL_OUTOF10: 10

## 2024-11-21 ASSESSMENT — PAIN DESCRIPTION - ONSET
ONSET: GRADUAL
ONSET: GRADUAL

## 2024-11-21 ASSESSMENT — PAIN DESCRIPTION - PAIN TYPE
TYPE: ACUTE PAIN
TYPE: ACUTE PAIN

## 2024-11-21 ASSESSMENT — PAIN DESCRIPTION - LOCATION
LOCATION: FACE
LOCATION: TEETH

## 2024-11-21 NOTE — PROGRESS NOTES
4 Eyes Skin Assessment     NAME:  Shaila Martinez  YOB: 1980  MEDICAL RECORD NUMBER:  [id number]    The patient is being assessed for  Shift Handoff    I agree that at least one RN has performed a thorough Head to Toe Skin Assessment on the patient. ALL assessment sites listed below have been assessed.      Areas assessed by both nurses:    Head, Face, Ears, Shoulders, Back, Chest, Arms, Elbows, Hands, Sacrum. Buttock, Coccyx, Ischium, and Legs. Feet and Heels        Does the Patient have a Wound? No noted wound(s)       Kwan Prevention initiated by RN: Yes  Wound Care Orders initiated by RN: No    Pressure Injury (Stage 3,4, Unstageable, DTI, NWPT, and Complex wounds) if present, place Wound referral order by RN under : No    New Ostomies, if present place, Ostomy referral order under : No     Nurse 1 eSignature: Electronically signed by MCKINLEY DOW RN on 11/21/24 at 6:17 AM EST    **SHARE this note so that the co-signing nurse can place an eSignature**    Nurse 2 eSignature: {Esignature:[id number]}

## 2024-11-21 NOTE — PLAN OF CARE
Problem: Safety - Adult  Goal: Free from fall injury  Description: Fall precautions in place. Family at bedside  11/21/2024 0043 by Ayesha Holloway RN  Outcome: Progressing  11/21/2024 0042 by Ayesha Holloway RN  Outcome: Progressing  Flowsheets (Taken 11/20/2024 1915)  Free From Fall Injury: Instruct family/caregiver on patient safety  11/20/2024 1133 by Angie Mercado RN  Outcome: Progressing     Problem: Discharge Planning  Goal: Discharge to home or other facility with appropriate resources  Description: Patient has support from mother and children.  Possible d/c to ARU  11/21/2024 0043 by Ayesha Holloway RN  Outcome: Progressing  11/21/2024 0042 by Ayesha Holloway RN  Outcome: Progressing  Flowsheets (Taken 11/20/2024 1915)  Discharge to home or other facility with appropriate resources:   Identify barriers to discharge with patient and caregiver   Arrange for needed discharge resources and transportation as appropriate  11/20/2024 1133 by Angie Mercado RN  Outcome: Progressing     Problem: Pain  Goal: Verbalizes/displays adequate comfort level or baseline comfort level  Description: Patients pain in R side of mouth  Pt given Percocet before lunchtime.  Pain improved from yesterday.  educated to notify for worsening pain  11/21/2024 0043 by Ayesha Holloway RN  Outcome: Progressing  11/21/2024 0042 by Ayesha Holloway RN  Outcome: Progressing  Flowsheets (Taken 11/20/2024 2330)  Verbalizes/displays adequate comfort level or baseline comfort level:   Encourage patient to monitor pain and request assistance   Assess pain using appropriate pain scale  11/20/2024 1133 by Angie Mercado RN  Outcome: Progressing     Problem: Skin/Tissue Integrity  Goal: Absence of new skin breakdown  Description: Monitor for areas of redness and/or skin breakdown    11/21/2024 0043 by Ayesha Holloway RN  Outcome: Progressing  11/21/2024 0042 by Ayesha Holloway RN  Outcome: Progressing  11/20/2024 1133  by Biando, Angie, RN  Outcome: Progressing     Problem: ABCDS Injury Assessment  Goal: Absence of physical injury  Description: Safety precautions in place to prevent injury  11/21/2024 0043 by Ayesha Holloway RN  Outcome: Progressing  11/21/2024 0042 by Ayesha Holloway RN  Outcome: Progressing  Flowsheets (Taken 11/20/2024 1915)  Absence of Physical Injury: Implement safety measures based on patient assessment  11/20/2024 1133 by Angie Mercado RN  Outcome: Progressing     Problem: SLP Adult - Impaired Swallowing  Goal: By Discharge: Advance to least restrictive diet without signs or symptoms of aspiration for planned discharge setting.  See evaluation for individualized goals.  Description: Patient will:  1. Tolerate PO trials with 0 s/s overt distress in 4/5 trials  2. Utilize compensatory swallow strategies/maneuvers (decrease bite/sip, size/rate, alt. liq/sol) with min cues in 4/5 trials    Rec:     Full thin liquids  Aspiration precautions  HOB >45 during po intake, remain >30 for 30-45 minutes after po   Small bites/sips; alternate liquid/solid with slow feeding rate   Oral care TID  Meds per pt preference      11/20/2024 1333 by Pili Gonzalez, SLP  Outcome: Progressing     Problem: Physical Therapy - Adult  Goal: By Discharge: Performs mobility at highest level of function for planned discharge setting.  See evaluation for individualized goals.  Description: Physical Therapy Goals  Initiated 11/19/2024 and to be accomplished within 7 day(s)  1.  Patient will move from supine to sit and sit to supine in bed with supervision/set-up in preparation for seated tasks.    2.  Patient will transfer from bed to chair and chair to bed with minimal assistance using the least restrictive device in preparation for out of bed.  3.  Patient will perform sit to stand with minimal assistance in preparation for out of bed tasks.  4.  Patient will ambulate with minimal assistance for 25 feet with the least

## 2024-11-21 NOTE — PROGRESS NOTES
ARU/IPR REFERRAL CONTACT NOTE  [facility]      Thank you for the opportunity to review this patient's case for admission to [facility].    Referral received: 11/19/2024 time:1200pm    Based on our pre-admission screening:                          [x ] This patient meets criteria for admission to [facility].    Pt cleared for DC to ARU  Plan to admit patient to ARU room 175@3pm  Please call report to [phone] prior to transfer  Appreciate completed DC summary in the chart prior to transfer.     Again, Thank you for this referral. Should you have any questions please do not hesitate to call.     Sincerely,  Irasema Gasca    Clinical Liaison  [facility]  [phone]

## 2024-11-21 NOTE — PROGRESS NOTES
ARU/IPR REFERRAL CONTACT NOTE  [facility]      Thank you for the opportunity to review this patient's case for admission to [facility].    Referral received: 11/19/2024 time:1200pm    Based on our pre-admission screening:                          [ x] Our Team/Medical Director is following this case.   Comments: Received authorization for ARU from insurance.   Shared with CM who reports will follow up with MD for medical clearance. Will continue to follow.    Again, Thank you for this referral. Should you have any questions please do not hesitate to call.     Sincerely,  Irasema Gasca    Clinical Liaison  [facility]  [phone]

## 2024-11-21 NOTE — PLAN OF CARE
Problem: Safety - Adult  Goal: Free from fall injury  Description: Fall precautions in place. Family at bedside  11/21/2024 1415 by Julito Starr RN  Outcome: Progressing     Problem: Discharge Planning  Goal: Discharge to home or other facility with appropriate resources  Description: Patient has support from mother and children.  Possible d/c to ARU  11/21/2024 1415 by Julito Starr RN  Outcome: Progressing     Problem: Pain  Goal: Verbalizes/displays adequate comfort level or baseline comfort level  Description: Patients pain in R side of mouth  Pt given Percocet before lunchtime.  Pain improved from yesterday.  educated to notify for worsening pain  11/21/2024 1415 by Julito Starr RN  Outcome: Progressing  Flowsheets (Taken 11/21/2024 1415)  Verbalizes/displays adequate comfort level or baseline comfort level:   Implement non-pharmacological measures as appropriate and evaluate response   Administer analgesics based on type and severity of pain and evaluate response   Assess pain using appropriate pain scale   Encourage patient to monitor pain and request assistance   Notify Licensed Independent Practitioner if interventions unsuccessful or patient reports new pain  Note: Assess pain using appropriate pain scale. Instructed patient to notify of pain felt using appropriate pain scale.     Problem: Skin/Tissue Integrity  Goal: Absence of new skin breakdown  Description: Monitor for areas of redness and/or skin breakdown    11/21/2024 1415 by Julito Starr RN  Outcome: Progressing     Problem: ABCDS Injury Assessment  Goal: Absence of physical injury  Description: Safety precautions in place to prevent injury  11/21/2024 1415 by Julito Starr RN  Outcome: Progressing     Problem: SLP Adult - Impaired Swallowing  Goal: By Discharge: Advance to least restrictive diet without signs or symptoms of aspiration for planned discharge setting.  See evaluation for individualized goals.  Description:  Patient will:  1. Tolerate PO trials with 0 s/s overt distress in 4/5 trials  2. Utilize compensatory swallow strategies/maneuvers (decrease bite/sip, size/rate, alt. liq/sol) with min cues in 4/5 trials    Rec:     Easy to chew with thin liquids  Aspiration precautions  HOB >45 during po intake, remain >30 for 30-45 minutes after po   Small bites/sips; alternate liquid/solid with slow feeding rate   Oral care TID  Meds per pt preference      11/21/2024 1115 by Pili Gonzalez, SLP  Outcome: Progressing     Problem: Neurosensory - Adult  Goal: Achieves maximal functionality and self care  Description: Patient had stroke-like symptoms on admission.  Pt R sided weakness improving, able to get up to chair today and use walker  11/21/2024 1415 by Julito Starr RN  Outcome: Progressing     Problem: Musculoskeletal - Adult  Goal: Return mobility to safest level of function  Description: Assist patient with ADLs, administer pain medication to promote participation with PT/OT.  Pt able to stand and get to chair this AM  11/21/2024 1415 by Julito Starr, RN  Outcome: Progressing     Problem: Infection - Adult  Goal: Absence of infection at discharge  Description: Administer IV Antibiotics as ordered. Monitor for fever and WBC count  11/21/2024 1415 by Julito Starr RN  Outcome: Progressing  Flowsheets (Taken 11/21/2024 1415)  Absence of infection at discharge:   Monitor lab/diagnostic results   Assess and monitor for signs and symptoms of infection   Monitor all insertion sites i.e., indwelling lines, tubes and drains   Administer medications as ordered   Instruct and encourage patient and family to use good hand hygiene technique  Note: Will monitor for signs and symptoms of infection. Educated patient on signs of infection.

## 2024-11-21 NOTE — PLAN OF CARE
Problem: SLP Adult - Impaired Swallowing  Goal: By Discharge: Advance to least restrictive diet without signs or symptoms of aspiration for planned discharge setting.  See evaluation for individualized goals.  Description:     Patient will:  1. Tolerate PO trials with 0 s/s overt distress in 4/5 trials  2. Utilize compensatory swallow strategies/maneuvers (decrease bite/sip, size/rate, alt. liq/sol) with min cues in 4/5 trials    Rec:     Easy to chew with thin liquids  Aspiration precautions  HOB >45 during po intake, remain >30 for 30-45 minutes after po   Small bites/sips; alternate liquid/solid with slow feeding rate   Oral care TID  Meds per pt preference      Outcome: Progressing   SPEECH LANGUAGE PATHOLOGY DYSPHAGIA TREATMENT    Patient: Shaila Martinez (44 y.o. female)  Date: 11/21/2024  Diagnosis: Phlegmon [L02.91]  Dental caries [K02.9]  Stroke-like symptom [R29.90]  Ischemic stroke (HCC) [I63.9] Stroke-like symptom      Precautions: Standard, aspiration  PLOF: As per H&P      ASSESSMENT:  Pt was seen at bedside for follow up dysphagia management. She demonstrated improved mandibular ROM this date with improved speech production. Pt stated that she has been able to tolerate ~25-50% of easy to chew meal trays. This AM, she tolerated reg solids and thin liquids without any overt s/sx of aspiration. Mastication was labored with positive oral clearance during cyclic ingestion. Laryngeal elevation was functional/timely to palpation. Mandibular ROM exercises provided - pt completed 10 rep x 1 mod cues. Encouraged pt to continue exercises daily. Recommend to continue easy to chew with thin liquids, aspiration precautions, oral care TID, and meds as tolerated. ST will continue to follow for further dysphagia management.     Progression toward goals:  []         Improving appropriately and progressing toward goals  [x]         Improving slowly and progressing toward goals  []         Not making progress toward  goals and plan of care will be adjusted     PLAN:  Recommendations and Planned Interventions:  Recommendations  Requires SLP Intervention: Yes  Recommendations: Self feed;Dysphagia treatment  D/C Recommendations: Ongoing speech therapy is recommended during this hospitalization  Diet Solids Recommendation: Easy to Chew  Liquid Consistency Recommendation: Thin  Compensatory Swallowing Strategies : Eat/Feed slowly;Upright as possible for all oral intake;Remain upright for 30-45 minutes after meals;Small bites/sips;Alternate solids and liquids;External pacing  Recommended Form of Meds: PO  Therapeutic Interventions: Diet tolerance monitoring;Patient/Family education  Patient Education: Pt educated on safe swallow techniques and aspiration precautions  Patient Education Response: Verbalizes understanding;Needs reinforcement    Patient continues to benefit from skilled intervention to address the above impairments. Continue treatment per established plan of care.    Frequency/Duration: Patient will be followed by speech-language pathology 1-2 times per day/3-5 days per week to address goals.    Discharge Recommendations: D/C Recommendations: Ongoing speech therapy is recommended during this hospitalization     SUBJECTIVE:   Patient stated “I'm feeling better each day.”    OBJECTIVE:   Daily Assessment:  Baseline Assessment  Respiratory Status: Room air  O2 Device: None (Room air)  Communication Observation: Functional  Follows Directions: Complex  Current Diet : Easy to chew  Current Liquid Diet : Thin  Dentition: Adequate  Patient Positioning: Upright in bed  Baseline Vocal Quality: Normal  Volitional Cough: Strong    Orientation:  Person, Place, Time, and Situation    Dysphagia Treatment:  Oral Assessment:  Oral Motor   Labial: No impairment  Dentition: Natural  Oral Hygiene: Moist, Clean  Lingual: Decreased strength  Mandible: Restricted        P.O. Trials:  PO Trials  Neuromuscular Estim Used: No  Assessment

## 2024-11-21 NOTE — DISCHARGE SUMMARY
Hospitalist Discharge Summary     Patient ID:  Shaila Martinez  [id number]  44 y.o.  1980    PCP on record: No primary care provider on file.    Admit date: 11/18/2024  Discharge date and time: 11/21/2024    Discharge Diagnoses:    and hospital course;    Shaila Martinez is a 44 y.o. female who presented with pain and suspected dental infection and neurologic changes.  Patient presented prior to the emergency room due to similar right lower jaw pain.  Patient was worked up in the emergency room prior and was given antibiotics and discharged home.  Patient had been taking antibiotics however despite this, continued to feel poorly and so came in to the emergency room again.  In the emergency room patient worked up again, found to have phlegmon suspected from dental infection.  During evaluation was noted to have weakness in the right upper and lower extremity along with right facial paresthesia.  Given such code stroke initiated in the emergency room and patient worked up further.  CT and CTA head and neck were negative overall.  No other significant findings noted on laboratory workup.  Given such patient was started on antibiotics.  Given this facility does not have OMFS, ENT was consulted who stated they did not have any procedure that they felt they would be able to do with this patient.  [facility] and [facility] Were both consulted, reviewed the case and stated that did not feel as though they would need to do any type of OMFS procedure and so declined transfer.  Infectious disease was consulted on our service and patient admitted for CVA workup.     Hospital course.;    Patient was seen by neurology and MRI of the head done x 2 was negative for stroke.;    IMPRESSION:     1. Contrast-enhanced images through the brain show no abnormal parenchymal or  meningeal enhancement. No mass. No focal findings to explain right-sided  weakness.  2. Partially imaged right-sided facial swelling, more  completely imaged on the  previous day dedicated CT soft tissue neck with contrast.    Discussed with neurology; he thinks the right-sided weakness was from a severe pain from the dental infection is more psychogenic he did not think his TIA so okay to stop the aspirin and statin.    Patient is now improving able to eat less pain and swelling on the right jaw stable to be discharged to rehab;      A/p;                                           Right first molar dental abscess with associated soft tissue swelling and cellulitis to the right lower face:               -No drainable fluid collection noted on CT of the head and neck or CTA.              -OMFS contacted by the ED and advised against any surgical procedure at this time.;  Plan  Swelling is much better with IV Unasyn.;  Now patient able to open mouth and eat less pain on the right side    Discussed with ID will change the patient to p.o. Augmentin treat for 2 weeks;  Patient will need outpatient    Needs to see a dentist or OMFS after completion of her antibiotics in 2 weeks.      Right upper and lower extremity weakness: Stroke is ruled out MRI negative x 2;    Neurology on the case;  no TIA;   Right-sided weakness from the severe pain from the right dental infection ok to stop aspirin and statin    IMPRESSION:     1. Contrast-enhanced images through the brain show no abnormal parenchymal or  meningeal enhancement. No mass. No focal findings to explain right-sided  weakness.  2. Partially imaged right-sided facial swelling, more completely imaged on the  previous day dedicated CT soft tissue neck with contrast.    CTA;         IMPRESSION:  1. Patent intracranial brain arteries. No LVO.  2. No significant stenosis in cervical carotid or vertebral arteries.  3. Right facial soft tissue swelling overlying the body of the right mandible.  Underlying periapical lucency involving the roots of the right mandibular first  molar tooth, potential acute dental  infection. Facial soft tissue swelling also  seen and described on CT soft tissue neck performed same day and dictated  separately.    Consults: ID, neurology, and ENT        Pertinent Lab Data:  Recent Labs     11/18/24  1320 11/20/24  0123 11/21/24  0635   WBC 10.9 10.1 6.8   HGB 12.7 11.2* 11.8*   HCT 38.2 33.5* 36.4    319 334     Recent Labs     11/18/24  1320 11/19/24  0712    138   K 3.5 3.7    110   CO2 25 21   BUN 5* 8   ALT 23  --    INR 1.1  --        DISCHARGE MEDICATIONS:  @     Medication List        START taking these medications      amoxicillin-clavulanate 875-125 MG per tablet  Commonly known as: AUGMENTIN  Take 1 tablet by mouth every 12 hours for 28 doses            CHANGE how you take these medications      oxyCODONE-acetaminophen 5-325 MG per tablet  Commonly known as: PERCOCET  Take 2 tablets by mouth every 6 hours as needed for Pain for up to 3 days. Max Daily Amount: 8 tablets  What changed:   how much to take  additional instructions            STOP taking these medications      diclofenac 50 MG EC tablet  Commonly known as: VOLTAREN               Where to Get Your Medications        These medications were sent to [facility]/pharmacy #[id number] - [place], [place] - [address] - P [phone] - F [phone]  [address] ([place]), [facility] [address]      Phone: [phone]   amoxicillin-clavulanate 875-125 MG per tablet       Information about where to get these medications is not yet available    Ask your nurse or doctor about these medications  oxyCODONE-acetaminophen 5-325 MG per tablet             My Recommended Diet, Activity, Wound Care, and follow-up labs are listed in the patient's Discharge Insturctions which I have personally completed and reviewed.      Disposition:     [] Home with family     [] HH PT/RN   [] SNF/NH   [x] Inpatient Rehab/JAMIE  Condition at Discharge:  Stable    Follow up with:   PCP : No primary care provider on file.        >30 minutes  spent coordinating this discharge (review instructions/follow-up, prescriptions, preparing report for sign off)    Signed:  Aysha Michelle MD  11/21/2024  12:55 PM

## 2024-11-21 NOTE — PROGRESS NOTES
[facility]  (A Division of [facility])      Consultation Note      Date of Admission: 11/18/2024    Date of Note: 11/21/2024      Reason for Referral: Dental abscess and right facial swelling  Referring Physician: Dr. Megan Hwang from this admission:   None    Current Antimicrobials:    Prior Antimicrobials:  Unasyn 11/18 to present  Clindamycin 11/18 to present        Assessment:         Right first molar dental abscess with associated soft tissue swelling and cellulitis to the right lower face:    -No drainable fluid collection noted on CT of the head and neck or CTA.   -OMFS contacted by the ED and advised against any surgical procedure at this time.    Right upper and lower extremity weakness: Admitted for stroke workup.  MRI of brain pending.    Plan:   D/c unasyn  Transition to Augmentin 875 p.o. twice daily.   -Stop date 12/1  Needs to see a dentist or OMFS after completion of her antibiotics in 2 weeks.    Trend CBC, BMP  Discussed with Dr. Miguel Angel Engel for discharge from ID per    Gael Daniel DO  [facility]  [address], [address], [place] [address]  Office: [phone], Ext [phone]    Subjective:   Continues to slowly feel better.  Pain and swelling to her right face is improved.  She is able to open her jaw and eat.  Right sided weakness also slowly improving.  No fevers or chills.  Awaiting discharge to ARU later today.    Tmax 99.9  WBC 6.8    HPI:  Ms. Martinez is a 44-year-old female with a history of seizure disorder who came to the emergency department with complaints of neurological changes as well as right jaw pain.  She says that her jaw pain initially had started on Saturday when she had some tenderness and pain in the lower right anterior jaw.  She had gone to the emergency department and gotten some Percocet as well as amoxicillin.  She had made an appointment to go to the dentist but they  wanted her to complete her antibiotics before they would perform any procedures.  The pain and swelling had acutely worsened  night into Monday morning.  She then started having fevers and chills as well for the last 24 hours.  Additionally, she was having some weakness in the right upper and lower extremity with right sided facial paresthesias.  A code stroke was called and she had a CT of the head and neck.  As part of that workup, her CT scan had revealed some right facial soft tissue swelling over the right mandible with underlying periapical lucency in the roots of the right mandibular first molar consistent with an acute dental infection.  No drainable abscess was noted.  The ED has contacted OMFS at [facility] as well as [facility] and they had declined transfer and stated that no additional intervention was needed at this time outside of antibiotics.  When the patient was seen today, she continues to have ongoing pain in the right jaw however she feels as though the swelling is about 50% better than what it had been yesterday evening.  She is not having any fevers or chills.  She still having a difficult time opening her mouth secondary to pain.  She does not remark upon any kind of right sided weakness in her upper or lower extremities at this time.           Objective:      /83   Pulse 66   Temp 98.4 °F (36.9 °C) (Oral)   Resp 20   Ht 1.727 m (5' 8\")   Wt 92.1 kg (203 lb)   LMP 2024 (Approximate)   SpO2 97%   BMI 30.87 kg/m²   Temp (24hrs), Av.5 °F (36.9 °C), Min:97.8 °F (36.6 °C), Max:99.9 °F (37.7 °C)        General:   awake alert and oriented, appears stated age   Skin:   no rashes or skin lesions noted on limited exam, no jaundice   HEENT:  Normocephalic, atraumatic, PERRL, EOMI, no scleral icterus; no conjunctival hemmohage; modest amount of swelling to the anterior middle portion of the right mandible, tender to touch, very slight overlying erythema but no other notable skin

## 2024-11-21 NOTE — PROGRESS NOTES
4 Eyes Skin Assessment     NAME:  Shaila Martinez  YOB: 1980  MEDICAL RECORD NUMBER:  [id number]    The patient is being assessed for  Shift Handoff    I agree that at least one RN has performed a thorough Head to Toe Skin Assessment on the patient. ALL assessment sites listed below have been assessed.      Areas assessed by both nurses:    Head, Face, Ears, Shoulders, Back, Chest, Arms, Elbows, Hands, Sacrum. Buttock, Coccyx, Ischium, Legs. Feet and Heels, and Under Medical Devices         Does the Patient have a Wound? No noted wound(s)       Kwan Prevention initiated by RN: No  Wound Care Orders initiated by RN: No    Pressure Injury (Stage 3,4, Unstageable, DTI, NWPT, and Complex wounds) if present, place Wound referral order by RN under : No    New Ostomies, if present place, Ostomy referral order under : No     Nurse 1 eSignature: Electronically signed by Angie Mercado RN on 11/20/24 at 7:15 PM EST    **SHARE this note so that the co-signing nurse can place an eSignature**    Nurse 2 eSignature: Electronically signed by MCKINLEY DOW RN on 11/20/24 at 9:50 PM EST

## 2024-11-21 NOTE — CARE COORDINATION
D/C order noted for today. Orders reviewed. No needs identified at this time.  remains available if needed.    Virginie Duran, DARIUSN, RN  Case Management   [phone]

## 2024-11-21 NOTE — PROGRESS NOTES
Patient has c/o of right side mouth pain and was given ordered Percocet and she was satisfied after taking  Plan to discharge to acute rehab at [facility] and a report given to Isidro who did not have any questions and said was ready for her transfer

## 2024-11-22 LAB
ANION GAP SERPL CALC-SCNC: 3 MMOL/L (ref 3–18)
BASOPHILS # BLD: 0 K/UL (ref 0–0.1)
BASOPHILS NFR BLD: 1 % (ref 0–2)
BUN SERPL-MCNC: 5 MG/DL (ref 7–18)
BUN/CREAT SERPL: 6 (ref 12–20)
CALCIUM SERPL-MCNC: 8.8 MG/DL (ref 8.5–10.1)
CHLORIDE SERPL-SCNC: 111 MMOL/L (ref 100–111)
CO2 SERPL-SCNC: 24 MMOL/L (ref 21–32)
CREAT SERPL-MCNC: 0.9 MG/DL (ref 0.6–1.3)
DIFFERENTIAL METHOD BLD: ABNORMAL
EOSINOPHIL # BLD: 0.5 K/UL (ref 0–0.4)
EOSINOPHIL NFR BLD: 6 % (ref 0–5)
ERYTHROCYTE [DISTWIDTH] IN BLOOD BY AUTOMATED COUNT: 12.8 % (ref 11.6–14.5)
GLUCOSE SERPL-MCNC: 97 MG/DL (ref 74–99)
HCT VFR BLD AUTO: 36.8 % (ref 35–45)
HGB BLD-MCNC: 12.3 G/DL (ref 12–16)
IMM GRANULOCYTES # BLD AUTO: 0 K/UL (ref 0–0.04)
IMM GRANULOCYTES NFR BLD AUTO: 0 % (ref 0–0.5)
LYMPHOCYTES # BLD: 3.5 K/UL (ref 0.9–3.6)
LYMPHOCYTES NFR BLD: 46 % (ref 21–52)
MCH RBC QN AUTO: 28.8 PG (ref 24–34)
MCHC RBC AUTO-ENTMCNC: 33.4 G/DL (ref 31–37)
MCV RBC AUTO: 86.2 FL (ref 78–100)
MONOCYTES # BLD: 0.5 K/UL (ref 0.05–1.2)
MONOCYTES NFR BLD: 6 % (ref 3–10)
NEUTS SEG # BLD: 3.2 K/UL (ref 1.8–8)
NEUTS SEG NFR BLD: 42 % (ref 40–73)
NRBC # BLD: 0 K/UL (ref 0–0.01)
NRBC BLD-RTO: 0 PER 100 WBC
PLATELET # BLD AUTO: 350 K/UL (ref 135–420)
PMV BLD AUTO: 10 FL (ref 9.2–11.8)
POTASSIUM SERPL-SCNC: 3.7 MMOL/L (ref 3.5–5.5)
RBC # BLD AUTO: 4.27 M/UL (ref 4.2–5.3)
SODIUM SERPL-SCNC: 138 MMOL/L (ref 136–145)
WBC # BLD AUTO: 7.6 K/UL (ref 4.6–13.2)

## 2024-11-22 PROCEDURE — 92610 EVALUATE SWALLOWING FUNCTION: CPT

## 2024-11-22 PROCEDURE — 6370000000 HC RX 637 (ALT 250 FOR IP): Performed by: INTERNAL MEDICINE

## 2024-11-22 PROCEDURE — 94761 N-INVAS EAR/PLS OXIMETRY MLT: CPT

## 2024-11-22 PROCEDURE — 92526 ORAL FUNCTION THERAPY: CPT

## 2024-11-22 PROCEDURE — 97535 SELF CARE MNGMENT TRAINING: CPT

## 2024-11-22 PROCEDURE — 1180000000 HC REHAB R&B

## 2024-11-22 PROCEDURE — 85025 COMPLETE CBC W/AUTO DIFF WBC: CPT

## 2024-11-22 PROCEDURE — 97162 PT EVAL MOD COMPLEX 30 MIN: CPT

## 2024-11-22 PROCEDURE — 97116 GAIT TRAINING THERAPY: CPT

## 2024-11-22 PROCEDURE — 97166 OT EVAL MOD COMPLEX 45 MIN: CPT

## 2024-11-22 PROCEDURE — 36415 COLL VENOUS BLD VENIPUNCTURE: CPT

## 2024-11-22 PROCEDURE — 97530 THERAPEUTIC ACTIVITIES: CPT

## 2024-11-22 PROCEDURE — 99223 1ST HOSP IP/OBS HIGH 75: CPT | Performed by: INTERNAL MEDICINE

## 2024-11-22 PROCEDURE — 80048 BASIC METABOLIC PNL TOTAL CA: CPT

## 2024-11-22 PROCEDURE — 97112 NEUROMUSCULAR REEDUCATION: CPT

## 2024-11-22 RX ORDER — ASPIRIN 81 MG/1
81 TABLET ORAL DAILY
Status: DISPENSED | OUTPATIENT
Start: 2024-11-22

## 2024-11-22 RX ORDER — ATORVASTATIN CALCIUM 40 MG/1
80 TABLET, FILM COATED ORAL DAILY
Status: DISPENSED | OUTPATIENT
Start: 2024-11-22

## 2024-11-22 RX ADMIN — OXYCODONE HYDROCHLORIDE AND ACETAMINOPHEN 2 TABLET: 5; 325 TABLET ORAL at 16:59

## 2024-11-22 RX ADMIN — OXYCODONE HYDROCHLORIDE AND ACETAMINOPHEN 2 TABLET: 5; 325 TABLET ORAL at 01:01

## 2024-11-22 RX ADMIN — AMOXICILLIN AND CLAVULANATE POTASSIUM 1 TABLET: 875; 125 TABLET, FILM COATED ORAL at 16:59

## 2024-11-22 RX ADMIN — AMOXICILLIN AND CLAVULANATE POTASSIUM 1 TABLET: 875; 125 TABLET, FILM COATED ORAL at 05:22

## 2024-11-22 RX ADMIN — ASPIRIN 81 MG: 81 TABLET, COATED ORAL at 11:48

## 2024-11-22 RX ADMIN — ATORVASTATIN CALCIUM 80 MG: 40 TABLET, FILM COATED ORAL at 11:48

## 2024-11-22 ASSESSMENT — PAIN SCALES - GENERAL
PAINLEVEL_OUTOF10: 0
PAINLEVEL_OUTOF10: 5
PAINLEVEL_OUTOF10: 0

## 2024-11-22 ASSESSMENT — PAIN DESCRIPTION - PAIN TYPE
TYPE: ACUTE PAIN
TYPE: CHRONIC PAIN

## 2024-11-22 ASSESSMENT — PAIN DESCRIPTION - ORIENTATION
ORIENTATION: RIGHT
ORIENTATION: RIGHT

## 2024-11-22 ASSESSMENT — PAIN DESCRIPTION - ONSET
ONSET: GRADUAL
ONSET: ON-GOING

## 2024-11-22 ASSESSMENT — PAIN DESCRIPTION - FREQUENCY
FREQUENCY: INTERMITTENT
FREQUENCY: CONTINUOUS

## 2024-11-22 ASSESSMENT — PAIN DESCRIPTION - DESCRIPTORS
DESCRIPTORS: ACHING
DESCRIPTORS: ACHING

## 2024-11-22 ASSESSMENT — PAIN DESCRIPTION - LOCATION
LOCATION: FACE
LOCATION: JAW

## 2024-11-22 NOTE — PROGRESS NOTES
4 Eyes Skin Assessment     NAME:  Shaila Martinez  YOB: 1980  MEDICAL RECORD NUMBER:  [id number]    The patient is being assessed for  Shift Handoff    I agree that at least one RN has performed a thorough Head to Toe Skin Assessment on the patient. ALL assessment sites listed below have been assessed.      Areas assessed by both nurses:    Head, Face, Ears, Shoulders, Back, Chest, Arms, Elbows, Hands, Sacrum. Buttock, Coccyx, Ischium, and Legs. Feet and Heels        Does the Patient have a Wound? No noted wound(s)       Kwan Prevention initiated by RN: Yes  Wound Care Orders initiated by RN: No    Pressure Injury (Stage 3,4, Unstageable, DTI, NWPT, and Complex wounds) if present, place Wound referral order by RN under : No    New Ostomies, if present place, Ostomy referral order under : No     Nurse 1 eSignature: Electronically signed by Lisa Brown RN on 11/22/24 at 6:26 AM EST    **SHARE this note so that the co-signing nurse can place an eSignature**    Nurse 2 eSignature: {Esignature:[id number]}

## 2024-11-22 NOTE — PLAN OF CARE
Problem: Pain  Goal: Verbalizes/displays adequate comfort level or baseline comfort level  Outcome: Completed     Problem: Safety - Adult  Goal: Free from fall injury  Outcome: Progressing  Flowsheets (Taken 11/22/2024 1402)  Free From Fall Injury:   Instruct family/caregiver on patient safety   Based on caregiver fall risk screen, instruct family/caregiver to ask for assistance with transferring infant if caregiver noted to have fall risk factors     Problem: ABCDS Injury Assessment  Goal: Absence of physical injury  Outcome: Progressing  Flowsheets (Taken 11/21/2024 2346 by Lisa Brown, RN)  Absence of Physical Injury: Implement safety measures based on patient assessment     Pro

## 2024-11-22 NOTE — PROGRESS NOTES
4 Eyes Skin Assessment     NAME:  Shaila Martinez  YOB: 1980  MEDICAL RECORD NUMBER:  [id number]    The patient is being assessed for  Shift Handoff    I agree that at least one RN has performed a thorough Head to Toe Skin Assessment on the patient. ALL assessment sites listed below have been assessed.      Areas assessed by both nurses:    Head, Face, Ears, Shoulders, Back, Chest, Arms, Elbows, Hands, Sacrum. Buttock, Coccyx, Ischium, Legs. Feet and Heels, and Under Medical Devices         Does the Patient have a Wound? No noted wound(s)       Kwan Prevention initiated by RN: Yes  Wound Care Orders initiated by RN: No    Pressure Injury (Stage 3,4, Unstageable, DTI, NWPT, and Complex wounds) if present, place Wound referral order by RN under : No    New Ostomies, if present place, Ostomy referral order under : No     Nurse 1 eSignature: Electronically signed by Genet Fernando RN on 11/22/24 at 5:31 PM EST    **SHARE this note so that the co-signing nurse can place an eSignature**    Nurse 2 eSignature: Electronically signed by Kriss Kidd RN on 11/22/24 at 7:55 PM EST

## 2024-11-22 NOTE — PLAN OF CARE
Problem: Pain  Goal: Verbalizes/displays adequate comfort level or baseline comfort level  Outcome: Progressing  Flowsheets (Taken 11/21/2024 2000)  Verbalizes/displays adequate comfort level or baseline comfort level:   Administer analgesics based on type and severity of pain and evaluate response   Implement non-pharmacological measures as appropriate and evaluate response   Assess pain using appropriate pain scale   Encourage patient to monitor pain and request assistance     Problem: Safety - Adult  Goal: Free from fall injury  Outcome: Progressing     Problem: ABCDS Injury Assessment  Goal: Absence of physical injury  Outcome: Progressing  Flowsheets (Taken 11/21/2024 2346)  Absence of Physical Injury: Implement safety measures based on patient assessment

## 2024-11-22 NOTE — PLAN OF CARE
Problem: Occupational Therapy - Adult  Goal: By Discharge: Performs self-care activities at highest level of function for planned discharge setting.  See evaluation for individualized goals.  Description: Occupational Therapy Goals   Long Term Goals  Initiated (2024) and to be accomplished within 3 week(s)  1. Pt will perform self-feeding with Mod I.  2. Pt will perform grooming with supv.  3. Pt will perform UB bathing with supv.  4. Pt will perform LB bathing with CGA using AE as needed.  5. Pt will perform tub/shower transfer with CGA.   6. Pt will perform UB dressing with supv.  7. Pt will perform LB dressing with CGA using AE as needed.  8. Pt will perform toileting task with CGA.  9. Pt will perform toilet transfer with CGA using least restrictive assistive device.    Short Term Goals   Initiated (2024) and to be accomplished within 7 day(s); (to be reassessed by 2024)  1. Pt will perform self-feeding with set-up.   2. Pt will perform grooming with Min A.  3. Pt will perform UB bathing with Min A.  4. Pt will perform LB bathing with Mod A using AE as needed.  5. Pt will perform tub/shower transfer with Min A.  6. Pt will perform UB dressing with SBA/ Min A.  7. Pt will perform LB dressing with Mod A using AE as needed.  8. Pt will perform toileting task with Min A.  9. Pt will perform toilet transfer with Min A using least restrictive assistive device.    Outcome: Progressing   OCCUPATIONAL THERAPY EVALUATION    Patient: Shaila Martinez 44 y.o.  Date: 2024  Primary Diagnosis: Right sided weakness [R53.1]    Patient identified with name and : yes    Past Medical History:   Past Medical History:   Diagnosis Date    Seizures (HCC)     Syphilis affecting pregnancy      Precautions: Restrictions/Precautions: Fall Risk, General Precautions             Barriers to Learning/Limitations: yes;  physical  Compensate with: visual, verbal, tactile, kinesthetic cues/model     Time In: 0930  Time  washcloth held in left hand. Pt requiring (Mod A) for washing left upper extremity, left axilla, and back due to RUE weakness and impaired functional grasp.     LOWER BODY BATHING Initial Assessment   Functional Level  Maximum assistance   Clinical Factors Patient performed LB bathing (Max A) seated w/c at sink and in stance using sink counter for support and stability. Pt demonstrating ability to bathe upper thighs to mid-calf using washcloth from seated position. Patient requiring assistance to wash and dry distal LE's and B feet. Pt stood (Min A) using sink counter for stability and (Mod A) to wash hips/ sacrum in stance; pt assisted with washing alberta area using washcloth held in left hand.     SHOWER TRANSFER  Initial Assessment   Shower Transfer Functional Level Not tested   Shower Transfer Comments Not tested during initial OT evaluation due to R sided weakness, impaired balance/ coordination, and safety concerns.     UPPER BODY DRESSING/UNDRESSING Initial Assessment   Functional Level Moderate assistance, Minimal assistance   Clinical Factors Patient performed UB dressing Min/ Mod A seated w/c at sink.     LOWER BODY DRESSING/UNDRESSING Initial Assessment   Functional Level Maximum assistance   Clinical Factors Patient performed LB dressing Mod/ Max A seated w/c level and in stance using sink counter for support and stability. Pt demonstrating requiring Max A to thread mesh underwear over distal LE's and Mod A to thread pant legs. Pt stood (Min A) using sink counter for support with Mod A to pull underwear and pants up over hips to waist. Pt requiring Max A for donning slipper socks. Pt will benefit from use of AE for improved safety and functional (I) with LB ADL's.     TOILETING Initial Assessment   Functional Level Moderate assistance   Clinical factors Pt performed hygiene and clothing management (Mod A) using simulated toileting task; use of extra wide platform commode positioned over toilet.     TOILET

## 2024-11-22 NOTE — PROGRESS NOTES
4 Eyes Skin Assessment     NAME:  Shaila Martinez  YOB: 1980  MEDICAL RECORD NUMBER:  [id number]    The patient is being assessed for  Shift Handoff    I agree that at least one RN has performed a thorough Head to Toe Skin Assessment on the patient. ALL assessment sites listed below have been assessed.      Areas assessed by both nurses:    Head, Face, Ears, Shoulders, Back, Chest, Arms, Elbows, Hands, Sacrum. Buttock, Coccyx, Ischium, and Legs. Feet and Heels        Does the Patient have a Wound? No noted wound(s)       Kwan Prevention initiated by RN: Yes  Wound Care Orders initiated by RN: No    Pressure Injury (Stage 3,4, Unstageable, DTI, NWPT, and Complex wounds) if present, place Wound referral order by RN under : No    New Ostomies, if present place, Ostomy referral order under : No     Nurse 1 eSignature: Electronically signed by Isidro Sherman RN on 11/21/24 at 8:05 PM EST    **SHARE this note so that the co-signing nurse can place an eSignature**    Nurse 2 eSignature: Electronically signed by Lisa Brown RN on 11/22/24 at 6:26 AM EST

## 2024-11-22 NOTE — PROGRESS NOTES
Reason for Admission to Rehab: Right sided weakness [R53.1]       PCP: First and Last name:  None     Preferred Pharmacy: [facility] on [address]     COVID vaccine: yes                            Top Challenges facing patient (as identified by patient/family and CM):                               Transportation? Patient states that prior to admission, she was driving herself to her appointments.               Support system or lack thereof? Significant other and children                     Living arrangements? Patient lives with her significant other and her 2 daughters.            Self-care/ADLs/Cognition?  Prior to admission, patient was independent with her mobility and ADL's. She was also working full time at [employer].      Home medical equipment? none          Current Advanced Directive/Advance Care Plan:  Full Code No additional code details      Healthcare Decision Maker: Self      Payor Source Payor: [facility] MEDICAID / Plan: [facility] CARDINAL CARE / Product Type: *No Product type* /       Caregiver designated for family/ caregiver education sessions with team: Patient states that her SO, Ronny Wick ([phone]) will be able to help her once she is discharged. Patient gave verbal consent to allowing staff to call and schedule CGE with him when needed.                             Plan for utilizing home health: Patient states that she has never had home health or been in a SNF in the past to receive therapy.                    Transition of Care Plan:    Home                Ayesha Mathis RN

## 2024-11-22 NOTE — PROGRESS NOTES
[facility]  (A Division of [facility])      Consultation Note      Date of Admission: 11/21/2024    Date of Note: 11/22/2024      Reason for Referral: Dental abscess and right facial swelling  Referring Physician: Dr. eMgan Hwang from this admission:   None    Current Antimicrobials:    Prior Antimicrobials:  Augmentin 11/21 to present Unasyn 11/18 to 11/21  Clindamycin 11/18 to 11/19       Assessment:         Right first molar dental abscess with associated soft tissue swelling and cellulitis to the right lower face:    -No drainable fluid collection noted on CT of the head and neck or CTA.   -OMFS contacted by the ED and advised against any surgical procedure at this time.    Right upper and lower extremity weakness: Admitted for stroke workup.  MRI of brain pending.    Plan:   Continue Augmentin 875 p.o. twice daily.   -Stop date 12/1  Needs to see a dentist or OMFS after completion of her antibiotics in 2 weeks.  Family is helping to arrange this.    Trend CBC, BMP 1-2 times per week      Gael Daniel DO  [facility]  [address], [address] [address]  Office: [phone], Ext [phone]    Subjective:   Continues to slowly feel better.  Pain and swelling continues to improve.  Able to open her mouth further. Feels stronger.     Tmax 99.2  WBC 7.6    HPI:  Ms. Martinez is a 44-year-old female with a history of seizure disorder who came to the emergency department with complaints of neurological changes as well as right jaw pain.  She says that her jaw pain initially had started on Saturday when she had some tenderness and pain in the lower right anterior jaw.  She had gone to the emergency department and gotten some Percocet as well as amoxicillin.  She had made an appointment to go to the dentist but they wanted her to complete her antibiotics before they would perform any procedures.  The pain and swelling had  acutely worsened  night into Monday morning.  She then started having fevers and chills as well for the last 24 hours.  Additionally, she was having some weakness in the right upper and lower extremity with right sided facial paresthesias.  A code stroke was called and she had a CT of the head and neck.  As part of that workup, her CT scan had revealed some right facial soft tissue swelling over the right mandible with underlying periapical lucency in the roots of the right mandibular first molar consistent with an acute dental infection.  No drainable abscess was noted.  The ED has contacted OMFS at [facility] as well as [facility] and they had declined transfer and stated that no additional intervention was needed at this time outside of antibiotics.  When the patient was seen today, she continues to have ongoing pain in the right jaw however she feels as though the swelling is about 50% better than what it had been yesterday evening.  She is not having any fevers or chills.  She still having a difficult time opening her mouth secondary to pain.  She does not remark upon any kind of right sided weakness in her upper or lower extremities at this time.           Objective:      /81   Pulse 71   Temp 98.2 °F (36.8 °C) (Oral)   Resp 17   Ht 1.727 m (5' 8\")   Wt 89.8 kg (198 lb)   LMP 2024 (Approximate)   SpO2 96%   BMI 30.11 kg/m²   Temp (24hrs), Av.5 °F (36.9 °C), Min:98 °F (36.7 °C), Max:99.2 °F (37.3 °C)        General:   awake alert and oriented, appears stated age   Skin:   no rashes or skin lesions noted on limited exam, no jaundice   HEENT:  Normocephalic, atraumatic, PERRL, EOMI, no scleral icterus; no conjunctival hemmohage; improved swelling to the anterior middle portion of the right mandible,  to touch,  no other notable skin changes.     Lymph Nodes:   no cervical or inguinal adenopathy   Lungs:   non-labored, bilaterally clear to aspiration   Heart:  RRR, s1 and s2; no

## 2024-11-22 NOTE — PROGRESS NOTES
Shaila Martinez is a 44 y.o.  female admitted on 11/21/2024 from [address] [place] [address]. Report received from LIZZY Terry. Transportation was provided bed, and the patient was transferred to her room via  bed . Patient was assisted to bed with assist of 1. The patient was oriented to her room. Fall risk precautions were discussed with the patient; she was instructed to call for help prior to getting up. The following fall risk precautions were initiated: bed/ chair alarms, door signage, yellow bracelet and socks. Four eyes nurse skin assessment was performed by LIZZY Felix and AMINAH Morris. Skin problems noted: No.    Isidro Sherman RN

## 2024-11-22 NOTE — PLAN OF CARE
Problem: Physical Therapy - Adult  Goal: By Discharge: Performs mobility at highest level of function for planned discharge setting.  See evaluation for individualized goals.  Description: Physical Therapy Short Term Goals  Initiated 11/22/2024 and to be accomplished within 7 day(s) (11/29)  1.  Patient will supine to sit, sit to supine, roll right, and roll left  in bed with supervision.    2.  Patient will transfer from bed to chair and chair to bed with contact guard assist using the least restrictive device.  3.  Patient will perform sit to stand with contact guard assist.  4.  Patient will ambulate with minimal assistance for 50 feet with the least restrictive device.   5.  Patient will ascend/descend 3 stairs with bilateral handrail(s) with minimal assistance.    Physical Therapy Long Term Goals  Initiated 11/22/2024 and to be accomplished within 21 day(s) (12/13)  1.  Patient will supine to sit, sit to supine, roll right, and roll left in bed with modified independence.    2.  Patient will transfer from bed to chair and chair to bed with supervision/set-up using the least restrictive device.  3.  Patient will perform sit to stand with supervision/set-up.  4.  Patient will ambulate with contact guard assist for 150 feet with the least restrictive device.   5.  Patient will ascend/descend 12 stairs with bilateral handrail(s) with contact guard assist.  Outcome: Progressing       PHYSICAL THERAPY EVALUATION    Patient: Shaila Martinez (44 y.o. female)  Date: 11/22/2024  Diagnosis: Right sided weakness [R53.1]   Precautions: Fall Risk;General Precautions                Chart, physical therapy assessment, plan of care and goals were reviewed.    0730  Time In: 0730  Time Out: 0900  Minutes: 90  Entered Differentiated Treatment minutes: Yes    Patient seen for: PT initial evaluation, therapeutic activity, therapeutic exercise, bed mobility, transfer training, gait training, neuromuscular re-education, stair

## 2024-11-22 NOTE — PLAN OF CARE
Problem: SLP Adult - Impaired Swallowing  Goal: By Discharge: Advance to least restrictive diet without signs or symptoms of aspiration for planned discharge setting.  See evaluation for individualized goals.  Description: Long term goal (to be met by 11/29)  1. Pt will tolerate regular solids with minimal discomfort x2-3 days per pt report.     Short term goal (to be met by 11/29)  -See LTG  -pt not anticipated to require services more than 1-3 sessions  Outcome: Progressing     BEDSIDE SWALLOW evaluation AND TREATMENT    Patient: Shaila Martinez (44 y.o. female)  Date: 11/22/2024  Primary Diagnosis: Right sided weakness [R53.1]       Precautions: Aspiration  PLOF: As per H&P    REASON FOR REFERRAL: Pt was referred for bedside swallow evaluation in setting of \"right lower jaw pain and was found to have phlegmon suspected from dental infection\" and R facial paresthesia. During hospitalization, pt developed R-sided weakness, which persists now, though stroke workup was negative. Pt is currently on an easy to chew solid and thin liquid diet. No concerns noted for speech, language, or cognition. Results of evaluation are as follows:     DYSPHAGIA ASSESSMENT:  Based on the objective data described below, the patient presents with mild oral dysphagia largely d/t reduced mandibular ROM. Pt endorsed ROM and pain levels continue to improve as swelling subsides. Pt indicated she would prefer to advance to regular solids. Recommend advance diet to regular solids, thin liquids, meds as tolerated per pt comfort level. SLP to f/u 1-3 more sessions to ensure safe & comfortable diet tolerance.  Without skilled SLP interventions to address dysphagia, pt is at increased risk of aspiration, malnutrition, dehydration, and compromised general health.      DYSPHAGIA TREATMENT:  Educated pt on easy to chew vs. Regular solids. Discussed rec for diet advancement with option to downgrade if pt experiences too much discomfort.     Patient

## 2024-11-22 NOTE — H&P
[facility]  [address] [place] [id number]     INPATIENT REHABILITATION  HISTORY AND PHYSICAL  (Post Admission Physician Evaluation)    Name: Shaila Martinez CSN: [id number]   Age: 44 y.o.  MRN: [id number]   Sex: female Admit Date: 11/21/2024     PCP: Dr. Skelton primary care provider on file.      Subjective:     Patient seen and examined.    History of the Present Illness: The patient is a 44-year-old female with history of seizure disorder who was admitted to [facility] on November 18, 2024 due to right-sided weakness and right facial swelling.  Patient initially presented to emergency room with complaint of right lower jaw pain and was found to have phlegmon suspected from dental infection.During evaluation was noted to have weakness in the right upper and lower extremity along with right facial paresthesia. Given such code stroke initiated in the emergency room and patient worked up further. CT and CTA head and neck were negative overall. No other significant findings noted on laboratory workup. Given such patient was started on antibiotics. Given this facility does not have OMFS, ENT was consulted who stated they did not have any procedure that they felt they would be able to do with this patient. [facility] and [facility] Were both consulted, reviewed the case and stated that did not feel as though they would need to do any type of OMFS procedure and so declined transfer. Infectious disease was consulted on our service and patient admitted for CVA workup.  Patient was seen by neurologist.  Patient had MRI of brain with contrast that was negative for acute finding to explain her symptoms.  Neurologist did not recommend further intervention other than intense physical and Occupational Therapy.  PT and OT evaluate this patient for impaired mobility and ADLs. Patient was felt to be a good candidate for acute inpatient rehabilitation. Upon

## 2024-11-22 NOTE — PROGRESS NOTES
[facility]  [address] [place] [id number]     INPATIENT REHABILITATION  OVERALL PLAN OF CARE    Name: Shaila Martinez CSN: [id number]   Age: 44 y.o.  MRN: [id number]   Sex: female Admit Date: 11/21/2024       1.  Impaired mobility and ADLs secondary right-sided weakness.  2.  Right first molar abscess associated with soft tissue swelling and cellulitis of the face    ANTICIPATED INTERVENTIONS THAT SUPPORT THE MEDICAL NECESSITY OF THIS ADMISSION:    I. Physical Therapy              A. Intensity: 1-2hour per day              B. Frequency: 5 times per week              C. Duration: 2 weeks              D. Short Term Goals:  1. Patient will supine to sit, sit to supine, roll right, and roll left in bed with supervision.   2. Patient will transfer from bed to chair and chair to bed with contact guard assist using the least restrictive device.  3. Patient will perform sit to stand with contact guard assist.  4. Patient will ambulate with minimal assistance for 50 feet with the least restrictive device.   5. Patient will ascend/descend 3 stairs with bilateral handrail(s) with minimal assistance.               E. Long Term Goals:  1. Patient will supine to sit, sit to supine, roll right, and roll left in bed with modified independence.   2. Patient will transfer from bed to chair and chair to bed with supervision/set-up using the least restrictive device.  3. Patient will perform sit to stand with supervision/set-up.  4. Patient will ambulate with contact guard assist for 150 feet with the least restrictive device.   5. Patient will ascend/descend 12 stairs with bilateral handrail(s) with contact guard assist.    F. Interventions: Pt would benefit from skilled physical therapy in order to improve independent functional mobility within the home. Interventions may include range of motion (AROM, PROM B LE/trunk), motor function (B LE/trunk strengthening/coordination), activity tolerance  training.       PHYSICIAN'S ASSESSMENT OF FINDINGS:    Are the established goals sufficient for achieving the optimal level of function?    [x]  Yes      []  No    What changes would you recommend to the goals as written? None      Are the interventions noted sufficient for achieving the optimal level of function?    [x]  Yes      []  No    What changes would you recommend to the interventions noted? If therapy staff is unable to provide 3 hr of total therapy per day in 5 days due to medical issues or decreased patient tolerance, may modify treatment schedule to 15 hr/week.      Estimated length of stay: 2 weeks      Medical rehabilitation prognosis:    []  Excellent     [x]  Good     []  Fair     []  Guarded       Discharge Destination:     [x]  Home     []  Assisted Living Facility     []  Skilled Nursing Facility     []  [facility]     []  Long Term Care Facility     []  Other:       Signed:    Nolberto Florez MD    November 22, 2024

## 2024-11-23 PROCEDURE — 97530 THERAPEUTIC ACTIVITIES: CPT

## 2024-11-23 PROCEDURE — 97542 WHEELCHAIR MNGMENT TRAINING: CPT

## 2024-11-23 PROCEDURE — 97116 GAIT TRAINING THERAPY: CPT

## 2024-11-23 PROCEDURE — 1180000000 HC REHAB R&B

## 2024-11-23 PROCEDURE — 6370000000 HC RX 637 (ALT 250 FOR IP): Performed by: INTERNAL MEDICINE

## 2024-11-23 PROCEDURE — 97112 NEUROMUSCULAR REEDUCATION: CPT

## 2024-11-23 PROCEDURE — 97110 THERAPEUTIC EXERCISES: CPT

## 2024-11-23 PROCEDURE — 94761 N-INVAS EAR/PLS OXIMETRY MLT: CPT

## 2024-11-23 RX ADMIN — AMOXICILLIN AND CLAVULANATE POTASSIUM 1 TABLET: 875; 125 TABLET, FILM COATED ORAL at 17:00

## 2024-11-23 RX ADMIN — ASPIRIN 81 MG: 81 TABLET, COATED ORAL at 08:19

## 2024-11-23 RX ADMIN — AMOXICILLIN AND CLAVULANATE POTASSIUM 1 TABLET: 875; 125 TABLET, FILM COATED ORAL at 05:07

## 2024-11-23 RX ADMIN — ATORVASTATIN CALCIUM 80 MG: 40 TABLET, FILM COATED ORAL at 08:19

## 2024-11-23 ASSESSMENT — PAIN SCALES - WONG BAKER: WONGBAKER_NUMERICALRESPONSE: NO HURT

## 2024-11-23 ASSESSMENT — PAIN SCALES - GENERAL
PAINLEVEL_OUTOF10: 0

## 2024-11-23 NOTE — PLAN OF CARE
Problem: Physical Therapy - Adult  Goal: By Discharge: Performs mobility at highest level of function for planned discharge setting.  See evaluation for individualized goals.  Description: Physical Therapy Short Term Goals  Initiated 11/22/2024 and to be accomplished within 7 day(s) (11/29)  1.  Patient will supine to sit, sit to supine, roll right, and roll left  in bed with supervision.    2.  Patient will transfer from bed to chair and chair to bed with contact guard assist using the least restrictive device.  3.  Patient will perform sit to stand with contact guard assist.  4.  Patient will ambulate with minimal assistance for 50 feet with the least restrictive device.   5.  Patient will ascend/descend 3 stairs with bilateral handrail(s) with minimal assistance.    Physical Therapy Long Term Goals  Initiated 11/22/2024 and to be accomplished within 21 day(s) (12/13)  1.  Patient will supine to sit, sit to supine, roll right, and roll left in bed with modified independence.    2.  Patient will transfer from bed to chair and chair to bed with supervision/set-up using the least restrictive device.  3.  Patient will perform sit to stand with supervision/set-up.  4.  Patient will ambulate with contact guard assist for 150 feet with the least restrictive device.   5.  Patient will ascend/descend 12 stairs with bilateral handrail(s) with contact guard assist.  Outcome: Progressing       PHYSICAL THERAPY TREATMENT    Patient: Shaila Martinez (44 y.o. female)  Date: 11/23/2024  Diagnosis: Right sided weakness [R53.1]   Precautions: Fall Risk, General Precautions, R hemiparesis  Chart, physical therapy assessment, plan of care and goals were reviewed.    Time in: 0937  Time out : 1110    Patient seen for: functional transfers, therapeutic exercise, therapeutic activities, neuromuscular education, w/c mobility, gait training, and patient education.      Pain:  Pt pain was reported as 0/10 pre-treatment.  Pt pain was

## 2024-11-23 NOTE — PLAN OF CARE
Problem: Safety - Adult  Goal: Free from fall injury  11/23/2024 0153 by Kriss Kidd, RN  Outcome: Progressing  Flowsheets (Taken 11/22/2024 1949)  Free From Fall Injury: Instruct family/caregiver on patient safety  11/22/2024 1402 by Genet Fernando RN  Outcome: Progressing  Flowsheets (Taken 11/22/2024 1402)  Free From Fall Injury:   Instruct family/caregiver on patient safety   Based on caregiver fall risk screen, instruct family/caregiver to ask for assistance with transferring infant if caregiver noted to have fall risk factors     Problem: ABCDS Injury Assessment  Goal: Absence of physical injury  11/23/2024 0153 by Kriss Kidd RN  Outcome: Progressing  Flowsheets (Taken 11/22/2024 1949)  Absence of Physical Injury: Implement safety measures based on patient assessment  11/22/2024 1402 by Genet Fernando RN  Outcome: Progressing  Flowsheets (Taken 11/21/2024 2346 by Lisa Brown RN)  Absence of Physical Injury: Implement safety measures based on patient assessment     Problem: Neurosensory - Adult  Goal: Achieves maximal functionality and self care  Outcome: Progressing  Flowsheets (Taken 11/22/2024 1915)  Achieves maximal functionality and self care: Encourage and assist patient to increase activity and self care with guidance from physical therapy/occupational therapy     Problem: Musculoskeletal - Adult  Goal: Return mobility to safest level of function  Outcome: Progressing  Flowsheets (Taken 11/22/2024 1915)  Return Mobility to Safest Level of Function:   Assess patient stability and activity tolerance for standing, transferring and ambulating with or without assistive devices   Instruct patient/family in ordered activity level   Assist with transfers and ambulation using safe patient handling equipment as needed     Problem: Musculoskeletal - Adult  Goal: Return ADL status to a safe level of function  Outcome: Progressing

## 2024-11-23 NOTE — PLAN OF CARE
Problem: Safety - Adult  Goal: Free from fall injury  11/23/2024 1005 by Shilpi Camarillo RN  Outcome: Progressing  11/23/2024 0153 by Kriss Kidd RN  Outcome: Progressing  Flowsheets (Taken 11/22/2024 1949)  Free From Fall Injury: Instruct family/caregiver on patient safety     Problem: ABCDS Injury Assessment  Goal: Absence of physical injury  11/23/2024 1005 by Shilpi Camarillo RN  Outcome: Progressing  11/23/2024 0153 by Kriss Kidd RN  Outcome: Progressing  Flowsheets (Taken 11/22/2024 1949)  Absence of Physical Injury: Implement safety measures based on patient assessment

## 2024-11-23 NOTE — PROGRESS NOTES
4 Eyes Skin Assessment     NAME:  Shaila Martinez  YOB: 1980  MEDICAL RECORD NUMBER:  [id number]    The patient is being assessed for  Shift Handoff    I agree that at least one RN has performed a thorough Head to Toe Skin Assessment on the patient. ALL assessment sites listed below have been assessed.      Areas assessed by both nurses:    Sacrum. Buttock, Coccyx, Ischium        Does the Patient have a Wound? No noted wound(s)       Kwan Prevention initiated by RN: Yes  Wound Care Orders initiated by RN: No    Pressure Injury (Stage 3,4, Unstageable, DTI, NWPT, and Complex wounds) if present, place Wound referral order by RN under : No    New Ostomies, if present place, Ostomy referral order under : No     Nurse 1 eSignature: Electronically signed by RIGO MARIN RN on 11/23/24 at 5:46 PM EST    **SHARE this note so that the co-signing nurse can place an eSignature**    Nurse 2 eSignature: Electronically signed by Lisa Brown RN on 11/24/24 at 6:22 AM EST

## 2024-11-24 VITALS
OXYGEN SATURATION: 99 % | RESPIRATION RATE: 20 BRPM | TEMPERATURE: 97.1 F | BODY MASS INDEX: 30.01 KG/M2 | WEIGHT: 198 LBS | SYSTOLIC BLOOD PRESSURE: 118 MMHG | HEART RATE: 89 BPM | HEIGHT: 68 IN | DIASTOLIC BLOOD PRESSURE: 82 MMHG

## 2024-11-24 PROCEDURE — 92526 ORAL FUNCTION THERAPY: CPT

## 2024-11-24 PROCEDURE — 1180000000 HC REHAB R&B

## 2024-11-24 PROCEDURE — 97110 THERAPEUTIC EXERCISES: CPT

## 2024-11-24 PROCEDURE — 97116 GAIT TRAINING THERAPY: CPT

## 2024-11-24 PROCEDURE — 97530 THERAPEUTIC ACTIVITIES: CPT

## 2024-11-24 PROCEDURE — 97535 SELF CARE MNGMENT TRAINING: CPT

## 2024-11-24 PROCEDURE — 97112 NEUROMUSCULAR REEDUCATION: CPT

## 2024-11-24 PROCEDURE — 94761 N-INVAS EAR/PLS OXIMETRY MLT: CPT

## 2024-11-24 PROCEDURE — 6370000000 HC RX 637 (ALT 250 FOR IP): Performed by: INTERNAL MEDICINE

## 2024-11-24 RX ADMIN — ASPIRIN 81 MG: 81 TABLET, COATED ORAL at 08:14

## 2024-11-24 RX ADMIN — OXYCODONE HYDROCHLORIDE AND ACETAMINOPHEN 2 TABLET: 5; 325 TABLET ORAL at 21:36

## 2024-11-24 RX ADMIN — AMOXICILLIN AND CLAVULANATE POTASSIUM 1 TABLET: 875; 125 TABLET, FILM COATED ORAL at 05:09

## 2024-11-24 RX ADMIN — ATORVASTATIN CALCIUM 80 MG: 40 TABLET, FILM COATED ORAL at 08:14

## 2024-11-24 RX ADMIN — AMOXICILLIN AND CLAVULANATE POTASSIUM 1 TABLET: 875; 125 TABLET, FILM COATED ORAL at 17:06

## 2024-11-24 ASSESSMENT — PAIN SCALES - WONG BAKER
WONGBAKER_NUMERICALRESPONSE: NO HURT
WONGBAKER_NUMERICALRESPONSE: NO HURT

## 2024-11-24 ASSESSMENT — PAIN SCALES - GENERAL
PAINLEVEL_OUTOF10: 0
PAINLEVEL_OUTOF10: 8
PAINLEVEL_OUTOF10: 0

## 2024-11-24 ASSESSMENT — PAIN DESCRIPTION - ORIENTATION: ORIENTATION: RIGHT

## 2024-11-24 ASSESSMENT — PAIN DESCRIPTION - PAIN TYPE: TYPE: ACUTE PAIN

## 2024-11-24 ASSESSMENT — PAIN DESCRIPTION - FREQUENCY: FREQUENCY: CONTINUOUS

## 2024-11-24 ASSESSMENT — PAIN DESCRIPTION - DESCRIPTORS: DESCRIPTORS: ACHING

## 2024-11-24 ASSESSMENT — PAIN DESCRIPTION - ONSET: ONSET: GRADUAL

## 2024-11-24 ASSESSMENT — PAIN - FUNCTIONAL ASSESSMENT: PAIN_FUNCTIONAL_ASSESSMENT: ACTIVITIES ARE NOT PREVENTED

## 2024-11-24 ASSESSMENT — PAIN DESCRIPTION - LOCATION: LOCATION: ARM;LEG

## 2024-11-24 NOTE — PLAN OF CARE
Problem: Physical Therapy - Adult  Goal: By Discharge: Performs mobility at highest level of function for planned discharge setting.  See evaluation for individualized goals.  Description: Physical Therapy Short Term Goals  Initiated 2024 and to be accomplished within 7 day(s) ()  1.  Patient will supine to sit, sit to supine, roll right, and roll left  in bed with supervision.    2.  Patient will transfer from bed to chair and chair to bed with contact guard assist using the least restrictive device.  3.  Patient will perform sit to stand with contact guard assist.  4.  Patient will ambulate with minimal assistance for 50 feet with the least restrictive device.   5.  Patient will ascend/descend 3 stairs with bilateral handrail(s) with minimal assistance.    Physical Therapy Long Term Goals  Initiated 2024 and to be accomplished within 21 day(s) ()  1.  Patient will supine to sit, sit to supine, roll right, and roll left in bed with modified independence.    2.  Patient will transfer from bed to chair and chair to bed with supervision/set-up using the least restrictive device.  3.  Patient will perform sit to stand with supervision/set-up.  4.  Patient will ambulate with contact guard assist for 150 feet with the least restrictive device.   5.  Patient will ascend/descend 12 stairs with bilateral handrail(s) with contact guard assist.  Outcome: Progressing     PHYSICAL THERAPY TREATMENT    Patient: Shaila Martinez (44 y.o. female)  Date: 2024  Diagnosis: Right sided weakness [R53.1]   Precautions: fall risk  Chart, physical therapy assessment, plan of care and goals were reviewed.    Time in: 1000  Time out : 1100    Patient seen for: gait training, transfer training, stair training, strengthening, NMRE, and patient education     Pain:  Pt pain was reported as 0 pre-treatment.  Pt pain was reported as 0 post-treatment.    Patient identified with name and : Yes    SUBJECTIVE:

## 2024-11-24 NOTE — PROGRESS NOTES
4 Eyes Skin Assessment     NAME:  Shaila Martinez  YOB: 1980  MEDICAL RECORD NUMBER:  [id number]    The patient is being assessed for  Shift Handoff    I agree that at least one RN has performed a thorough Head to Toe Skin Assessment on the patient. ALL assessment sites listed below have been assessed.      Areas assessed by both nurses:    Head, Face, Ears, Shoulders, Back, Chest, Arms, Elbows, Hands, Sacrum. Buttock, Coccyx, Ischium, and Legs. Feet and Heels        Does the Patient have a Wound? No noted wound(s)       Kwan Prevention initiated by RN: Yes  Wound Care Orders initiated by RN: No    Pressure Injury (Stage 3,4, Unstageable, DTI, NWPT, and Complex wounds) if present, place Wound referral order by RN under : No    New Ostomies, if present place, Ostomy referral order under : No     Nurse 1 eSignature: Electronically signed by Lisa Brown RN on 11/24/24 at 6:23 AM EST    **SHARE this note so that the co-signing nurse can place an eSignature**    Nurse 2 eSignature: Electronically signed by RIGO MARIN RN on 11/24/24 at 10:09 AM EST

## 2024-11-24 NOTE — PROGRESS NOTES
4 Eyes Skin Assessment     NAME:  Shaila Martinez  YOB: 1980  MEDICAL RECORD NUMBER:  [id number]    The patient is being assessed for  Shift Handoff    I agree that at least one RN has performed a thorough Head to Toe Skin Assessment on the patient. ALL assessment sites listed below have been assessed.      Areas assessed by both nurses:    Sacrum. Buttock, Coccyx, Ischium        Does the Patient have a Wound? No noted wound(s)       Kwan Prevention initiated by RN: Yes  Wound Care Orders initiated by RN: No    Pressure Injury (Stage 3,4, Unstageable, DTI, NWPT, and Complex wounds) if present, place Wound referral order by RN under : No    New Ostomies, if present place, Ostomy referral order under : No     Nurse 1 eSignature: Electronically signed by RIGO MARIN RN on 11/24/24 at 6:04 PM EST    **SHARE this note so that the co-signing nurse can place an eSignature**    Nurse 2 eSignature: Electronically signed by Lisa Brown RN on 11/25/24 at 6:17 AM EST

## 2024-11-24 NOTE — PLAN OF CARE
Problem: SLP Adult - Impaired Swallowing  Goal: By Discharge: Advance to least restrictive diet without signs or symptoms of aspiration for planned discharge setting.  See evaluation for individualized goals.  Description: Long term goal (to be met by 11/29)  1. Pt will tolerate regular solids with minimal discomfort x2-3 days per pt report.     Short term goal (to be met by 11/29)  -See LTG  -pt not anticipated to require services more than 1-3 sessions  Outcome: Progressing     SPEECH LANGUAGE PATHOLOGY SPEECH-LANGUAGE   AND SWALLOW TREATMENT    Patient: Shaila Martinez (44 y.o. female)  Date: 11/24/2024  Primary Diagnosis: Right sided weakness [R53.1]       Precautions: Aspiration    ASSESSMENT:    Pt seen for f/u check in to ensure safe and comfortable tolerance following diet advancement to regular solids & thin liquids. Pt and her boyfriend endorsed comfortable tolerance of regular solids, increased PO with diet advancement, no oral residuals, and no s/s of aspiration. Pt expressed preference to continue this diet. SLP to f/u 1 more time to ensure safe and comfortable tolerance. Noted decreased facial swelling and increased mandibular ROM this date.     Progression toward goals:  [x]         Improving appropriately and progressing toward goals  []         Improving slowly and progressing toward goals  []         Not making progress toward goals and plan of care will be adjusted      PLAN:  Recommendations and Planned Interventions:    Patient continues to benefit from skilled intervention to address the above impairments. Continue treatment per established plan of care.    Discharge Recommendations for Follow-up: n/a  DME Recommendations: n/a  Recommended Length of Stay: 1 more session for SLP f/u; defer to PT/OT for d/c date     SUBJECTIVE:   Patient stated “I'm doing a lot better with my eating”. Pt's boyfriend was present for session. He denied any changes in pt's cognition or communication.     OBJECTIVE:

## 2024-11-24 NOTE — PLAN OF CARE
Problem: Physical Therapy - Adult  Goal: By Discharge: Performs mobility at highest level of function for planned discharge setting.  See evaluation for individualized goals.  Description: Physical Therapy Short Term Goals  Initiated 11/22/2024 and to be accomplished within 7 day(s) (11/29)  1.  Patient will supine to sit, sit to supine, roll right, and roll left  in bed with supervision.    2.  Patient will transfer from bed to chair and chair to bed with contact guard assist using the least restrictive device.  3.  Patient will perform sit to stand with contact guard assist.  4.  Patient will ambulate with minimal assistance for 50 feet with the least restrictive device.   5.  Patient will ascend/descend 3 stairs with bilateral handrail(s) with minimal assistance.    Physical Therapy Long Term Goals  Initiated 11/22/2024 and to be accomplished within 21 day(s) (12/13)  1.  Patient will supine to sit, sit to supine, roll right, and roll left in bed with modified independence.    2.  Patient will transfer from bed to chair and chair to bed with supervision/set-up using the least restrictive device.  3.  Patient will perform sit to stand with supervision/set-up.  4.  Patient will ambulate with contact guard assist for 150 feet with the least restrictive device.   5.  Patient will ascend/descend 12 stairs with bilateral handrail(s) with contact guard assist.  11/24/2024 1426 by Greg Cloud, PT  Outcome: Progressing  11/24/2024 1426 by Greg Cloud, PT  Outcome: Progressing  11/24/2024 1013 by Robyn Pérez, PTA  Outcome: Progressing     PHYSICAL THERAPY TREATMENT    Patient: Shaila Martinez (44 y.o. female)  Date: 11/24/2024  Diagnosis: Right sided weakness [R53.1]   Precautions: Fall precautions  Chart, physical therapy assessment, plan of care and goals were reviewed.    Time in: 0845  Time out : 0915    Patient seen for: therapeutic exercise, therapeutic activities, gait training, stair training, NMRE,

## 2024-11-24 NOTE — PLAN OF CARE
Problem: Safety - Adult  Goal: Free from fall injury  11/23/2024 2015 by Lisa Brown RN  Outcome: Progressing  Flowsheets (Taken 11/23/2024 2014)  Free From Fall Injury:   Based on caregiver fall risk screen, instruct family/caregiver to ask for assistance with transferring infant if caregiver noted to have fall risk factors   Instruct family/caregiver on patient safety  11/23/2024 1005 by Shilpi Camarillo, RN  Outcome: Progressing     Problem: ABCDS Injury Assessment  Goal: Absence of physical injury  11/23/2024 2015 by Lisa Brown RN  Outcome: Progressing  Flowsheets (Taken 11/23/2024 2014)  Absence of Physical Injury: Implement safety measures based on patient assessment  11/23/2024 1005 by Shilpi Camarillo, RN  Outcome: Progressing

## 2024-11-24 NOTE — PLAN OF CARE
Problem: Occupational Therapy - Adult  Goal: By Discharge: Performs self-care activities at highest level of function for planned discharge setting.  See evaluation for individualized goals.  Description: Occupational Therapy Goals   Long Term Goals  Initiated (2024) and to be accomplished within 3 week(s)  1. Pt will perform self-feeding with Mod I.  2. Pt will perform grooming with supv.  3. Pt will perform UB bathing with supv.  4. Pt will perform LB bathing with CGA using AE as needed.  5. Pt will perform tub/shower transfer with CGA.   6. Pt will perform UB dressing with supv.  7. Pt will perform LB dressing with CGA using AE as needed.  8. Pt will perform toileting task with CGA.  9. Pt will perform toilet transfer with CGA using least restrictive assistive device.    Short Term Goals   Initiated (2024) and to be accomplished within 7 day(s); (to be reassessed by 2024)  1. Pt will perform self-feeding with set-up.   2. Pt will perform grooming with Min A.  3. Pt will perform UB bathing with Min A.  4. Pt will perform LB bathing with Mod A using AE as needed.  5. Pt will perform tub/shower transfer with Min A.  6. Pt will perform UB dressing with SBA/ Min A.  7. Pt will perform LB dressing with Mod A using AE as needed.  8. Pt will perform toileting task with Min A.  9. Pt will perform toilet transfer with Min A using least restrictive assistive device.    Outcome: Progressing   Occupational Therapy TREATMENT    Patient: Shaila Martinez   44 y.o.    Patient identified with name and : yes    Date: 2024    First Tx Session  Time In: 1138  Time Out: 1223  Second Tx Session  Time In: 1305  Time Out: 1405    Diagnosis: Right sided weakness [R53.1]   Precautions: Restrictions/Precautions: Fall Risk, General Precautions (RUE hemiparesis)             Chart, occupational therapy assessment, plan of care, and goals were reviewed.     Pain:  Intensity Pre-treatment: 0/10   Intensity

## 2024-11-25 PROCEDURE — 1180000000 HC REHAB R&B

## 2024-11-25 PROCEDURE — 97530 THERAPEUTIC ACTIVITIES: CPT

## 2024-11-25 PROCEDURE — 99232 SBSQ HOSP IP/OBS MODERATE 35: CPT | Performed by: HOSPITALIST

## 2024-11-25 PROCEDURE — 97535 SELF CARE MNGMENT TRAINING: CPT

## 2024-11-25 PROCEDURE — 94761 N-INVAS EAR/PLS OXIMETRY MLT: CPT

## 2024-11-25 PROCEDURE — 97110 THERAPEUTIC EXERCISES: CPT

## 2024-11-25 PROCEDURE — 97112 NEUROMUSCULAR REEDUCATION: CPT

## 2024-11-25 PROCEDURE — 92508 TX SP LANG VOICE COMM GROUP: CPT

## 2024-11-25 PROCEDURE — 97116 GAIT TRAINING THERAPY: CPT

## 2024-11-25 PROCEDURE — 6370000000 HC RX 637 (ALT 250 FOR IP): Performed by: INTERNAL MEDICINE

## 2024-11-25 RX ORDER — ACETAMINOPHEN 325 MG/1
650 TABLET ORAL EVERY 4 HOURS PRN
Status: DISCONTINUED | OUTPATIENT
Start: 2024-11-25 | End: 2024-12-06 | Stop reason: HOSPADM

## 2024-11-25 RX ADMIN — ASPIRIN 81 MG: 81 TABLET, COATED ORAL at 08:46

## 2024-11-25 RX ADMIN — AMOXICILLIN AND CLAVULANATE POTASSIUM 1 TABLET: 875; 125 TABLET, FILM COATED ORAL at 16:37

## 2024-11-25 RX ADMIN — AMOXICILLIN AND CLAVULANATE POTASSIUM 1 TABLET: 875; 125 TABLET, FILM COATED ORAL at 05:01

## 2024-11-25 RX ADMIN — ATORVASTATIN CALCIUM 80 MG: 40 TABLET, FILM COATED ORAL at 08:46

## 2024-11-25 ASSESSMENT — PAIN SCALES - GENERAL
PAINLEVEL_OUTOF10: 0

## 2024-11-25 ASSESSMENT — PAIN SCALES - WONG BAKER
WONGBAKER_NUMERICALRESPONSE: NO HURT

## 2024-11-25 NOTE — PROGRESS NOTES
[facility] PHYSICAL REHABILITATION  [address] [place] [id number]     INPATIENT REHABILITATION  DAILY PROGRESS NOTE     Date: 11/25/2024    Name: Shaila Martinez Age / Sex: 44 y.o. / female   CSN: [id number] MRN: [id number]   Admit Date: 11/21/2024 Length of Stay: 4 days     Primary Rehabilitation Diagnosis: 1.  Impaired mobility and ADLs secondary right-sided weakness.    Subjective:     Patient sitting in the wheelchair, feels much better.  States weakness is improving with the therapy.  No new complaints.    Objective:     Vital Signs:  Patient Vitals for the past 24 hrs:   BP Temp Temp src Pulse Resp SpO2   11/25/24 1500 115/78 98.5 °F (36.9 °C) Oral 88 17 97 %   11/25/24 0830 101/74 98.2 °F (36.8 °C) Oral 90 19 95 %   11/24/24 2206 -- -- -- -- 20 --   11/24/24 1939 118/82 97.1 °F (36.2 °C) Oral 89 20 99 %        Physical Examination:  General:  Alert, cooperative, no acute distress    Pulmonary:  CTA Bilaterally. No Wheezing/Rhonchi/Rales.  Cardiovascular: Regular rate and Rhythm.  GI:  Soft, Non distended, Non tender. + Bowel sounds.  Extremities:  No edema, cyanosis, clubbing. No calf tenderness.   Psych: Good insight. Not anxious or agitated.  Neurologic: Alert and oriented X 3.  Right-sided weakness, upper extremity worse than lower extremity    Current Medications:  Current Facility-Administered Medications   Medication Dose Route Frequency    acetaminophen (TYLENOL) tablet 650 mg  650 mg Oral Q4H PRN    aspirin EC tablet 81 mg  81 mg Oral Daily    atorvastatin (LIPITOR) tablet 80 mg  80 mg Oral Daily    amoxicillin-clavulanate (AUGMENTIN) 875-125 MG per tablet 1 tablet  1 tablet Oral 2 times per day    oxyCODONE-acetaminophen (PERCOCET) 5-325 MG per tablet 2 tablet  2 tablet Oral Q6H PRN    polyethylene glycol (GLYCOLAX) packet 17 g  17 g Oral Daily PRN    ondansetron (ZOFRAN) tablet 4 mg  4 mg Oral Q8H PRN       Allergies:  No Known Allergies    Lab/Data Review:  No results found for

## 2024-11-25 NOTE — PROGRESS NOTES
4 Eyes Skin Assessment     NAME:  Shaila Martinez  YOB: 1980  MEDICAL RECORD NUMBER:  [id number]    The patient is being assessed for  Shift Handoff    I agree that at least one RN has performed a thorough Head to Toe Skin Assessment on the patient. ALL assessment sites listed below have been assessed.      Areas assessed by both nurses:    Sacrum. Buttock, Coccyx, Ischium        Does the Patient have a Wound? No noted wound(s)       Kwan Prevention initiated by RN: Yes  Wound Care Orders initiated by RN: No    Pressure Injury (Stage 3,4, Unstageable, DTI, NWPT, and Complex wounds) if present, place Wound referral order by RN under : No    New Ostomies, if present place, Ostomy referral order under : No     Nurse 1 eSignature: Electronically signed by RIGO MARIN RN on 11/25/24 at 6:04 PM EST    **SHARE this note so that the co-signing nurse can place an eSignature**    Nurse 2 eSignature: {Esignature:[id number]}

## 2024-11-25 NOTE — PROGRESS NOTES
[facility]  (A Division of [facility])      Consultation Note      Date of Admission: 11/21/2024    Date of Note: 11/25/2024      Reason for Referral: Dental abscess and right facial swelling  Referring Physician: Dr. Megan Hwang from this admission:   None    Current Antimicrobials:    Prior Antimicrobials:  Augmentin 11/21 to present Unasyn 11/18 to 11/21  Clindamycin 11/18 to 11/19       Assessment:         Right first molar dental abscess with associated soft tissue swelling and cellulitis to the right lower face:    -No drainable fluid collection noted on CT of the head and neck or CTA.   -OMFS contacted by the ED and advised against any surgical procedure at this time.    Right upper and lower extremity weakness: Admitted for stroke workup.  MRI of brain pending.    Plan:   Continue Augmentin 875 p.o. twice daily.   -Stop date 12/1  Needs to see a dentist or OMFS after completion of her antibiotics in 2 weeks.  Family is helping to arrange this.    Trend CBC, BMP 1-2 times per week      Gael Daniel DO  [facility]  [address], [address] [address]  Office: [phone], Ext [phone]    Subjective:   Continues to slowly feel better.  No particular complaints noted.  No fevers.  Feeling better    Tmax 98.2  WBC 7.6    HPI:  Ms. Martinez is a 44-year-old female with a history of seizure disorder who came to the emergency department with complaints of neurological changes as well as right jaw pain.  She says that her jaw pain initially had started on Saturday when she had some tenderness and pain in the lower right anterior jaw.  She had gone to the emergency department and gotten some Percocet as well as amoxicillin.  She had made an appointment to go to the dentist but they wanted her to complete her antibiotics before they would perform any procedures.  The pain and swelling had acutely worsened Sunday night

## 2024-11-25 NOTE — PROGRESS NOTES
4 Eyes Skin Assessment     NAME:  Shaila Martinez  YOB: 1980  MEDICAL RECORD NUMBER:  [id number]    The patient is being assessed for  Shift Handoff    I agree that at least one RN has performed a thorough Head to Toe Skin Assessment on the patient. ALL assessment sites listed below have been assessed.      Areas assessed by both nurses:    Head, Face, Ears, Shoulders, Back, Chest, Arms, Elbows, Hands, Sacrum. Buttock, Coccyx, Ischium, and Legs. Feet and Heels        Does the Patient have a Wound? No noted wound(s)       Kwan Prevention initiated by RN: Yes  Wound Care Orders initiated by RN: No    Pressure Injury (Stage 3,4, Unstageable, DTI, NWPT, and Complex wounds) if present, place Wound referral order by RN under : No    New Ostomies, if present place, Ostomy referral order under : No     Nurse 1 eSignature: Electronically signed by Lisa Brown RN on 11/25/24 at 6:18 AM EST    **SHARE this note so that the co-signing nurse can place an eSignature**    Nurse 2 eSignature: Electronically signed by RIGO MARIN RN on 11/25/24 at 9:26 AM EST

## 2024-11-25 NOTE — PLAN OF CARE
at conclusion of skilled treatment session.     SUBJECTIVE:   Patient stated “the shower and warm water felt great\".    OBJECTIVE DATA SUMMARY:     COGNITION/PERCEPTION Daily Assessment   Overall orientation status Within Normal Limits   Orientation level Oriented X4   Overall cognitive status WFL    Alertness Appears intact   Following Commands Follows multistep commands with increased time    Attention Span Appears intact    Problem Solving Assistance required to implement solutions;Assistance required to generate solutions    Initiation Requires cues for some   Sequencing Requires cues for some     THERAPEUTIC ACTIVITY Daily Assessment    Sitting Balance: Supervision  Standing Balance: Minimal assistance        EATING Daily Assessment   Functional Level Supervision;Setup   Clinical factors Self-care feeding (set-up/ supv) for breakfast tray; regular diet. Patient held cup in left hand to bring up to mouth to take sips from straw. Pt requiring assistance with opening small packets on tray.     GROOMING Daily Assessment   Functional Level Stand by assistance;Verbal cueing;Contact guard assistance   Clinical factors  Patient performed self-care grooming task (SBA/ CGA) w/c level at sink for performing oral hygiene; brushing teeth using toothbrush held in non-dominant left hand. Pt edu/ instructed on positioning of patient's hemiparetic RUE on sink counter for support and to incorporate affected right hand during functional task. Pt washed her face (SBA) using washcloth held in left hand while seated on TTB during ADL shower. Pt applied deodorant SBA with verbal cues and edu for use of compensatory strategies.     UPPER BODY BATHING Daily Assessment   Functional Level Minimal assistance;Verbal cueing   Method Soap and water   Clinical Factors Patient performed UB bathing (Min A) seated on tub transfer bench during ADL shower. Pt demonstrating ability to bathe, chest, abdomen, R axilla, and RUE using washcloth held in

## 2024-11-25 NOTE — PLAN OF CARE
Problem: SLP Adult - Impaired Swallowing  Goal: By Discharge: Advance to least restrictive diet without signs or symptoms of aspiration for planned discharge setting.  See evaluation for individualized goals.  Description: Long term goal (to be met by 11/29)   1. Pt will tolerate regular solids with minimal discomfort x2-3 days per pt report. GOAL MET    Short term goal (to be met by 11/29)  -See LTG  -pt not anticipated to require services more than 1-3 sessions  Outcome: Completed    SPEECH LANGUAGE PATHOLOGY TREATMENT & DISCHARGE SUMMARY      Patient: Shaila Martinez (44 y.o. female)  Date: 11/25/2024  Primary Diagnosis: Right sided weakness [R53.1]       Precautions: Aspiration    ASSESSMENT:  Pt has met 1/1 goals relating to swallowing. Pt has been consuming regular solids and thin liquids for 3 days w/o any reported discomfort, oral pocketing, or s/s of aspiration. Pt expressed content with current diet. Pt's oral dysphagia appears to have resolved has her facial swelling has decreased and mandibular ROM has improved. Pt was also observed to safely feed herself a regular soldid and thin liquid meal w/o any overt s/s of aspiration. No SLP f/u indicated at this time.     Progression toward goals:  [x]         Goals met/approximated  []         Not making progress toward goals and plan of care will be adjusted   []         Other       SUBJECTIVE:   Patient stated “I'm doing much better”.    OBJECTIVE:   Daily Assessment:  Orientation: x4    Discharge Treatment: Initial Assessment Discharge Progress Assessment 11/25/2024    Labial: No impairment  Dentition: Natural  Oral Hygiene: Moist, Clean  Lingual: No impairment  Mandible: Restricted Oral Motor   Labial: No impairment  Dentition: Natural  Oral Hygiene: Moist, Clean  Lingual: No impairment  Mandible: WFL    Vocal Quality: No Impairment  Bolus Acceptance: No impairment  Bolus Formation/Control: Impaired  Type of Impairment: Mastication  Propulsion:

## 2024-11-25 NOTE — PLAN OF CARE
Problem: Physical Therapy - Adult  Goal: By Discharge: Performs mobility at highest level of function for planned discharge setting.  See evaluation for individualized goals.  Description: Physical Therapy Short Term Goals  Initiated 2024 and to be accomplished within 7 day(s) ()  1.  Patient will supine to sit, sit to supine, roll right, and roll left  in bed with supervision.    2.  Patient will transfer from bed to chair and chair to bed with contact guard assist using the least restrictive device.  3.  Patient will perform sit to stand with contact guard assist.  4.  Patient will ambulate with minimal assistance for 50 feet with the least restrictive device.   5.  Patient will ascend/descend 3 stairs with bilateral handrail(s) with minimal assistance.    Physical Therapy Long Term Goals  Initiated 2024 and to be accomplished within 21 day(s) ()  1.  Patient will supine to sit, sit to supine, roll right, and roll left in bed with modified independence.    2.  Patient will transfer from bed to chair and chair to bed with supervision/set-up using the least restrictive device.  3.  Patient will perform sit to stand with supervision/set-up.  4.  Patient will ambulate with contact guard assist for 150 feet with the least restrictive device.   5.  Patient will ascend/descend 12 stairs with bilateral handrail(s) with contact guard assist.  Outcome: Progressing     PHYSICAL THERAPY TREATMENT    Patient: Shaila Martinez (44 y.o. female)  Date: 2024  Diagnosis: Right sided weakness [R53.1]   Precautions: fall risk  Chart, physical therapy assessment, plan of care and goals were reviewed.    Time in: 0930  Time out : 1100    Patient seen for: gait training, transfer training, stair training, therapeutic activities, strengthening, and patient education     Pain:  Pt pain was reported as 0 pre-treatment.  Pt pain was reported as 0 post-treatment.    Patient identified with name and :  No

## 2024-11-25 NOTE — PLAN OF CARE
Problem: Safety - Adult  Goal: Free from fall injury  11/25/2024 0926 by Shilpi Camarillo RN  Outcome: Progressing  11/24/2024 2025 by Lisa Brown RN  Outcome: Progressing  Flowsheets (Taken 11/24/2024 2024)  Free From Fall Injury: Instruct family/caregiver on patient safety     Problem: ABCDS Injury Assessment  Goal: Absence of physical injury  11/25/2024 0926 by Shilpi Camarillo RN  Outcome: Progressing  11/24/2024 2025 by Lisa Brown RN  Outcome: Progressing  Flowsheets (Taken 11/24/2024 2024)  Absence of Physical Injury: Implement safety measures based on patient assessment     Problem: Physical Therapy - Adult  Goal: By Discharge: Performs mobility at highest level of function for planned discharge setting.  See evaluation for individualized goals.  Description: Physical Therapy Short Term Goals  Initiated 11/22/2024 and to be accomplished within 7 day(s) (11/29)  1.  Patient will supine to sit, sit to supine, roll right, and roll left  in bed with supervision.    2.  Patient will transfer from bed to chair and chair to bed with contact guard assist using the least restrictive device.  3.  Patient will perform sit to stand with contact guard assist.  4.  Patient will ambulate with minimal assistance for 50 feet with the least restrictive device.   5.  Patient will ascend/descend 3 stairs with bilateral handrail(s) with minimal assistance.    Physical Therapy Long Term Goals  Initiated 11/22/2024 and to be accomplished within 21 day(s) (12/13)  1.  Patient will supine to sit, sit to supine, roll right, and roll left in bed with modified independence.    2.  Patient will transfer from bed to chair and chair to bed with supervision/set-up using the least restrictive device.  3.  Patient will perform sit to stand with supervision/set-up.  4.  Patient will ambulate with contact guard assist for 150 feet with the least restrictive device.   5.  Patient will ascend/descend 12 stairs with bilateral handrail(s) with

## 2024-11-25 NOTE — PLAN OF CARE
Problem: Safety - Adult  Goal: Free from fall injury  Outcome: Progressing  Flowsheets (Taken 11/24/2024 2024)  Free From Fall Injury: Instruct family/caregiver on patient safety     Problem: ABCDS Injury Assessment  Goal: Absence of physical injury  Outcome: Progressing  Flowsheets (Taken 11/24/2024 2024)  Absence of Physical Injury: Implement safety measures based on patient assessment

## 2024-11-26 PROCEDURE — 97112 NEUROMUSCULAR REEDUCATION: CPT

## 2024-11-26 PROCEDURE — 97116 GAIT TRAINING THERAPY: CPT

## 2024-11-26 PROCEDURE — 97530 THERAPEUTIC ACTIVITIES: CPT

## 2024-11-26 PROCEDURE — 97535 SELF CARE MNGMENT TRAINING: CPT

## 2024-11-26 PROCEDURE — 6370000000 HC RX 637 (ALT 250 FOR IP): Performed by: INTERNAL MEDICINE

## 2024-11-26 PROCEDURE — 97542 WHEELCHAIR MNGMENT TRAINING: CPT

## 2024-11-26 PROCEDURE — 99232 SBSQ HOSP IP/OBS MODERATE 35: CPT | Performed by: HOSPITALIST

## 2024-11-26 PROCEDURE — 6360000002 HC RX W HCPCS: Performed by: HOSPITALIST

## 2024-11-26 PROCEDURE — 1180000000 HC REHAB R&B

## 2024-11-26 PROCEDURE — 97110 THERAPEUTIC EXERCISES: CPT

## 2024-11-26 RX ORDER — ENOXAPARIN SODIUM 100 MG/ML
40 INJECTION SUBCUTANEOUS DAILY
Status: DISCONTINUED | OUTPATIENT
Start: 2024-11-26 | End: 2024-12-06 | Stop reason: HOSPADM

## 2024-11-26 RX ADMIN — AMOXICILLIN AND CLAVULANATE POTASSIUM 1 TABLET: 875; 125 TABLET, FILM COATED ORAL at 16:21

## 2024-11-26 RX ADMIN — ASPIRIN 81 MG: 81 TABLET, COATED ORAL at 09:16

## 2024-11-26 RX ADMIN — OXYCODONE HYDROCHLORIDE AND ACETAMINOPHEN 2 TABLET: 5; 325 TABLET ORAL at 14:41

## 2024-11-26 RX ADMIN — ATORVASTATIN CALCIUM 80 MG: 40 TABLET, FILM COATED ORAL at 09:16

## 2024-11-26 RX ADMIN — ENOXAPARIN SODIUM 40 MG: 100 INJECTION SUBCUTANEOUS at 16:21

## 2024-11-26 RX ADMIN — AMOXICILLIN AND CLAVULANATE POTASSIUM 1 TABLET: 875; 125 TABLET, FILM COATED ORAL at 04:34

## 2024-11-26 ASSESSMENT — PAIN SCALES - WONG BAKER
WONGBAKER_NUMERICALRESPONSE: NO HURT

## 2024-11-26 ASSESSMENT — PAIN SCALES - GENERAL
PAINLEVEL_OUTOF10: 0
PAINLEVEL_OUTOF10: 6

## 2024-11-26 ASSESSMENT — PAIN - FUNCTIONAL ASSESSMENT: PAIN_FUNCTIONAL_ASSESSMENT: ACTIVITIES ARE NOT PREVENTED

## 2024-11-26 ASSESSMENT — PAIN DESCRIPTION - DESCRIPTORS: DESCRIPTORS: ACHING

## 2024-11-26 ASSESSMENT — PAIN DESCRIPTION - ORIENTATION: ORIENTATION: RIGHT

## 2024-11-26 ASSESSMENT — PAIN DESCRIPTION - ONSET: ONSET: GRADUAL

## 2024-11-26 ASSESSMENT — PAIN DESCRIPTION - LOCATION: LOCATION: SHOULDER

## 2024-11-26 ASSESSMENT — PAIN DESCRIPTION - PAIN TYPE: TYPE: ACUTE PAIN

## 2024-11-26 ASSESSMENT — PAIN DESCRIPTION - FREQUENCY: FREQUENCY: CONTINUOUS

## 2024-11-26 NOTE — PROGRESS NOTES
[facility] Physical Rehabilitation  Team Conference  Date: 11/26/2024  ACTIVE MONITORING OF CO-MORBID CONDITIONS/MANAGEMENT OF NEW MEDICAL ISSUES: Right sided weakness [R53.1]    **Please refer to patient's electronic medical record to view the care plan and goals**  NURSING Making gains Yes   Skin Care:      Skin Concerns: none       Pain: Pain Assessment  Pain Assessment: None - Denies Pain (11/26/24 0915)  Pain Level: 0 (11/26/24 0915)  Garza-Duvall Pain Rating: No hurt (11/26/24 0915)  Patient's Stated Pain Goal: 0 - No pain (11/25/24 1600)  Pain Location: Arm;Leg (11/24/24 2136)  Pain Orientation: Right (11/24/24 2136)  Pain Descriptors: Aching (11/24/24 2136)  Functional Pain Assessment: Activities are not prevented (11/24/24 2136)  Pain Type: Acute pain (11/24/24 2136)  Pain Radiating Towards: na (11/21/24 1838)  Pain Frequency: Continuous (11/24/24 2136)  Pain Onset: Gradual (11/24/24 2136)  Non-Pharmaceutical Pain Intervention(s): Other (Comment) (11/24/24 2136)  Response to Pain Intervention: Patient satisfied (11/24/24 2236)  Side Effects: No reported side effects (11/24/24 2236)  Multiple Pain Sites: No (11/21/24 1938)    Bladder/Bowel Urine Assessment  Urinary Status: Voiding  Urinary Incontinence: Absent  Urine Color: Yellow/straw  Urine Appearance: Clear  Urine Odor: No odor Stool Assessment  Incontinence: No  Stool Appearance: Formed  Stool Color: Brown  Stool Amount: Large  Stool Source: Rectum  Last BM (including prior to admit): 11/23/24   Goal: Molar abscess will be resolved      Barrier: abscess      Intervention: continue abx as ordered       Lab value concerns   Yes       Occupational Therapy  Making gains  Yes   Goal: Pt will preform lower body adls with CGA with AE       Barrier: decreased independence with adls, right UE hemiparesis      Intervention: NMRE, adl training       ADL Accessibility Limitations:none        Physical Therapy Making gains Yes   Goal: Pt will perform sit to  stand with CGA       Barrier: decreased strength       Intervention: transfer training, strengthening       Household Mobility Accessibility Limitations: 1 step to enter and 2 level home with 1 rail                                Week 1 Therapy Minutes Density Met: Yes  Total minutes week to date: Day 5 702 mins/ 900 mins      Therapy Minutes Not Met Action/Justification:   [] Pt on medical hold  [] Pt refusing therapy despite encouragement and education on benefits of therapy  [] Pt displays decreased tolerance to therapy  [] Other   Action Plan to Make Up Minutes: n/a    CMG Date: 12/3/2024  Estimated Discharge Date: 12/7/2024  [x]Rehabilitation goals from IPOC/Treatment plan reviewed  [x]No changes identified  []Goal(s) changed:     Patient needs identified [x]Caregiver Education   []Caregiver Conference         Recommended Discharge Plan []home alone   []home alone with assist prn    []Home with continuous supervision       [x]Home with continuous assistance   [] Assisted living                     []SNF   Home Health pt, ot and Outpatient pt, ot     CURRENT EQUIPMENT NEEDS:  [x]Handicap Placard Application    Equipment needed at discharge: Rolling walker    ADL Equipment:Tub bench and 3 in 1 BSC platform     Plan/Adjustments to Plan   [x]Medical conditions exist that require a minimum of 3 times/week physician      oversight and 24-hour rehabilitation nursing to manage/progress the plan of care   [x]Functional deficits require intensive and coordinated therapies to achieve   goals outlined in plan of care   [x]3 hours therapy 5 days/week OR 15 hours therapy over 7 days   [x]Continued plan of care as patient is showing progress and /or has an expectation to benefit    Team Focus:  n/a    Patient's plan of care has been reviewed and/or adjusted. Continue treatment as outlined.   I have led this Team Conference and agree with the plan, Bob Julian MD, 11/26/2024, 10:04 AM      Team Conference Recorder

## 2024-11-26 NOTE — PLAN OF CARE
Problem: Physical Therapy - Adult  Goal: By Discharge: Performs mobility at highest level of function for planned discharge setting.  See evaluation for individualized goals.  Description: Physical Therapy Short Term Goals  Initiated 11/22/2024 and to be accomplished within 7 day(s) (11/29)  1.  Patient will supine to sit, sit to supine, roll right, and roll left  in bed with supervision.    2.  Patient will transfer from bed to chair and chair to bed with contact guard assist using the least restrictive device.  3.  Patient will perform sit to stand with contact guard assist.  4.  Patient will ambulate with minimal assistance for 50 feet with the least restrictive device.   5.  Patient will ascend/descend 3 stairs with bilateral handrail(s) with minimal assistance.    Physical Therapy Long Term Goals  Initiated 11/22/2024 and to be accomplished within 21 day(s) (12/13)  1.  Patient will supine to sit, sit to supine, roll right, and roll left in bed with modified independence.    2.  Patient will transfer from bed to chair and chair to bed with supervision/set-up using the least restrictive device.  3.  Patient will perform sit to stand with supervision/set-up.  4.  Patient will ambulate with contact guard assist for 150 feet with the least restrictive device.   5.  Patient will ascend/descend 12 stairs with bilateral handrail(s) with contact guard assist.  11/26/2024 1558 by Myrna Fang, PT  Outcome: Progressing     PHYSICAL THERAPY TREATMENT    Patient: Shaila Martinez (44 y.o. female)  Date: 11/26/2024  Diagnosis: Right sided weakness [R53.1]   Precautions: fall risk  Chart, physical therapy assessment, plan of care and goals were reviewed.    Time in: 1115  Time out : 1215    Patient seen for: gait training, balance training, therex, and w/c mobility training    Pain:  Pt pain was reported as 0/10 pre-treatment.  Pt pain was reported as 0/10 post-treatment.  Intervention: none indicated    Patient  above impairments.  Continue treatment per established plan of care.  Discharge Recommendations:  Home with Home health PT;24 hour supervision or assist  Further Equipment Recommendations for Discharge:        Rolling walker             Estimated Discharge Date: 12/7/24    Activity Tolerance:   Fair+; pt limited by increased fatigue but no other complaints  Please refer to the flowsheet for vital signs taken during this treatment.    After treatment:   [] Patient left in no apparent distress in bed  [x] Patient left in no apparent distress sitting up in chair  [] Patient left in no apparent distress in w/c mobilizing under own power  [] Patient left in no apparent distress dining area  [] Patient left in no apparent distress mobilizing under own power  [x] Call bell left within reach  [] Nursing notified  [] Caregiver present  [] Bed alarm activated   [x] Chair alarm activated        REGINA MCCURDY, PT  11/26/2024

## 2024-11-26 NOTE — PROGRESS NOTES
[facility]  (A Division of [facility])      Consultation Note      Date of Admission: 11/21/2024    Date of Note: 11/26/2024      Reason for Referral: Dental abscess and right facial swelling  Referring Physician: Dr. Megan Hwang from this admission:   None    Current Antimicrobials:    Prior Antimicrobials:  Augmentin 11/21 to present Unasyn 11/18 to 11/21  Clindamycin 11/18 to 11/19       Assessment:         Right first molar dental abscess with associated soft tissue swelling and cellulitis to the right lower face:    -No drainable fluid collection noted on CT of the head and neck or CTA.   -OMFS contacted by the ED and advised against any surgical procedure at this time.    Right upper and lower extremity weakness: Admitted for stroke workup.  MRI of brain pending.    Plan:   Continue Augmentin 875 p.o. twice daily.   -Stop date 12/1  Needs to see a dentist or OMFS after completion of her antibiotics in 2 weeks.  Family is helping to arrange this.    Trend CBC, BMP 1-2 times per week      Gael Daniel DO  [facility]  [address], [address] [id number]  Office: [phone], Ext [phone]    Subjective:   Working with PT OT in gym. no particular complaints noted.  No fevers.  Feeling better    Tmax 98.5  WBC none today    HPI:  Ms. Martinez is a 44-year-old female with a history of seizure disorder who came to the emergency department with complaints of neurological changes as well as right jaw pain.  She says that her jaw pain initially had started on Saturday when she had some tenderness and pain in the lower right anterior jaw.  She had gone to the emergency department and gotten some Percocet as well as amoxicillin.  She had made an appointment to go to the dentist but they wanted her to complete her antibiotics before they would perform any procedures.  The pain and swelling had acutely worsened Sunday night

## 2024-11-26 NOTE — PROGRESS NOTES
4 Eyes Skin Assessment     NAME:  Shaila Martinez  YOB: 1980  MEDICAL RECORD NUMBER:  [id number]    The patient is being assessed for  Shift Handoff    I agree that at least one RN has performed a thorough Head to Toe Skin Assessment on the patient. ALL assessment sites listed below have been assessed.      Areas assessed by both nurses:    Sacrum. Buttock, Coccyx, Ischium        Does the Patient have a Wound? No noted wound(s)       Kwan Prevention initiated by RN: Yes  Wound Care Orders initiated by RN: No    Pressure Injury (Stage 3,4, Unstageable, DTI, NWPT, and Complex wounds) if present, place Wound referral order by RN under : No    New Ostomies, if present place, Ostomy referral order under : No     Nurse 1 eSignature: Electronically signed by LIAM HURT RN on 11/26/24 at 1:59 PM EST    **SHARE this note so that the co-signing nurse can place an eSignature**    Nurse 2 eSignature: Electronically signed by Lisa Brown RN on 11/27/24 at 5:30 AM EST

## 2024-11-26 NOTE — PROGRESS NOTES
[facility] PHYSICAL REHABILITATION  [address] [place] [id number]     INPATIENT REHABILITATION  DAILY PROGRESS NOTE     Date: 11/26/2024    Name: Shaila Martinez Age / Sex: 44 y.o. / female   CSN: [id number] MRN: [id number]   Admit Date: 11/21/2024 Length of Stay: 5 days     Primary Rehabilitation Diagnosis: 1.  Impaired mobility and ADLs secondary right-sided weakness.    Subjective:     Patient sitting in the wheelchair and was working with therapy during my visit.    Objective:     Vital Signs:  Patient Vitals for the past 24 hrs:   BP Temp Temp src Pulse Resp SpO2   11/26/24 0915 101/76 98.3 °F (36.8 °C) Oral 86 17 98 %   11/25/24 1930 110/77 98 °F (36.7 °C) Oral 85 18 99 %   11/25/24 1500 115/78 98.5 °F (36.9 °C) Oral 88 17 97 %        Physical Examination:  General:  Alert, cooperative, no acute distress    Pulmonary:  CTA Bilaterally. No Wheezing/Rales.  Cardiovascular: Regular rate and Rhythm.  GI:  Soft, Non distended, Non tender. + Bowel sounds.  Extremities:  No edema. No calf tenderness.   Psych: Good insight. Not anxious or agitated.  Neurologic: Alert and oriented X 3.  Right-sided weakness, upper extremity worse than lower extremity    Current Medications:  Current Facility-Administered Medications   Medication Dose Route Frequency    acetaminophen (TYLENOL) tablet 650 mg  650 mg Oral Q4H PRN    aspirin EC tablet 81 mg  81 mg Oral Daily    atorvastatin (LIPITOR) tablet 80 mg  80 mg Oral Daily    amoxicillin-clavulanate (AUGMENTIN) 875-125 MG per tablet 1 tablet  1 tablet Oral 2 times per day    oxyCODONE-acetaminophen (PERCOCET) 5-325 MG per tablet 2 tablet  2 tablet Oral Q6H PRN    polyethylene glycol (GLYCOLAX) packet 17 g  17 g Oral Daily PRN    ondansetron (ZOFRAN) tablet 4 mg  4 mg Oral Q8H PRN       Allergies:  No Known Allergies    Lab/Data Review:  No results found for this or any previous visit (from the past 24 hour(s)).      Assessment:     Primary Rehab Diagnosis   1.   Impaired mobility and ADLs secondary right-sided weakness.  2.  Right first molar abscess associated with soft tissue swelling and cellulitis of the face      MEDICAL PLAN:     For right-sided weakness, patient will benefit from intense PT, OT and speech therapy.  Patient is on aspirin and statin.   Continue aspirin and statin  continue Augmentin until December 1, 2024 per ID and Percocet as needed for pain management    11/26/2024  Will continue current treatment  Continue PT/OT/SLP eval and treatment    Discussed with patient about my above plan care.  Patient understood and agreed with my plan.    Functional Progress:    PHYSICAL THERAPY    ON ADMISSION MOST RECENT   Balance  Fair  Fair;+  Fair  Fair (with RW)  Fair;- (with RW)      Balance  Posture: Fair  Sitting - Static: Fair;+  Sitting - Dynamic: Fair  Standing - Static: Fair (with RW)  Standing - Dynamic: Fair;- (with RW)        Bed Mobility  Minimal assistance  Minimal assistance  Minimal assistance (min assist for RLE)  Moderate Assistance  Minimal assistance (Min assist for RLE)  Moderate assistance  Moderate Assistance Bed Mobility  Rolling to Right: Minimal assistance  Rolling to Left: Minimal assistance  Supine to Sit: Minimal assistance (min assist for RLE)  Sit to Stand: Contact guard assistance, Minimal Assistance  Sit to Supine: Minimal assistance (Min assist for RLE)  Bed to Chair: Minimal assistance (stand step with RW)  Car Transfer: Contact guard assistance   Wheelchair Mobility  Yes       Wheelchair Mobility  Propulsion: Yes         Ambulation  Rolling Walker  Minimal assistance  Level tile  30 feet Ambulation  Device: Rolling Walker (gait belt for safety)  Assistance: Contact guard assistance  Surface: Level tile  Distance: 150 ft   Stairs  Yes  Bilateral  Moderate assistance Stairs  Stairs?: Yes  Rails: Bilateral  Assistance: Contact guard assistance     Functional Progress:    OCCUPATIONAL THERAPY    ON ADMISSION MOST RECENT

## 2024-11-26 NOTE — PROGRESS NOTES
conducted an initial consultation and Spiritual Assessment for Shaila Martinez, who is a 44 y.o.,female. Patient’s Primary Language is: English.   According to the patient’s EMR Confucianism Affiliation is: Mosque.     The reason the Patient came to the hospital is:   Patient Active Problem List    Diagnosis Date Noted    Right sided weakness 11/21/2024    Dental infection 11/19/2024    Stroke-like symptom 11/18/2024    Pregnancy 07/13/2018    Back pain 05/18/2018    9 weeks gestation of pregnancy 12/27/2017        The  provided the following Interventions:  Initiated a relationship of care and support with patient in room 175 where she has been for the last 4 and a half days. Patient feels that she is getting better and is hopeful  Explored issues of angelito, belief, spirituality and Cheondoism/ritual needs while hospitalized. She is not active reji local [employer]oration[employer] community.  Listened empathically.  There is no advance directive on file  Provided chaplaincy education.  Provided information about Spiritual Care Services.  Offered prayer and assurance of continued prayers on patients behalf.   Chart reviewed.    The following outcomes were achieved:  Patient shared limited information about her medical narrative and spiritual journey/beliefs.  Patient processed feeling about current hospitalization.  Patient expressed gratitude for pastoral care visit.    Plan:  Chaplains will continue to follow and will provide pastoral care on an as needed/requested basis    Spiritual Health History and Assessment/Progress Note  [facility]    Initial Encounter ([id number]), Follow up,  ,      Name: Shaila Martinez MRN: [id number]    Age: 44 y.o.     Sex: female   Language: English   Yarsani: Mosque   Right sided weakness     Date: 11/26/2024            Total Time Calculated: 7 min              Spiritual Assessment began in [facility] IP REHAB [facility]IT        Referral/Consult From: Rounding   Encounter Overview/Reason:

## 2024-11-26 NOTE — PLAN OF CARE
Problem: Physical Therapy - Adult  Goal: By Discharge: Performs mobility at highest level of function for planned discharge setting.  See evaluation for individualized goals.  Description: Physical Therapy Short Term Goals  Initiated 2024 and to be accomplished within 7 day(s) ()  1.  Patient will supine to sit, sit to supine, roll right, and roll left  in bed with supervision.    2.  Patient will transfer from bed to chair and chair to bed with contact guard assist using the least restrictive device.  3.  Patient will perform sit to stand with contact guard assist.  4.  Patient will ambulate with minimal assistance for 50 feet with the least restrictive device.   5.  Patient will ascend/descend 3 stairs with bilateral handrail(s) with minimal assistance.    Physical Therapy Long Term Goals  Initiated 2024 and to be accomplished within 21 day(s) ()  1.  Patient will supine to sit, sit to supine, roll right, and roll left in bed with modified independence.    2.  Patient will transfer from bed to chair and chair to bed with supervision/set-up using the least restrictive device.  3.  Patient will perform sit to stand with supervision/set-up.  4.  Patient will ambulate with contact guard assist for 150 feet with the least restrictive device.   5.  Patient will ascend/descend 12 stairs with bilateral handrail(s) with contact guard assist.  Outcome: Progressing     PHYSICAL THERAPY TREATMENT    Patient: Shaila Martinez (44 y.o. female)  Date: 2024  Diagnosis: Right sided weakness [R53.1]   Precautions: fall risk  Chart, physical therapy assessment, plan of care and goals were reviewed.    Time in: 0730  Time out : 0900    Patient seen for: gait training, transfer training, therapeutic activities, strengthening, and patient education     Pain:  Pt pain was reported as 0 pre-treatment.  Pt pain was reported as 0 post-treatment.    Patient identified with name and : Yes    SUBJECTIVE:

## 2024-11-26 NOTE — PLAN OF CARE
Problem: Safety - Adult  Goal: Free from fall injury  11/25/2024 2215 by Theresa Werner RN  Outcome: Progressing  11/25/2024 0926 by Shilpi Camarillo RN  Outcome: Progressing     Problem: ABCDS Injury Assessment  Goal: Absence of physical injury  11/25/2024 2215 by Theresa Werner RN  Outcome: Progressing  11/25/2024 0926 by Shilpi Camarillo RN  Outcome: Progressing     Problem: Physical Therapy - Adult  Goal: By Discharge: Performs mobility at highest level of function for planned discharge setting.  See evaluation for individualized goals.  Description: Physical Therapy Short Term Goals  Initiated 11/22/2024 and to be accomplished within 7 day(s) (11/29)  1.  Patient will supine to sit, sit to supine, roll right, and roll left  in bed with supervision.    2.  Patient will transfer from bed to chair and chair to bed with contact guard assist using the least restrictive device.  3.  Patient will perform sit to stand with contact guard assist.  4.  Patient will ambulate with minimal assistance for 50 feet with the least restrictive device.   5.  Patient will ascend/descend 3 stairs with bilateral handrail(s) with minimal assistance.    Physical Therapy Long Term Goals  Initiated 11/22/2024 and to be accomplished within 21 day(s) (12/13)  1.  Patient will supine to sit, sit to supine, roll right, and roll left in bed with modified independence.    2.  Patient will transfer from bed to chair and chair to bed with supervision/set-up using the least restrictive device.  3.  Patient will perform sit to stand with supervision/set-up.  4.  Patient will ambulate with contact guard assist for 150 feet with the least restrictive device.   5.  Patient will ascend/descend 12 stairs with bilateral handrail(s) with contact guard assist.  11/25/2024 0909 by Robyn Pérez PTA  Outcome: Progressing

## 2024-11-26 NOTE — PLAN OF CARE
Problem: Occupational Therapy - Adult  Goal: By Discharge: Performs self-care activities at highest level of function for planned discharge setting.  See evaluation for individualized goals.  Description: Occupational Therapy Goals   Long Term Goals  Initiated (2024) and to be accomplished within 3 week(s)  1. Pt will perform self-feeding with Mod I.  2. Pt will perform grooming with supv.  3. Pt will perform UB bathing with supv.  4. Pt will perform LB bathing with CGA using AE as needed.  5. Pt will perform tub/shower transfer with CGA.   6. Pt will perform UB dressing with supv.  7. Pt will perform LB dressing with CGA using AE as needed.  8. Pt will perform toileting task with CGA.  9. Pt will perform toilet transfer with CGA using least restrictive assistive device.    Short Term Goals   Initiated (2024) and to be accomplished within 7 day(s); (to be reassessed by 2024)  1. Pt will perform self-feeding with set-up.   2. Pt will perform grooming with Min A.  3. Pt will perform UB bathing with Min A.  4. Pt will perform LB bathing with Mod A using AE as needed.  5. Pt will perform tub/shower transfer with Min A.  6. Pt will perform UB dressing with SBA/ Min A.  7. Pt will perform LB dressing with Mod A using AE as needed.  8. Pt will perform toileting task with Min A.  9. Pt will perform toilet transfer with Min A using least restrictive assistive device.    Outcome: Progressing   Occupational Therapy TREATMENT    Patient: Shaila Martinez   44 y.o.    Patient identified with name and : yes    Date: 2024    First Tx Session  Time In: 1402  Time Out: 1502  Diagnosis: Right sided weakness [R53.1]   Precautions: Restrictions/Precautions: Fall Risk, General Precautions (R UE hemiparesis)             Chart, occupational therapy assessment, plan of care, and goals were reviewed.     Pain:  Intensity Pre-treatment: 5/10 to 6/10 aching/ sore  Intensity Post-treatment: 6/10 aching/ sore  Scale:  ADL/IADL's and functional mobility for return to prior level of functioning.   Progression toward goals:  []          Improving appropriately and progressing toward goals  [x]          Improving slowly and progressing toward goals  []          Not making progress toward goals and plan of care will be adjusted      PLAN:  Patient continues to benefit from skilled intervention to address the above impairments.  Continue treatment per established plan of care.  Discharge Recommendations:   Home occupational therapy with 24 hr assist  Further Equipment Recommendations for Discharge:  extra wide platform commode; tub transfer bench; TBD pending progress   Estimated LOS: 12/7/2024     COMMUNICATION/EDUCATION:   [] Home safety education was provided and the patient/caregiver indicated understanding.  [x] Patient/family have participated as able in goal setting and plan of care.  [x] Patient/family agree to work toward stated goals and plan of care.  [] Patient understands intent and goals of therapy, but is neutral about his/her participation.  [] Patient is unable to participate in goal setting and plan of care.    Please refer to the flowsheet for vital signs taken during this treatment.  After treatment:   []  Patient left in no apparent distress sitting up in chair   [x]  Patient left in no apparent distress in bed with needs met  []  Patient handoff to SLP/PT  [x]  Call bell and immediate needs left within reach  [x]  Nursing notified  []  Caregiver present  [x]  Bed alarm activated  []  Chair alarm activated  []  [employer]inences offloaded  []  Heels activated for pressure relief  []  Telesitter/Care companion present    Entered Differentiated Treatment minutes: yes    Silvia Sanders OT  11/26/2024

## 2024-11-26 NOTE — PROGRESS NOTES
TEAM CONFERENCE FOLLOW-UP  Patient: Shaila Martinez (44 y.o. female)  Date: 11/26/2024  Diagnosis: Right sided weakness [R53.1] Right sided weakness      Precautions:      Met with patient to discuss the findings from 11/26/2024 Team Conference.    Patient requested further information and/or had questions:   No. Patient notified that her discharge date is set for 12/7 and she stated understanding.         Ayesha Mathis RN

## 2024-11-27 PROCEDURE — 1180000000 HC REHAB R&B

## 2024-11-27 PROCEDURE — 97112 NEUROMUSCULAR REEDUCATION: CPT

## 2024-11-27 PROCEDURE — 97116 GAIT TRAINING THERAPY: CPT

## 2024-11-27 PROCEDURE — 6370000000 HC RX 637 (ALT 250 FOR IP): Performed by: INTERNAL MEDICINE

## 2024-11-27 PROCEDURE — 97530 THERAPEUTIC ACTIVITIES: CPT

## 2024-11-27 PROCEDURE — 99232 SBSQ HOSP IP/OBS MODERATE 35: CPT | Performed by: EMERGENCY MEDICINE

## 2024-11-27 PROCEDURE — 97535 SELF CARE MNGMENT TRAINING: CPT

## 2024-11-27 PROCEDURE — 94761 N-INVAS EAR/PLS OXIMETRY MLT: CPT

## 2024-11-27 PROCEDURE — 97110 THERAPEUTIC EXERCISES: CPT

## 2024-11-27 PROCEDURE — 6360000002 HC RX W HCPCS: Performed by: HOSPITALIST

## 2024-11-27 RX ADMIN — ENOXAPARIN SODIUM 40 MG: 100 INJECTION SUBCUTANEOUS at 07:56

## 2024-11-27 RX ADMIN — ATORVASTATIN CALCIUM 80 MG: 40 TABLET, FILM COATED ORAL at 07:56

## 2024-11-27 RX ADMIN — AMOXICILLIN AND CLAVULANATE POTASSIUM 1 TABLET: 875; 125 TABLET, FILM COATED ORAL at 05:26

## 2024-11-27 RX ADMIN — AMOXICILLIN AND CLAVULANATE POTASSIUM 1 TABLET: 875; 125 TABLET, FILM COATED ORAL at 17:18

## 2024-11-27 RX ADMIN — ASPIRIN 81 MG: 81 TABLET, COATED ORAL at 07:56

## 2024-11-27 RX ADMIN — OXYCODONE HYDROCHLORIDE AND ACETAMINOPHEN 2 TABLET: 5; 325 TABLET ORAL at 22:08

## 2024-11-27 ASSESSMENT — PAIN SCALES - GENERAL
PAINLEVEL_OUTOF10: 2
PAINLEVEL_OUTOF10: 6
PAINLEVEL_OUTOF10: 0
PAINLEVEL_OUTOF10: 2

## 2024-11-27 ASSESSMENT — PAIN DESCRIPTION - PAIN TYPE
TYPE: ACUTE PAIN
TYPE: ACUTE PAIN

## 2024-11-27 ASSESSMENT — PAIN DESCRIPTION - DESCRIPTORS: DESCRIPTORS: SPASM

## 2024-11-27 ASSESSMENT — PAIN DESCRIPTION - ORIENTATION: ORIENTATION: RIGHT

## 2024-11-27 ASSESSMENT — PAIN DESCRIPTION - LOCATION: LOCATION: OTHER (COMMENT)

## 2024-11-27 NOTE — PROGRESS NOTES
4 Eyes Skin Assessment     NAME:  Shaila Martinez  YOB: 1980  MEDICAL RECORD NUMBER:  [id number]    The patient is being assessed for  Shift Handoff    I agree that at least one RN has performed a thorough Head to Toe Skin Assessment on the patient. ALL assessment sites listed below have been assessed.      Areas assessed by both nurses:    Head, Face, Ears, Shoulders, Back, Chest, Arms, Elbows, Hands, Sacrum. Buttock, Coccyx, Ischium, and Legs. Feet and Heels        Does the Patient have a Wound? No noted wound(s)       Kwan Prevention initiated by RN: Yes  Wound Care Orders initiated by RN: No    Pressure Injury (Stage 3,4, Unstageable, DTI, NWPT, and Complex wounds) if present, place Wound referral order by RN under : No    New Ostomies, if present place, Ostomy referral order under : No     Nurse 1 eSignature: Electronically signed by Lisa Brown RN on 11/27/24 at 5:31 AM EST    **SHARE this note so that the co-signing nurse can place an eSignature**    Nurse 2 eSignature: Electronically signed by Tressa Ennis RN on 11/27/24 at 7:41 AM EST

## 2024-11-27 NOTE — PLAN OF CARE
standing with asst of grab abr for balance..  Pt used both hands on grab bar to get up from shower not confident in putting both hands on chair to push up and use grab as an asst for balance.     SHOWER TRANSFER Initial Assessment Weekly Progress Assessment 11/27/2024   Transfer Technique Ambulating (stand step)  Ambulating (stand step)   Shower Transfer Functional Level Not tested Minimal assistance   Equipment Transfer tub bench Transfer tub bench   Shower Transfer Comments Not tested during initial OT evaluation due to R sided weakness, impaired balance/ coordination, and safety concerns.  Pt educated on proper tech of backing up to sit down with RW, turning, then moving further laterally on bench.      UPPER BODY DRESSING/UNDRESSING Initial Assessment Weekly Assessment 11/27/2024   Functional Level Moderate assistance, Minimal assistance     Clinical Factors Patient performed UB dressing Min/ Mod A seated w/c at sink. Pt doffed/donned gown seated in chair with setup.     LOWER BODY DRESSING/UNDRESSING Initial Assessment [id number]/27/2024   Functional Level Maximum assistance     Clinical Factors Patient performed LB dressing Mod/ Max A seated w/c level and in stance using sink counter for support and stability. Pt demonstrating requiring Max A to thread mesh underwear over distal LE's and Mod A to thread pant legs. Pt stood (Min A) using sink counter for support with Mod A to pull underwear and pants up over hips to waist. Pt requiring Max A for donning slipper socks. Pt will benefit from use of AE for improved safety and functional (I) with LB ADL's. Pt thread  BLE through panties seated in chair.  Pt educated on sitting to perform task 2/2 to BLE/BUE weakness.  Asst for balance during clothes mnagement.     TOILETING Initial Assessment Weekly Progress Assessment 11/27/2024   Functional Level Moderate assistance  Not addressed   Clinical factors Pt performed hygiene and clothing management (Mod A) using  simulated toileting task; use of extra wide platform commode positioned over toilet.       TOILET TRANSFER INDEPENDENCE Initial Assessment Weekly Progress Assessment 11/27/2024   Transfer Technique Stand step  Stand step   Level of Assistance Minimal assistance, Moderate assistance  Minimal assistance, Moderate assistance simulation using w/c transfer   Equipment Extra wide bedside commode (commode over toilet)     Toilet Transfer Comments Stand step xfer (Min/ Mod A) w/c <-> extra wide platform commode over toilet; gait belt. Use of grab bar.       Skin Integrity: appears intact    Activity Tolerance:  Patient limited by fatigue      NMRE to promote Muscle activation and increase functional use of RUE: Bicep curls at left 3x10 with 2lb weight in hand, active asst using left hand 3x10 without weights.          EDUCATION:   Education Given To: Patient  Education Provided: Mobility Training;Transfer Training;Fall Prevention Strategies;Safety  Education Method: Demonstration;Verbal;Teach Back  Barriers to Learning: None  Education Outcome: Verbalized understanding;Demonstrated understanding;Continued education needed    ASSESSMENT:  Pt seen for Skilled Occupational Therapy since  11/22/24 to address functional deficits in ADLs/IADLs.  Pt participated in therapeutic exercise, therapeutic activity, neuromuscular re-education, transfer training, education/instruction in using assistive devices/ compensatory strategies, and bathing/ dressing retraining. Pt currently shows Eugenio in most ADLs/IADLs meeting 9/9 STG's.    Progression toward goals:  [x]          Improving appropriately and progressing toward goals  []          Improving slowly and progressing toward goals  []          Not making progress toward goals and plan of care will be adjusted     PLAN:  Patient continues to benefit from skilled intervention to address the above impairments.  Continue treatment per established plan of care.  Discharge Recommendations:

## 2024-11-27 NOTE — PROGRESS NOTES
[facility] PHYSICAL REHABILITATION  [address] [place] [id number]     INPATIENT REHABILITATION  DAILY PROGRESS NOTE     Date: 11/27/2024    Name: Shaila Martinez Age / Sex: 44 y.o. / female   CSN: [id number] MRN: [id number]   Admit Date: 11/21/2024 Length of Stay: 6 days     Primary Rehabilitation Diagnosis: 1.  Impaired mobility and ADLs secondary right-sided weakness.    Subjective:     I personally saw and evaluated this patient face-to-face today.  Patient is sitting in bed in no apparent distress.  Son and daughter at bedside.  Patient is in good spirits.  Patient states that her right-sided weakness is slowly improving    Objective:     Vital Signs:  Patient Vitals for the past 24 hrs:   BP Temp Temp src Pulse Resp SpO2   11/27/24 0741 100/64 98.9 °F (37.2 °C) Oral 93 16 97 %   11/26/24 1930 110/69 97.8 °F (36.6 °C) Oral 82 16 96 %        Physical Examination:  General:  Awake, alert  Cardiovascular:  S1S2+, RRR  Pulmonary:  CTA b/l  GI:  Soft, BS+, NT, ND  Extremities:  No edema  Right-sided weakness    Current Medications:  Current Facility-Administered Medications   Medication Dose Route Frequency    enoxaparin (LOVENOX) injection 40 mg  40 mg SubCUTAneous Daily    acetaminophen (TYLENOL) tablet 650 mg  650 mg Oral Q4H PRN    aspirin EC tablet 81 mg  81 mg Oral Daily    atorvastatin (LIPITOR) tablet 80 mg  80 mg Oral Daily    amoxicillin-clavulanate (AUGMENTIN) 875-125 MG per tablet 1 tablet  1 tablet Oral 2 times per day    oxyCODONE-acetaminophen (PERCOCET) 5-325 MG per tablet 2 tablet  2 tablet Oral Q6H PRN    polyethylene glycol (GLYCOLAX) packet 17 g  17 g Oral Daily PRN    ondansetron (ZOFRAN) tablet 4 mg  4 mg Oral Q8H PRN       Allergies:  No Known Allergies    Lab/Data Review:  No results found for this or any previous visit (from the past 24 hour(s)).      Assessment:     Primary Rehab Diagnosis   1.  Impaired mobility and ADLs secondary right-sided weakness.  2.  Right first  Setup   Grooming  Minimal assistance, Moderate assistance Grooming  Grooming: Minimal assistance   Bathing  UB Bathing Moderate assistance  LB Bathing Maximum assistance Bathing  UB Bathing   UE Bathing: Supervision, Stand by assistance, Setup  LB Bathing   FLOW([id number]:last)@   Upper Body Dressing  Moderate assistance, Minimal assistance   Upper Body Dressing  UE Dressing: Stand by assistance, Minimal assistance, Verbal cueing   Lower Body Dressing  Maximum assistance Lower Body Dressing  LE Dressing: Minimal assistance, Verbal cueing   Toileting  Moderate assistance Toileting  Toileting: Moderate assistance, Minimal assistance   Toilet Transfers  Minimal assistance, Moderate assistance Toilet Transfers  Toilet Transfer: Contact guard assistance   Tub /Shower Transfers  Not tested Tub/Shower Transfers  Tub Transfers: Not tested     Legend:   7 - Independent   6 - Modified Independent   5 - Standby Assistance / Supervision / Set-up   4 - Minimum Assistance / Contact Guard Assistance   3 - Moderate Assistance   2 - Maximum Assistance   1 - Total Assistance / Dependent             Plan:     1. Justification for continued stay: Good progression towards established rehabilitation goals.    2. Medical Issues being followed closely:    [x]  Fall and safety precautions     []  Wound Care     [x]  Bowel and Bladder Function     [x]  Fluid Electrolyte and Nutrition Balance     [x]  Pain Control      3. Issues that 24 hour rehabilitation nursing is following:    [x]  Fall and safety precautions     []  Wound Care     [x]  Bowel and Bladder Function     [x]  Fluid Electrolyte and Nutrition Balance     []  Pain Control      [x]  Assistance with and education on in-room safety with transfers to and from the bed, wheelchair, toilet and shower.      4. Acute rehabilitation plan of care:    [x]  Continue current care and rehab.           [x]  Physical Therapy           [x]  Occupational Therapy           []  Speech Therapy

## 2024-11-27 NOTE — PLAN OF CARE
Problem: Physical Therapy - Adult  Goal: By Discharge: Performs mobility at highest level of function for planned discharge setting.  See evaluation for individualized goals.  Description: Physical Therapy Short Term Goals  Initiated 2024 and to be accomplished within 7 day(s) ()  1.  Patient will supine to sit, sit to supine, roll right, and roll left  in bed with supervision.    2.  Patient will transfer from bed to chair and chair to bed with contact guard assist using the least restrictive device.  3.  Patient will perform sit to stand with contact guard assist.  4.  Patient will ambulate with minimal assistance for 50 feet with the least restrictive device.   5.  Patient will ascend/descend 3 stairs with bilateral handrail(s) with minimal assistance.    Physical Therapy Long Term Goals  Initiated 2024 and to be accomplished within 21 day(s) ()  1.  Patient will supine to sit, sit to supine, roll right, and roll left in bed with modified independence.    2.  Patient will transfer from bed to chair and chair to bed with supervision/set-up using the least restrictive device.  3.  Patient will perform sit to stand with supervision/set-up.  4.  Patient will ambulate with contact guard assist for 150 feet with the least restrictive device.   5.  Patient will ascend/descend 12 stairs with bilateral handrail(s) with contact guard assist.  Outcome: Progressing     PHYSICAL THERAPY TREATMENT    Patient: Shaila Martinez (44 y.o. female)  Date: 2024  Diagnosis: Right sided weakness [R53.1]   Precautions: fall risk  Chart, physical therapy assessment, plan of care and goals were reviewed.    Time in: 0930  Time out : 1100    Patient seen for: gait training, transfer training, stair training, strengthening, NMRE, and patient education     Pain:  Pt pain was reported as 0 pre-treatment.  Pt pain was reported as 0 post-treatment.    Patient identified with name and : Yes    SUBJECTIVE:

## 2024-11-27 NOTE — PLAN OF CARE
Problem: Safety - Adult  Goal: Free from fall injury  Outcome: Progressing  Flowsheets (Taken 11/26/2024 2005)  Free From Fall Injury:   Instruct family/caregiver on patient safety   Based on caregiver fall risk screen, instruct family/caregiver to ask for assistance with transferring infant if caregiver noted to have fall risk factors     Problem: ABCDS Injury Assessment  Goal: Absence of physical injury  Outcome: Progressing  Flowsheets (Taken 11/26/2024 2005)  Absence of Physical Injury: Implement safety measures based on patient assessment

## 2024-11-27 NOTE — PLAN OF CARE
Problem: Physical Therapy - Adult  Goal: By Discharge: Performs mobility at highest level of function for planned discharge setting.  See evaluation for individualized goals.  Description: Physical Therapy Short Term Goals  Initiated 11/22/2024 and to be accomplished within 7 day(s) (11/29)  1.  Patient will supine to sit, sit to supine, roll right, and roll left  in bed with supervision.    2.  Patient will transfer from bed to chair and chair to bed with contact guard assist using the least restrictive device.  3.  Patient will perform sit to stand with contact guard assist.  4.  Patient will ambulate with minimal assistance for 50 feet with the least restrictive device.   5.  Patient will ascend/descend 3 stairs with bilateral handrail(s) with minimal assistance.    Physical Therapy Long Term Goals  Initiated 11/22/2024 and to be accomplished within 21 day(s) (12/13)  1.  Patient will supine to sit, sit to supine, roll right, and roll left in bed with modified independence.    2.  Patient will transfer from bed to chair and chair to bed with supervision/set-up using the least restrictive device.  3.  Patient will perform sit to stand with supervision/set-up.  4.  Patient will ambulate with contact guard assist for 150 feet with the least restrictive device.   5.  Patient will ascend/descend 12 stairs with bilateral handrail(s) with contact guard assist.  11/27/2024 0921 by Fred Patrick PTA  Outcome: Progressing       PHYSICAL THERAPY TREATMENT    Patient: Shaila Martinez (44 y.o. female)  Date: 11/27/2024  Diagnosis: Right sided weakness [R53.1]   Precautions: fall risk  Chart, physical therapy assessment, plan of care and goals were reviewed.    Time in: 0930  Time out : 1100    Patient seen for: gait training, therapeutic activity, bed mobility, transfer training, patient education.    Pain:  Pt pain was reported as 0 pre-treatment.  Pt pain was reported as 0 post-treatment.  Intervention: none

## 2024-11-28 PROCEDURE — 94761 N-INVAS EAR/PLS OXIMETRY MLT: CPT

## 2024-11-28 PROCEDURE — 6360000002 HC RX W HCPCS: Performed by: HOSPITALIST

## 2024-11-28 PROCEDURE — 6370000000 HC RX 637 (ALT 250 FOR IP): Performed by: INTERNAL MEDICINE

## 2024-11-28 PROCEDURE — 1180000000 HC REHAB R&B

## 2024-11-28 RX ADMIN — ENOXAPARIN SODIUM 40 MG: 100 INJECTION SUBCUTANEOUS at 08:18

## 2024-11-28 RX ADMIN — ASPIRIN 81 MG: 81 TABLET, COATED ORAL at 08:18

## 2024-11-28 RX ADMIN — ATORVASTATIN CALCIUM 80 MG: 40 TABLET, FILM COATED ORAL at 08:18

## 2024-11-28 RX ADMIN — AMOXICILLIN AND CLAVULANATE POTASSIUM 1 TABLET: 875; 125 TABLET, FILM COATED ORAL at 17:30

## 2024-11-28 RX ADMIN — AMOXICILLIN AND CLAVULANATE POTASSIUM 1 TABLET: 875; 125 TABLET, FILM COATED ORAL at 06:02

## 2024-11-28 ASSESSMENT — PAIN SCALES - GENERAL
PAINLEVEL_OUTOF10: 0
PAINLEVEL_OUTOF10: 2

## 2024-11-28 NOTE — PROGRESS NOTES
4 Eyes Skin Assessment     NAME:  Shaila Martinez  YOB: 1980  MEDICAL RECORD NUMBER:  [id number]    The patient is being assessed for  Shift Handoff    I agree that at least one RN has performed a thorough Head to Toe Skin Assessment on the patient. ALL assessment sites listed below have been assessed.      Areas assessed by both nurses:    Sacrum. Buttock, Coccyx, Ischium        Does the Patient have a Wound? No noted wound(s)       Kwan Prevention initiated by RN: Yes  Wound Care Orders initiated by RN: No    Pressure Injury (Stage 3,4, Unstageable, DTI, NWPT, and Complex wounds) if present, place Wound referral order by RN under : No    New Ostomies, if present place, Ostomy referral order under : No     Nurse 1 eSignature: Electronically signed by Tressa Ennis RN on 11/27/24 at 7:02 PM EST    **SHARE this note so that the co-signing nurse can place an eSignature**    Nurse 2 eSignature: {Esignature:[id number]}

## 2024-11-28 NOTE — PLAN OF CARE
Problem: Safety - Adult  Goal: Free from fall injury  11/28/2024 0430 by Key Pimentel RN  Outcome: Progressing  11/28/2024 0430 by Key Pimentel RN  Outcome: Progressing     Problem: ABCDS Injury Assessment  Goal: Absence of physical injury  11/28/2024 0430 by Key Pimentel RN  Outcome: Progressing  11/28/2024 0430 by Key Pimentel RN  Outcome: Progressing     Problem: Skin/Tissue Integrity  Goal: Absence of new skin breakdown  Description: 1.  Monitor for areas of redness and/or skin breakdown  2.  Assess vascular access sites hourly  3.  Every 4-6 hours minimum:  Change oxygen saturation probe site  4.  Every 4-6 hours:  If on nasal continuous positive airway pressure, respiratory therapy assess nares and determine need for appliance change or resting period.  Outcome: Progressing

## 2024-11-28 NOTE — PLAN OF CARE
Problem: Safety - Adult  Goal: Free from fall injury  11/28/2024 0431 by Key Pimentel RN  Outcome: Progressing  11/28/2024 0430 by Key Pimentel RN  Outcome: Progressing  11/28/2024 0430 by Key Pimentel RN  Outcome: Progressing     Problem: ABCDS Injury Assessment  Goal: Absence of physical injury  11/28/2024 0431 by Key Pimentel RN  Outcome: Progressing  11/28/2024 0430 by Key Pimentel RN  Outcome: Progressing  11/28/2024 0430 by Key Pimentel RN  Outcome: Progressing     Problem: Skin/Tissue Integrity  Goal: Absence of new skin breakdown  Description: 1.  Monitor for areas of redness and/or skin breakdown  2.  Assess vascular access sites hourly  3.  Every 4-6 hours minimum:  Change oxygen saturation probe site  4.  Every 4-6 hours:  If on nasal continuous positive airway pressure, respiratory therapy assess nares and determine need for appliance change or resting period.  11/28/2024 0431 by Key Pimentel RN  Outcome: Progressing  11/28/2024 0430 by Key Pimentel RN  Outcome: Progressing

## 2024-11-28 NOTE — PROGRESS NOTES
4 Eyes Skin Assessment     NAME:  Shaila Martinez  YOB: 1980  MEDICAL RECORD NUMBER:  [id number]    The patient is being assessed for  Shift Handoff    I agree that at least one RN has performed a thorough Head to Toe Skin Assessment on the patient. ALL assessment sites listed below have been assessed.      Areas assessed by both nurses:    Sacrum. Buttock, Coccyx, Ischium        Does the Patient have a Wound? No noted wound(s)       Kwan Prevention initiated by RN: Yes  Wound Care Orders initiated by RN: No    Pressure Injury (Stage 3,4, Unstageable, DTI, NWPT, and Complex wounds) if present, place Wound referral order by RN under : No    New Ostomies, if present place, Ostomy referral order under : No     Nurse 1 eSignature: Electronically signed by Tressa Ennis RN on 11/28/24 at 5:53 PM EST    **SHARE this note so that the co-signing nurse can place an eSignature**    Nurse 2 eSignature: {Esignature:[id number]}

## 2024-11-28 NOTE — PLAN OF CARE
Problem: Safety - Adult  Goal: Free from fall injury  11/28/2024 0903 by Tressa Ennis RN  Outcome: Progressing  Flowsheets (Taken 11/26/2024 2005 by Lisa Brown, RN)  Free From Fall Injury:   Instruct family/caregiver on patient safety   Based on caregiver fall risk screen, instruct family/caregiver to ask for assistance with transferring infant if caregiver noted to have fall risk factors  11/28/2024 0431 by Key Pimentel RN  Outcome: Progressing  11/28/2024 0430 by Key Pimentel RN  Outcome: Progressing  11/28/2024 0430 by Key Pimentel RN  Outcome: Progressing     Problem: ABCDS Injury Assessment  Goal: Absence of physical injury  11/28/2024 0903 by Tressa Ennis RN  Outcome: Progressing  Flowsheets (Taken 11/26/2024 2005 by Lisa Brown, RN)  Absence of Physical Injury: Implement safety measures based on patient assessment  11/28/2024 0431 by Key Pimentel RN  Outcome: Progressing  11/28/2024 0430 by Key Pimentel RN  Outcome: Progressing  11/28/2024 0430 by Key Pimentel RN  Outcome: Progressing     Problem: Skin/Tissue Integrity  Goal: Absence of new skin breakdown  Description: 1.  Monitor for areas of redness and/or skin breakdown  2.  Assess vascular access sites hourly  3.  Every 4-6 hours minimum:  Change oxygen saturation probe site  4.  Every 4-6 hours:  If on nasal continuous positive airway pressure, respiratory therapy assess nares and determine need for appliance change or resting period.  11/28/2024 0903 by Tressa Ennis RN  Outcome: Progressing  11/28/2024 0431 by Key Pimentel RN  Outcome: Progressing  11/28/2024 0430 by Key Pimentel RN  Outcome: Progressing

## 2024-11-29 LAB
ANION GAP SERPL CALC-SCNC: 7 MMOL/L (ref 3–18)
BASOPHILS # BLD: 0.1 K/UL (ref 0–0.1)
BASOPHILS NFR BLD: 1 % (ref 0–2)
BUN SERPL-MCNC: 8 MG/DL (ref 7–18)
BUN/CREAT SERPL: 8 (ref 12–20)
CALCIUM SERPL-MCNC: 9.4 MG/DL (ref 8.5–10.1)
CHLORIDE SERPL-SCNC: 109 MMOL/L (ref 100–111)
CO2 SERPL-SCNC: 22 MMOL/L (ref 21–32)
CREAT SERPL-MCNC: 1.01 MG/DL (ref 0.6–1.3)
DIFFERENTIAL METHOD BLD: ABNORMAL
EOSINOPHIL # BLD: 0.4 K/UL (ref 0–0.4)
EOSINOPHIL NFR BLD: 5 % (ref 0–5)
ERYTHROCYTE [DISTWIDTH] IN BLOOD BY AUTOMATED COUNT: 12.5 % (ref 11.6–14.5)
GLUCOSE SERPL-MCNC: 108 MG/DL (ref 74–99)
HCT VFR BLD AUTO: 40.9 % (ref 35–45)
HGB BLD-MCNC: 13.6 G/DL (ref 12–16)
IMM GRANULOCYTES # BLD AUTO: 0 K/UL (ref 0–0.04)
IMM GRANULOCYTES NFR BLD AUTO: 0 % (ref 0–0.5)
LYMPHOCYTES # BLD: 3.6 K/UL (ref 0.9–3.6)
LYMPHOCYTES NFR BLD: 43 % (ref 21–52)
MCH RBC QN AUTO: 28.6 PG (ref 24–34)
MCHC RBC AUTO-ENTMCNC: 33.3 G/DL (ref 31–37)
MCV RBC AUTO: 86.1 FL (ref 78–100)
MONOCYTES # BLD: 0.3 K/UL (ref 0.05–1.2)
MONOCYTES NFR BLD: 3 % (ref 3–10)
NEUTS SEG # BLD: 3.9 K/UL (ref 1.8–8)
NEUTS SEG NFR BLD: 48 % (ref 40–73)
NRBC # BLD: 0 K/UL (ref 0–0.01)
NRBC BLD-RTO: 0 PER 100 WBC
PLATELET # BLD AUTO: 435 K/UL (ref 135–420)
PLATELET COMMENT: ABNORMAL
PMV BLD AUTO: 9.9 FL (ref 9.2–11.8)
POTASSIUM SERPL-SCNC: 4 MMOL/L (ref 3.5–5.5)
RBC # BLD AUTO: 4.75 M/UL (ref 4.2–5.3)
RBC MORPH BLD: ABNORMAL
SODIUM SERPL-SCNC: 138 MMOL/L (ref 136–145)
WBC # BLD AUTO: 8.3 K/UL (ref 4.6–13.2)

## 2024-11-29 PROCEDURE — 6370000000 HC RX 637 (ALT 250 FOR IP): Performed by: INTERNAL MEDICINE

## 2024-11-29 PROCEDURE — 97110 THERAPEUTIC EXERCISES: CPT

## 2024-11-29 PROCEDURE — 97530 THERAPEUTIC ACTIVITIES: CPT

## 2024-11-29 PROCEDURE — 99232 SBSQ HOSP IP/OBS MODERATE 35: CPT | Performed by: EMERGENCY MEDICINE

## 2024-11-29 PROCEDURE — 1180000000 HC REHAB R&B

## 2024-11-29 PROCEDURE — 97116 GAIT TRAINING THERAPY: CPT

## 2024-11-29 PROCEDURE — 97112 NEUROMUSCULAR REEDUCATION: CPT

## 2024-11-29 PROCEDURE — 94761 N-INVAS EAR/PLS OXIMETRY MLT: CPT

## 2024-11-29 PROCEDURE — 97535 SELF CARE MNGMENT TRAINING: CPT

## 2024-11-29 PROCEDURE — 36415 COLL VENOUS BLD VENIPUNCTURE: CPT

## 2024-11-29 PROCEDURE — 80048 BASIC METABOLIC PNL TOTAL CA: CPT

## 2024-11-29 PROCEDURE — 85025 COMPLETE CBC W/AUTO DIFF WBC: CPT

## 2024-11-29 PROCEDURE — 6360000002 HC RX W HCPCS: Performed by: HOSPITALIST

## 2024-11-29 RX ADMIN — AMOXICILLIN AND CLAVULANATE POTASSIUM 1 TABLET: 875; 125 TABLET, FILM COATED ORAL at 16:21

## 2024-11-29 RX ADMIN — AMOXICILLIN AND CLAVULANATE POTASSIUM 1 TABLET: 875; 125 TABLET, FILM COATED ORAL at 05:08

## 2024-11-29 RX ADMIN — ATORVASTATIN CALCIUM 80 MG: 40 TABLET, FILM COATED ORAL at 08:19

## 2024-11-29 RX ADMIN — ASPIRIN 81 MG: 81 TABLET, COATED ORAL at 08:19

## 2024-11-29 RX ADMIN — ENOXAPARIN SODIUM 40 MG: 100 INJECTION SUBCUTANEOUS at 08:19

## 2024-11-29 ASSESSMENT — PAIN SCALES - GENERAL
PAINLEVEL_OUTOF10: 0

## 2024-11-29 ASSESSMENT — PAIN SCALES - WONG BAKER
WONGBAKER_NUMERICALRESPONSE: NO HURT

## 2024-11-29 NOTE — PROGRESS NOTES
[facility]  (A Division of [facility])      Consultation Note      Date of Admission: 11/21/2024    Date of Note: 11/29/2024      Reason for Referral: Dental abscess and right facial swelling  Referring Physician: Dr. Megan Hwang from this admission:   None    Current Antimicrobials:    Prior Antimicrobials:  Augmentin 11/21 to present Unasyn 11/18 to 11/21  Clindamycin 11/18 to 11/19       Assessment:         Right first molar dental abscess with associated soft tissue swelling and cellulitis to the right lower face:    -No drainable fluid collection noted on CT of the head and neck or CTA.   -OMFS contacted by the ED and advised against any surgical procedure at this time.    Right upper and lower extremity weakness: Admitted for stroke workup.  MRI of brain pending.    Plan:   Continue Augmentin 875 p.o. twice daily.   -Stop date 12/1  Needs to see a dentist or OMFS after completion of her antibiotics in 2 weeks.  Family is helping to arrange this.    Trend CBC, BMP 1-2 times per week      Gael Daniel DO  [facility]  [address], [address] [address]  Office: [phone], Ext [phone]    Subjective:   No particular complaints noted.  No fevers.  Feeling better    Tmax 98.6  WBC 8.3    HPI:  Ms. Martinez is a 44-year-old female with a history of seizure disorder who came to the emergency department with complaints of neurological changes as well as right jaw pain.  She says that her jaw pain initially had started on Saturday when she had some tenderness and pain in the lower right anterior jaw.  She had gone to the emergency department and gotten some Percocet as well as amoxicillin.  She had made an appointment to go to the dentist but they wanted her to complete her antibiotics before they would perform any procedures.  The pain and swelling had acutely worsened Sunday night into Monday morning.  She then  non-distended, active bowel sounds. Non-tender   Genitourinary:  deferred   Extremities:   no clubbing, cyanosis; no joint effusions or swelling; muscle mass appropriate for age   Neurologic:  No gross focal sensory abnormalities; equal muscle strength to upper and lower extremities. Speech appropirate. Cranial nerves intact   Psychiatric:   appropriate and interactive.       Labs: Results:   Chemistry Recent Labs     11/29/24  0502      K 4.0      CO2 22   BUN 8        CBC w/Diff Recent Labs     11/29/24  0502   WBC 8.3   RBC 4.75   HGB 13.6   HCT 40.9   *              No results found for: \"SDES\" No components found for: \"CULT\"     Results       ** No results found for the last 336 hours. **                  Imaging:     All available imaging since presentation reviewed as per EPIC

## 2024-11-29 NOTE — PLAN OF CARE
Problem: Occupational Therapy - Adult  Goal: By Discharge: Performs self-care activities at highest level of function for planned discharge setting.  See evaluation for individualized goals.  Description: Occupational Therapy Goals   Long Term Goals  Initiated (2024) and to be accomplished within 3 week(s)  1. Pt will perform self-feeding with Mod I.  2. Pt will perform grooming with supv.  3. Pt will perform UB bathing with supv.  4. Pt will perform LB bathing with CGA using AE as needed.  5. Pt will perform tub/shower transfer with CGA.   6. Pt will perform UB dressing with supv.  7. Pt will perform LB dressing with CGA using AE as needed.  8. Pt will perform toileting task with CGA.  9. Pt will perform toilet transfer with CGA using least restrictive assistive device.    Short Term Goals   Initiated (2024) and to be accomplished within 7 day(s); (to be reassessed by 2024)  1. Pt will perform self-feeding with set-up.  24  2. Pt will perform grooming with Min A.  24  3. Pt will perform UB bathing with Min A.   24  4. Pt will perform LB bathing with Mod A using AE as needed.  24  5. Pt will perform tub/shower transfer with Min A.  24  6. Pt will perform UB dressing with SBA/ Min A.  24  7. Pt will perform LB dressing with Mod A using AE as needed.  24  8. Pt will perform toileting task with Min A.  24  9. Pt will perform toilet transfer with Min A using least restrictive assistive device.  24    Outcome: Progressing   Occupational Therapy TREATMENT    Patient: Shaila Martinez   44 y.o.    Patient identified with name and : yes    Date: 2024    First Tx Session  Time In: 1330  Time Out: 1500      Diagnosis: Right sided weakness [R53.1]   Precautions:  Restrictions/Precautions: Fall Risk, General Precautions (R UE hemiparesis)                             Chart, occupational therapy assessment, plan of care, and goals

## 2024-11-29 NOTE — PLAN OF CARE
Deviations   Slow Ariella;Increased BASSAM;Decreased head and trunk rotation       WALKING INDEPENDENCE Initial Assessment Weekly Progress Assessment 11/29/2024   Assistive device Rolling Walker Rolling Walker   Ambulation assistance - surface Minimal assistance  Level tile Stand by assistance  Level tile     Distance 30 feet 150 feet   Comments Min assist for patient to keep upright trunk, wheelchair follow for safety. Pt req rest after 30 feet.  Focusing on decreased UE weight bearing on RW with ambulation    Ambulation-uneven surface 150 feet  Contact guard assistance 150 feet  Contact guard assistance       STEPS/STAIRS Initial Assessment Weekly Progress Assessment 11/29/2024   Steps/Stairs ambulated 2  4\" 12   (7\" in stairwell)   Rail Use Bilateral   Bilateral     Assistance Level Moderate assistance Contact guard assistance   Comments Verbal cues for sequencing, mod assist for steadying when descending step due to tendency to lean backwards.     Curbs/Ramps 6\" 6\"  Minimal assistance with curb negotiation and stabilizing RW for safety       Neuro Re-Education:  R LE taps on 6\" step with L UE support only 2x10 for improved foot clearance and control with R LE. CGA at all times for safety with increased difficulty reported.     Therapeutic Exercise:  Sci Fit L2 x15 min BUE/LE for increased strength, R LE ROM, and endurance to allow patient to progress with ambulation distance.    ASSESSMENT:  Patient tolerated today's session well and is making good progress towards goals.  Patient has achieved all STG's and will new be progressing towards LTG's.  Patient is very motivated and works on exercises when she is not in therapy.  Patient ambulated on uneven surfaces 150' requiring only CGA for safety.  Patient still reports increased fatigue with activities and requires frequent rest breaks to recover.  Patient could benefit from continued skilled PT to address decreased strength R LE and improve balance to allow patient  to return home safely.  Progression toward goals:  [x]      Improving appropriately and progressing toward goals  []      Improving slowly and progressing toward goals  []      Not making progress toward goals and plan of care will be adjusted     PLAN:  Patient continues to benefit from skilled intervention to address the above impairments.  Continue treatment per established plan of care.  Discharge Recommendations:  Home with Home health PT;24 hour supervision or assist  Further Equipment Recommendations for Discharge:        Rolling            Estimated Discharge Date:12/7/24    Activity Tolerance:   Fair+  Please refer to the flowsheet for vital signs taken during this treatment.  After treatment:   [] Patient left in no apparent distress in bed  [x] Patient left in no apparent distress sitting up in chair  [] Patient left in no apparent distress in w/c mobilizing under own power  [] Patient left in no apparent distress dining area  [] Patient left in no apparent distress mobilizing under own power  [x] Call bell left within reach  [] Nursing notified  [x] Caregiver present (daughter)  [] Bed alarm activated   [x] Chair alarm activated        Robyn Pérez, PTA  11/29/2024

## 2024-11-29 NOTE — PLAN OF CARE
Problem: Safety - Adult  Goal: Free from fall injury  11/29/2024 0917 by Edilberto Rothman RN  Outcome: Progressing  11/28/2024 2356 by Lisa Brown RN  Outcome: Progressing  Flowsheets (Taken 11/28/2024 2355)  Free From Fall Injury: Instruct family/caregiver on patient safety     Problem: ABCDS Injury Assessment  Goal: Absence of physical injury  11/29/2024 0917 by Edilberto Rothman RN  Outcome: Progressing  11/28/2024 2356 by Lisa Brown RN  Outcome: Progressing  Flowsheets (Taken 11/28/2024 2355)  Absence of Physical Injury: Implement safety measures based on patient assessment     Problem: Skin/Tissue Integrity  Goal: Absence of new skin breakdown  Description: 1.  Monitor for areas of redness and/or skin breakdown  2.  Assess vascular access sites hourly  3.  Every 4-6 hours minimum:  Change oxygen saturation probe site  4.  Every 4-6 hours:  If on nasal continuous positive airway pressure, respiratory therapy assess nares and determine need for appliance change or resting period.  11/28/2024 2356 by Lisa Brown, RN  Outcome: Progressing

## 2024-11-29 NOTE — PROGRESS NOTES
4 Eyes Skin Assessment     NAME:  Shaila Martinez  YOB: 1980  MEDICAL RECORD NUMBER:  [id number]    The patient is being assessed for  Shift Handoff    I agree that at least one RN has performed a thorough Head to Toe Skin Assessment on the patient. ALL assessment sites listed below have been assessed.      Areas assessed by both nurses:    Arms, Elbows, Hands, Sacrum. Buttock, Coccyx, Ischium, and Legs. Feet and Heels        Does the Patient have a Wound? No noted wound(s)       Kwan Prevention initiated by RN: Yes  Wound Care Orders initiated by RN: No    Pressure Injury (Stage 3,4, Unstageable, DTI, NWPT, and Complex wounds) if present, place Wound referral order by RN under : No    New Ostomies, if present place, Ostomy referral order under : No     Nurse 1 eSignature: {Esignature:[id number]}    **SHARE this note so that the co-signing nurse can place an eSignature**    Nurse 2 eSignature: {Esignature:[id number]}

## 2024-11-29 NOTE — PLAN OF CARE
Problem: Safety - Adult  Goal: Free from fall injury  Outcome: Progressing  Flowsheets (Taken 11/28/2024 2356)  Free From Fall Injury: Instruct family/caregiver on patient safety     Problem: ABCDS Injury Assessment  Goal: Absence of physical injury  Outcome: Progressing  Flowsheets (Taken 11/28/2024 2357)  Absence of Physical Injury: Implement safety measures based on patient assessment     Problem: Skin/Tissue Integrity  Goal: Absence of new skin breakdown  Description: 1.  Monitor for areas of redness and/or skin breakdown  2.  Assess vascular access sites hourly  3.  Every 4-6 hours minimum:  Change oxygen saturation probe site  4.  Every 4-6 hours:  If on nasal continuous positive airway pressure, respiratory therapy assess nares and determine need for appliance change or resting period.  Outcome: Progressing

## 2024-11-30 PROCEDURE — 97116 GAIT TRAINING THERAPY: CPT

## 2024-11-30 PROCEDURE — 97112 NEUROMUSCULAR REEDUCATION: CPT

## 2024-11-30 PROCEDURE — 6370000000 HC RX 637 (ALT 250 FOR IP): Performed by: INTERNAL MEDICINE

## 2024-11-30 PROCEDURE — 97530 THERAPEUTIC ACTIVITIES: CPT

## 2024-11-30 PROCEDURE — 94761 N-INVAS EAR/PLS OXIMETRY MLT: CPT

## 2024-11-30 PROCEDURE — 97110 THERAPEUTIC EXERCISES: CPT

## 2024-11-30 PROCEDURE — 6360000002 HC RX W HCPCS: Performed by: HOSPITALIST

## 2024-11-30 PROCEDURE — 1180000000 HC REHAB R&B

## 2024-11-30 RX ADMIN — ENOXAPARIN SODIUM 40 MG: 100 INJECTION SUBCUTANEOUS at 07:44

## 2024-11-30 RX ADMIN — ATORVASTATIN CALCIUM 80 MG: 40 TABLET, FILM COATED ORAL at 07:44

## 2024-11-30 RX ADMIN — AMOXICILLIN AND CLAVULANATE POTASSIUM 1 TABLET: 875; 125 TABLET, FILM COATED ORAL at 05:15

## 2024-11-30 RX ADMIN — ASPIRIN 81 MG: 81 TABLET, COATED ORAL at 07:44

## 2024-11-30 RX ADMIN — AMOXICILLIN AND CLAVULANATE POTASSIUM 1 TABLET: 875; 125 TABLET, FILM COATED ORAL at 17:27

## 2024-11-30 ASSESSMENT — PAIN SCALES - GENERAL
PAINLEVEL_OUTOF10: 0

## 2024-11-30 ASSESSMENT — PAIN SCALES - WONG BAKER: WONGBAKER_NUMERICALRESPONSE: NO HURT

## 2024-11-30 NOTE — PROGRESS NOTES
4 Eyes Skin Assessment     NAME:  Shaila Martinez  YOB: 1980  MEDICAL RECORD NUMBER:  [id number]    The patient is being assessed for  Shift Handoff    I agree that at least one RN has performed a thorough Head to Toe Skin Assessment on the patient. ALL assessment sites listed below have been assessed.      Areas assessed by both nurses:    Head, Face, Ears, Shoulders, Back, Chest, Arms, Elbows, Hands, Sacrum. Buttock, Coccyx, Ischium, and Legs. Feet and Heels        Does the Patient have a Wound? No noted wound(s)       Kwan Prevention initiated by RN: Yes  Wound Care Orders initiated by RN: No    Pressure Injury (Stage 3,4, Unstageable, DTI, NWPT, and Complex wounds) if present, place Wound referral order by RN under : No    New Ostomies, if present place, Ostomy referral order under : No     Nurse 1 eSignature: Electronically signed by Lisa Brown RN on 11/30/24 at 6:32 AM EST    **SHARE this note so that the co-signing nurse can place an eSignature**    Nurse 2 eSignature: Electronically signed by Genet Fernando RN on 11/30/24 at 4:01 PM EST

## 2024-11-30 NOTE — PLAN OF CARE
Problem: Physical Therapy - Adult  Goal: By Discharge: Performs mobility at highest level of function for planned discharge setting.  See evaluation for individualized goals.  Description: Physical Therapy Short Term Goals  Initiated 11/22/2024 and to be accomplished within 7 day(s) (11/29)  1.  Patient will supine to sit, sit to supine, roll right, and roll left  in bed with supervision.  (MET 11/29/24)  2.  Patient will transfer from bed to chair and chair to bed with contact guard assist using the least restrictive device. (MET 11/29/24)  3.  Patient will perform sit to stand with contact guard assist. (MET 11/29/24)  4.  Patient will ambulate with minimal assistance for 50 feet with the least restrictive device.  (MET 11/29/24)  5.  Patient will ascend/descend 3 stairs with bilateral handrail(s) with minimal assistance. (MET 11/29/24)    Physical Therapy Long Term Goals  Initiated 11/22/2024 and to be accomplished within 21 day(s) (12/13)  1.  Patient will supine to sit, sit to supine, roll right, and roll left in bed with modified independence.    2.  Patient will transfer from bed to chair and chair to bed with supervision/set-up using the least restrictive device.  3.  Patient will perform sit to stand with supervision/set-up.  4.  Patient will ambulate with contact guard assist for 150 feet with the least restrictive device.   Updated goal:Patient will ambulate with stand by assist/supervision for 150 feet with the least restrictive device.   5.  Patient will ascend/descend 12 stairs with bilateral handrail(s) with contact guard assist.  Outcome: Progressing       PHYSICAL THERAPY TREATMENT    Patient: Shaila Martinez (44 y.o. female)  Date: 11/30/2024  Diagnosis: Right sided weakness [R53.1]   Precautions: fall risk  Chart, physical therapy assessment, plan of care and goals were reviewed.    Time in: 0800   Time out: 0900  Time in: 1131   Time out : 1201    Patient seen for: gait training, transfer

## 2024-11-30 NOTE — PROGRESS NOTES
4 Eyes Skin Assessment     NAME:  Shaila Martinez  YOB: 1980  MEDICAL RECORD NUMBER:  [id number]    The patient is being assessed for  Shift Handoff    I agree that at least one RN has performed a thorough Head to Toe Skin Assessment on the patient. ALL assessment sites listed below have been assessed.      Areas assessed by both nurses:    Head, Face, Ears, Shoulders, Back, Chest, Arms, Elbows, Hands, Sacrum. Buttock, Coccyx, Ischium, Legs. Feet and Heels, and Under Medical Devices         Does the Patient have a Wound? No noted wound(s)       Kwan Prevention initiated by RN: No  Wound Care Orders initiated by RN: No    Pressure Injury (Stage 3,4, Unstageable, DTI, NWPT, and Complex wounds) if present, place Wound referral order by RN under : No    New Ostomies, if present place, Ostomy referral order under : No     Nurse 1 eSignature: Electronically signed by Genet Fernando RN on 11/30/24 at 4:07 PM EST    **SHARE this note so that the co-signing nurse can place an eSignature**    Nurse 2 eSignature: Electronically signed by Rainer Paulino RN on 11/30/24 at 7:53 PM EST

## 2024-11-30 NOTE — PLAN OF CARE
Problem: Safety - Adult  Goal: Free from fall injury  11/29/2024 2024 by Lisa Brown RN  Outcome: Progressing  11/29/2024 0917 by Edilberto Rothman RN  Outcome: Progressing     Problem: ABCDS Injury Assessment  Goal: Absence of physical injury  11/29/2024 2024 by Lisa Brown RN  Outcome: Progressing  11/29/2024 0917 by Edilberto Rothman RN  Outcome: Progressing     Problem: Skin/Tissue Integrity  Goal: Absence of new skin breakdown  Description: 1.  Monitor for areas of redness and/or skin breakdown  2.  Assess vascular access sites hourly  3.  Every 4-6 hours minimum:  Change oxygen saturation probe site  4.  Every 4-6 hours:  If on nasal continuous positive airway pressure, respiratory therapy assess nares and determine need for appliance change or resting period.  Outcome: Progressing

## 2024-11-30 NOTE — PROGRESS NOTES
109 100 - 111 mmol/L    CO2 22 21 - 32 mmol/L    Anion Gap 7 3.0 - 18 mmol/L    Glucose 108 (H) 74 - 99 mg/dL    BUN 8 7.0 - 18 MG/DL    Creatinine 1.01 0.6 - 1.3 MG/DL    BUN/Creatinine Ratio 8 (L) 12 - 20      Est, Glom Filt Rate 71 >60 ml/min/1.73m2    Calcium 9.4 8.5 - 10.1 MG/DL   CBC with Auto Differential    Collection Time: 11/29/24  5:02 AM   Result Value Ref Range    WBC 8.3 4.6 - 13.2 K/uL    RBC 4.75 4.20 - 5.30 M/uL    Hemoglobin 13.6 12.0 - 16.0 g/dL    Hematocrit 40.9 35.0 - 45.0 %    MCV 86.1 78.0 - 100.0 FL    MCH 28.6 24.0 - 34.0 PG    MCHC 33.3 31.0 - 37.0 g/dL    RDW 12.5 11.6 - 14.5 %    Platelets 435 (H) 135 - 420 K/uL    MPV 9.9 9.2 - 11.8 FL    Nucleated RBCs 0.0 0  WBC    nRBC 0.00 0.00 - 0.01 K/uL    Neutrophils % 48 40 - 73 %    Lymphocytes % 43 21 - 52 %    Monocytes % 3 3 - 10 %    Eosinophils % 5 0 - 5 %    Basophils % 1 0 - 2 %    Immature Granulocytes % 0 0.0 - 0.5 %    Neutrophils Absolute 3.9 1.8 - 8.0 K/UL    Lymphocytes Absolute 3.6 0.9 - 3.6 K/UL    Monocytes Absolute 0.3 0.05 - 1.2 K/UL    Eosinophils Absolute 0.4 0.0 - 0.4 K/UL    Basophils Absolute 0.1 0.0 - 0.1 K/UL    Immature Granulocytes Absolute 0.0 0.00 - 0.04 K/UL    Differential Type MANUAL      Platelet Comment Increased Platelets      RBC Comment NORMOCYTIC, NORMOCHROMIC           Assessment:     Primary Rehab Diagnosis   1.  Impaired mobility and ADLs secondary right-sided weakness.  2.  Right first molar abscess associated with soft tissue swelling and cellulitis of the face      MEDICAL PLAN:     For right-sided weakness, patient will benefit from intense PT, OT and speech therapy.  Patient is on aspirin and statin.   Continue aspirin and statin  continue Augmentin until December 1, 2024 per ID and Percocet as needed for pain management    11/26/2024  Will continue current treatment  Continue PT/OT/SLP eval and treatment    11/27-I discussed the care plan with the patient.  Continue aspirin and statin.   Complete course of antibiotics.  Continue therapy.    11/29-continue therapy.  Complete course of antibiotics.  ID is following.  I discussed care plan with patient and family at bedside    Functional Progress:    PHYSICAL THERAPY    ON ADMISSION MOST RECENT   Balance  Fair  Good  Fair;+  Fair (with RW)  Fair;- (with RW)      Balance  Posture: Fair  Sitting - Static: Good  Sitting - Dynamic: Fair;+  Standing - Static: Fair (with RW)  Standing - Dynamic: Fair;- (with RW)        Bed Mobility  Minimal assistance  Minimal assistance  Minimal assistance (min assist for RLE)  Moderate Assistance  Minimal assistance (Min assist for RLE)  Moderate assistance  Moderate Assistance Bed Mobility  Rolling to Right: Supervision  Rolling to Left: Supervision (patient relies heavily on momentum to achieve roll)  Supine to Sit: Supervision  Sit to Stand: Contact guard assistance, Stand by assistance  Sit to Supine: Supervision  Bed to Chair: Contact guard assistance, Stand by assistance  Car Transfer: Contact guard assistance   Wheelchair Mobility  Yes       Wheelchair Mobility  Propulsion: Yes         Ambulation  Rolling Walker  Minimal assistance  Level tile  30 feet Ambulation  Device: Rolling Walker  Assistance: Stand by assistance  Surface: Level tile  Distance: 150 feet   Stairs  Yes  Bilateral  Moderate assistance Stairs  Stairs?: Yes  Rails: Bilateral  Assistance: Contact guard assistance     Functional Progress:    OCCUPATIONAL THERAPY    ON ADMISSION MOST RECENT   Eating  Supervision, Setup Eating  Feeding: Supervision, Setup   Grooming  Minimal assistance, Moderate assistance Grooming  Grooming: Supervision   Bathing  UB Bathing Moderate assistance  LB Bathing Maximum assistance Bathing  UB Bathing   UE Bathing: Supervision, Stand by assistance, Setup  LB Bathing   FLOW([id number]:last)@   Upper Body Dressing  Moderate assistance, Minimal assistance   Upper Body Dressing  UE Dressing: Stand by assistance, Minimal

## 2024-12-01 PROCEDURE — 6360000002 HC RX W HCPCS: Performed by: HOSPITALIST

## 2024-12-01 PROCEDURE — 1180000000 HC REHAB R&B

## 2024-12-01 PROCEDURE — 6370000000 HC RX 637 (ALT 250 FOR IP): Performed by: INTERNAL MEDICINE

## 2024-12-01 RX ADMIN — ENOXAPARIN SODIUM 40 MG: 100 INJECTION SUBCUTANEOUS at 08:36

## 2024-12-01 RX ADMIN — ATORVASTATIN CALCIUM 80 MG: 40 TABLET, FILM COATED ORAL at 08:35

## 2024-12-01 RX ADMIN — ASPIRIN 81 MG: 81 TABLET, COATED ORAL at 08:35

## 2024-12-01 RX ADMIN — AMOXICILLIN AND CLAVULANATE POTASSIUM 1 TABLET: 875; 125 TABLET, FILM COATED ORAL at 05:38

## 2024-12-01 RX ADMIN — AMOXICILLIN AND CLAVULANATE POTASSIUM 1 TABLET: 875; 125 TABLET, FILM COATED ORAL at 17:19

## 2024-12-01 ASSESSMENT — PAIN SCALES - GENERAL
PAINLEVEL_OUTOF10: 0

## 2024-12-01 ASSESSMENT — PAIN SCALES - WONG BAKER: WONGBAKER_NUMERICALRESPONSE: NO HURT

## 2024-12-01 NOTE — PLAN OF CARE
Problem: Safety - Adult  Goal: Free from fall injury  Outcome: Progressing  Patient encouraged to call for assistance as needed. Call bell within reach. Bed in lowest locked position.  Bed alarm active and audible. Patient verbalizes understanding and demonstrates use of call bell.    Problem: Skin/Tissue Integrity  Goal: Absence of new skin breakdown  Description: 1.  Monitor for areas of redness and/or skin breakdown  2.  Assess vascular access sites hourly  3.  Every 4-6 hours minimum:  Change oxygen saturation probe site  4.  Every 4-6 hours:  If on nasal continuous positive airway pressure, respiratory therapy assess nares and determine need for appliance change or resting period.  Outcome: Progressing   Patient encouraged to turn every 2 hours to prevent skin breakdown. Patient verbalizes understanding.

## 2024-12-01 NOTE — PROGRESS NOTES
Assumed care of patient from LIZZY Spear (off going nurse). Patient alert and oriented. No apparent distress noted. Patient offers no concerns and can verbalize needs. Assessment to follow. Call bell within reach. Bed in lowest, locked position.

## 2024-12-01 NOTE — PROGRESS NOTES
4 Eyes Skin Assessment     NAME:  Shaila Martinez  YOB: 1980  MEDICAL RECORD NUMBER:  [id number]    The patient is being assessed for  Shift Handoff    I agree that at least one RN has performed a thorough Head to Toe Skin Assessment on the patient. ALL assessment sites listed below have been assessed.      Areas assessed by both nurses:    Head, Face, Ears, Shoulders, Back, Chest, Arms, Elbows, Hands, Sacrum. Buttock, Coccyx, Ischium, Legs. Feet and Heels, and Under Medical Devices         Does the Patient have a Wound? No noted wound(s)       Kwan Prevention initiated by RN: No  Wound Care Orders initiated by RN: No    Pressure Injury (Stage 3,4, Unstageable, DTI, NWPT, and Complex wounds) if present, place Wound referral order by RN under : No    New Ostomies, if present place, Ostomy referral order under : No     Nurse 1 eSignature: Electronically signed by Camila Mercer RN on 12/1/24 at 6:20 PM EST    **SHARE this note so that the co-signing nurse can place an eSignature**    Nurse 2 eSignature: Electronically signed by Lisa Brown RN on 12/2/24 at 6:10 AM EST

## 2024-12-01 NOTE — PROGRESS NOTES
"Physical Therapy Treatment    Patient Name:  Elroy Rahman   MRN:  [id number]    Recommendations:     Discharge Recommendations:  rehabilitation facility   Discharge Equipment Recommendations: (TBD)   Barriers to discharge: None    Assessment:     Elroy Rahman is a 65 y.o. male admitted with a medical diagnosis of Sepsis due to methicillin susceptible Staphylococcus aureus.  He presents with the following impairments/functional limitations:  weakness, impaired self care skills, impaired endurance, impaired functional mobilty, gait instability, impaired skin, decreased lower extremity function.  Tolerated session c c/o fatigue.  Performed mobility c S-SBA.  Pt able to amb short distance c crutches on this date using step-to pattern.  Pt would benefit from continued skilled acute PT 3x/wk to improve functional mobility.      Rehab Prognosis: Good; patient would benefit from acute skilled PT services to address these deficits and reach maximum level of function.    Recent Surgery: Procedure(s) (LRB):  ECHOCARDIOGRAM, TRANSESOPHAGEAL (N/A) Day of Surgery    Plan:     During this hospitalization, patient to be seen 3 x/week to address the identified rehab impairments via therapeutic activities, therapeutic exercises, neuromuscular re-education, gait training and progress toward the following goals:    · Plan of Care Expires:  10/15/19    Subjective     Chief Complaint: fatigue  Patient/Family Comments/goals: "I don't think I'll need too much rehab."  Pain/Comfort:  · Pain Rating 1: 0/10      Objective:     Communicated with RN prior to session.  Patient found HOB elevated with peripheral IV, telemetry upon PT entry to room.     General Precautions: Standard, fall   Orthopedic Precautions:N/A   Braces: N/A     Functional Mobility:  · Bed Mobility:     · Rolling Right: supervision  · Scooting: supervision  · Supine to Sit: supervision  · Transfers:     · Sit to Stand:  stand by assistance with axillary " 4 Eyes Skin Assessment     NAME:  Shaila Martinez  YOB: 1980  MEDICAL RECORD NUMBER:  [id number]    The patient is being assessed for  Shift Handoff    I agree that at least one RN has performed a thorough Head to Toe Skin Assessment on the patient. ALL assessment sites listed below have been assessed.      Areas assessed by both nurses:    Sacrum. Buttock, Coccyx, Ischium        Does the Patient have a Wound? No noted wound(s)       Kwan Prevention initiated by RN: Yes  Wound Care Orders initiated by RN: No    Pressure Injury (Stage 3,4, Unstageable, DTI, NWPT, and Complex wounds) if present, place Wound referral order by RN under : No    New Ostomies, if present place, Ostomy referral order under : No     Nurse 1 eSignature: Electronically signed by Rainer Paulino RN on 11/30/24 at 7:54 PM EST    **SHARE this note so that the co-signing nurse can place an eSignature**    Nurse 2 eSignature: Electronically signed by Camila Mercer RN on 12/1/24 at 8:06 AM EST     crutches  · Gait: 5ft c crutches SBA  · Mild unsteadiness, c/o fatigue, step-to pattern  · Balance: sitting (S); standing (SBA)      AM-PAC 6 CLICK MOBILITY  Turning over in bed (including adjusting bedclothes, sheets and blankets)?: 4  Sitting down on and standing up from a chair with arms (e.g., wheelchair, bedside commode, etc.): 3  Moving from lying on back to sitting on the side of the bed?: 4  Moving to and from a bed to a chair (including a wheelchair)?: 3  Need to walk in hospital room?: 3  Climbing 3-5 steps with a railing?: 2  Basic Mobility Total Score: 19       Therapeutic Activities and Exercises:  Pt educated on: PT role/POC; safety c mobility; benefits of OOB activities; performing therex; d/c recs - v/u  -sat EOB [id number]  -sit<>stand 5x      Patient left sitting EOB with all lines intact, call button in reach, RN notified and spouse present..    GOALS:   Multidisciplinary Problems     Physical Therapy Goals        Problem: Physical Therapy Goal    Goal Priority Disciplines Outcome Goal Variances Interventions   Physical Therapy Goal     PT, PT/OT Ongoing (interventions implemented as appropriate)     Description:  Goals to be met by: 2019     Patient will increase functional independence with mobility by performin. Supine to sit with Stand-by Assistance   2. Sit to supine with Stand-by Assistance  3. Sit to stand transfer with Contact Guard Assistance  4. Bed to chair transfer with Contact Guard Assistance  5. Gait  x 10 feet with Contact Guard Assistance using crutches  6. Wheelchair propulsion x100 feet with Supervision using bilateral upper extremities  7. Ascend/descend 2 stair with Handrail Contact Guard Assistance  8. Lower extremity exercise program x15 reps per handout, with supervision                       Time Tracking:     PT Received On: 19  PT Start Time: 1022     PT Stop Time: 1055  PT Total Time (min): 33 min     Billable Minutes: Gait Training 10 min and Therapeutic  Activity 14 min    Treatment Type: Treatment  PT/PTA: PT     PTA Visit Number: 0     Salvador Vincent, PT  09/18/2019

## 2024-12-02 PROCEDURE — 97530 THERAPEUTIC ACTIVITIES: CPT

## 2024-12-02 PROCEDURE — 97110 THERAPEUTIC EXERCISES: CPT

## 2024-12-02 PROCEDURE — 97150 GROUP THERAPEUTIC PROCEDURES: CPT

## 2024-12-02 PROCEDURE — 1180000000 HC REHAB R&B

## 2024-12-02 PROCEDURE — 94761 N-INVAS EAR/PLS OXIMETRY MLT: CPT

## 2024-12-02 PROCEDURE — 99232 SBSQ HOSP IP/OBS MODERATE 35: CPT | Performed by: EMERGENCY MEDICINE

## 2024-12-02 PROCEDURE — 97112 NEUROMUSCULAR REEDUCATION: CPT

## 2024-12-02 PROCEDURE — 6370000000 HC RX 637 (ALT 250 FOR IP): Performed by: INTERNAL MEDICINE

## 2024-12-02 PROCEDURE — 97116 GAIT TRAINING THERAPY: CPT

## 2024-12-02 PROCEDURE — 6360000002 HC RX W HCPCS: Performed by: HOSPITALIST

## 2024-12-02 PROCEDURE — 6370000000 HC RX 637 (ALT 250 FOR IP): Performed by: EMERGENCY MEDICINE

## 2024-12-02 RX ORDER — BISACODYL 5 MG/1
10 TABLET, DELAYED RELEASE ORAL ONCE
Status: COMPLETED | OUTPATIENT
Start: 2024-12-02 | End: 2024-12-02

## 2024-12-02 RX ADMIN — AMOXICILLIN AND CLAVULANATE POTASSIUM 1 TABLET: 875; 125 TABLET, FILM COATED ORAL at 16:34

## 2024-12-02 RX ADMIN — ASPIRIN 81 MG: 81 TABLET, COATED ORAL at 09:07

## 2024-12-02 RX ADMIN — BISACODYL 10 MG: 5 TABLET, COATED ORAL at 17:59

## 2024-12-02 RX ADMIN — OXYCODONE HYDROCHLORIDE AND ACETAMINOPHEN 2 TABLET: 5; 325 TABLET ORAL at 13:43

## 2024-12-02 RX ADMIN — ATORVASTATIN CALCIUM 80 MG: 40 TABLET, FILM COATED ORAL at 09:03

## 2024-12-02 RX ADMIN — ENOXAPARIN SODIUM 40 MG: 100 INJECTION SUBCUTANEOUS at 09:04

## 2024-12-02 RX ADMIN — AMOXICILLIN AND CLAVULANATE POTASSIUM 1 TABLET: 875; 125 TABLET, FILM COATED ORAL at 05:14

## 2024-12-02 ASSESSMENT — PAIN DESCRIPTION - ORIENTATION: ORIENTATION: RIGHT

## 2024-12-02 ASSESSMENT — PAIN DESCRIPTION - PAIN TYPE: TYPE: ACUTE PAIN

## 2024-12-02 ASSESSMENT — PAIN SCALES - GENERAL
PAINLEVEL_OUTOF10: 0
PAINLEVEL_OUTOF10: 7
PAINLEVEL_OUTOF10: 0

## 2024-12-02 ASSESSMENT — PAIN SCALES - WONG BAKER
WONGBAKER_NUMERICALRESPONSE: NO HURT

## 2024-12-02 ASSESSMENT — PAIN DESCRIPTION - LOCATION: LOCATION: SHOULDER

## 2024-12-02 ASSESSMENT — PAIN DESCRIPTION - DESCRIPTORS: DESCRIPTORS: ACHING

## 2024-12-02 ASSESSMENT — PAIN DESCRIPTION - ONSET: ONSET: GRADUAL

## 2024-12-02 ASSESSMENT — PAIN DESCRIPTION - FREQUENCY: FREQUENCY: CONTINUOUS

## 2024-12-02 ASSESSMENT — PAIN - FUNCTIONAL ASSESSMENT: PAIN_FUNCTIONAL_ASSESSMENT: ACTIVITIES ARE NOT PREVENTED

## 2024-12-02 NOTE — PLAN OF CARE
Problem: Physical Therapy - Adult  Goal: By Discharge: Performs mobility at highest level of function for planned discharge setting.  See evaluation for individualized goals.  Description: Physical Therapy Short Term Goals  Initiated 11/22/2024 and to be accomplished within 7 day(s) (11/29)  1.  Patient will supine to sit, sit to supine, roll right, and roll left  in bed with supervision.  (MET 11/29/24)  2.  Patient will transfer from bed to chair and chair to bed with contact guard assist using the least restrictive device. (MET 11/29/24)  3.  Patient will perform sit to stand with contact guard assist. (MET 11/29/24)  4.  Patient will ambulate with minimal assistance for 50 feet with the least restrictive device.  (MET 11/29/24)  5.  Patient will ascend/descend 3 stairs with bilateral handrail(s) with minimal assistance. (MET 11/29/24)    Physical Therapy Long Term Goals  Initiated 11/22/2024 and to be accomplished within 21 day(s) (12/13)  1.  Patient will supine to sit, sit to supine, roll right, and roll left in bed with modified independence.    2.  Patient will transfer from bed to chair and chair to bed with supervision/set-up using the least restrictive device.  3.  Patient will perform sit to stand with supervision/set-up.  4.  Patient will ambulate with contact guard assist for 150 feet with the least restrictive device.   Updated goal:Patient will ambulate with stand by assist/supervision for 150 feet with the least restrictive device.   5.  Patient will ascend/descend 12 stairs with bilateral handrail(s) with contact guard assist.  Outcome: Progressing     PHYSICAL THERAPY TREATMENT    Patient: Shaila Martinez (44 y.o. female)  Date: 12/2/2024  Diagnosis: Right sided weakness [R53.1]   Precautions: fall risk  Chart, physical therapy assessment, plan of care and goals were reviewed.    Time in: 0730  Time out : 0900    Patient seen for: gait training, transfer training, bed mobility, strengthening,

## 2024-12-02 NOTE — PLAN OF CARE
Problem: Physical Therapy - Adult  Goal: By Discharge: Performs mobility at highest level of function for planned discharge setting.  See evaluation for individualized goals.  Description: Physical Therapy Short Term Goals  Initiated 11/22/2024 and to be accomplished within 7 day(s) (11/29)  1.  Patient will supine to sit, sit to supine, roll right, and roll left  in bed with supervision.  (MET 11/29/24)  2.  Patient will transfer from bed to chair and chair to bed with contact guard assist using the least restrictive device. (MET 11/29/24)  3.  Patient will perform sit to stand with contact guard assist. (MET 11/29/24)  4.  Patient will ambulate with minimal assistance for 50 feet with the least restrictive device.  (MET 11/29/24)  5.  Patient will ascend/descend 3 stairs with bilateral handrail(s) with minimal assistance. (MET 11/29/24)    Physical Therapy Long Term Goals  Initiated 11/22/2024 and to be accomplished within 21 day(s) (12/13)  1.  Patient will supine to sit, sit to supine, roll right, and roll left in bed with modified independence.    2.  Patient will transfer from bed to chair and chair to bed with supervision/set-up using the least restrictive device.  3.  Patient will perform sit to stand with supervision/set-up.  4.  Patient will ambulate with contact guard assist for 150 feet with the least restrictive device.   Updated goal:Patient will ambulate with stand by assist/supervision for 150 feet with the least restrictive device.   5.  Patient will ascend/descend 12 stairs with bilateral handrail(s) with contact guard assist.  12/2/2024 1406 by Myrna Fang, PT  Outcome: Progressing     PHYSICAL THERAPY TREATMENT    Patient: Shaila Martinez (44 y.o. female)  Date: 12/2/2024  Diagnosis: Right sided weakness [R53.1]   Precautions: falls, R side weakness  Chart, physical therapy assessment, plan of care and goals were reviewed.    Time in: 1305  Time out : 1340    Patient seen for: transfers  training, strengthening therex    Pain:  Pt pain was reported as 1-2/10, R hip/thigh, pre-treatment.  Pt pain was reported as 7/10, R hip/thigh, post-treatment.  Intervention: rest breaks, repositioning, received pain meds from nursing at end of session    Patient identified with name and : Yes    SUBJECTIVE:      Pt stated \"I'm not sure how much I can do this afternoon, I'm pretty tired already\" Pt agreeable to participate in PT to tolerance  Pt noted increased pain in R hip/thigh area after attempting sit/stands without using UE's to push up and also during hip flexion near end range during heel-slide exercises.    OBJECTIVE DATA SUMMARY:    Objective:   Education: Education Given To: Patient  Education Provided: Transfer Training;Fall Prevention Strategies;Safety  Education Method: Demonstration;Verbal;Teach Back  Barriers to Learning: None  Education Outcome: Verbalized understanding;Continued education needed  BED/MAT MOBILITY Daily Assessment    Rolling Right  NT    Rolling Left  NT    Supine to Sit CGA (from supine to sitting on L side of flat mat table without bed rail)    Sit to Supine  SBA on flat mat table without bed rail; pt self -assisted R LE onto mat table with B UE's.      TRANSFERS Daily Assessment    Sit to Stand Contact guard assistance    Transfer Assist Score Bed to chair transfer: Stand by assistance (stand step with RW)  Stand pivot transfer: Stand by assistance;Contact guard assistance          Comments pt more fatigued this session and needed more steadying assist during transfers, especially at end when R hip/thigh was hurting more. Pt continues to rely on UE support on RW alot during transfers.  Pt attempted sit/stands x 3 reps from edge of 23\"height mat table without using Ue's to push up in order to facilitate more use of LE's/quads.  Pt struggled and needed min to mod A to achieve standing and then sit down slowly and safely. Pt noted increased pain in R LE with this activity, so PT

## 2024-12-02 NOTE — PLAN OF CARE
Problem: Safety - Adult  Goal: Free from fall injury  12/1/2024 2004 by Lisa Brown RN  Outcome: Progressing  Flowsheets (Taken 12/1/2024 2003)  Free From Fall Injury: Based on caregiver fall risk screen, instruct family/caregiver to ask for assistance with transferring infant if caregiver noted to have fall risk factors  12/1/2024 0954 by Camila Mercer RN  Outcome: Progressing     Problem: ABCDS Injury Assessment  Goal: Absence of physical injury  12/1/2024 2004 by Lisa Brown RN  Outcome: Progressing  Flowsheets (Taken 12/1/2024 2003)  Absence of Physical Injury: Implement safety measures based on patient assessment  12/1/2024 0954 by Camila Mercer RN  Outcome: Progressing     Problem: Skin/Tissue Integrity  Goal: Absence of new skin breakdown  Description: 1.  Monitor for areas of redness and/or skin breakdown  2.  Assess vascular access sites hourly  3.  Every 4-6 hours minimum:  Change oxygen saturation probe site  4.  Every 4-6 hours:  If on nasal continuous positive airway pressure, respiratory therapy assess nares and determine need for appliance change or resting period.  12/1/2024 2004 by Lisa Brown, RN  Outcome: Progressing  12/1/2024 0954 by Camila Mercer, RN  Outcome: Progressing

## 2024-12-02 NOTE — PROGRESS NOTES
[facility] PHYSICAL REHABILITATION  [address] [place] [id number]     INPATIENT REHABILITATION  DAILY PROGRESS NOTE     Date: 12/2/2024    Name: Shaila Martinez Age / Sex: 44 y.o. / female   CSN: [id number] MRN: [id number]   Admit Date: 11/21/2024 Length of Stay: 11 days     Primary Rehabilitation Diagnosis: 1.  Impaired mobility and ADLs secondary right-sided weakness.    Subjective:     I personally saw and evaluated this patient face-to-face today.  Patient is sitting in bed in no apparent distress, awake and alert.  Patient complains of some constipation    Objective:     Vital Signs:  Patient Vitals for the past 24 hrs:   BP Temp Temp src Pulse Resp SpO2   12/02/24 1542 109/74 97.9 °F (36.6 °C) Oral 72 20 95 %   12/02/24 0745 100/68 98.7 °F (37.1 °C) Oral 83 20 98 %   12/01/24 2052 108/70 98.3 °F (36.8 °C) Oral 83 18 97 %        Physical Examination:  General:  Awake, alert  Cardiovascular:  S1S2+, RRR  Pulmonary:  CTA b/l  GI:  Soft, BS+, NT, ND  Extremities:  No edema  Right sided weakness    Current Medications:  Current Facility-Administered Medications   Medication Dose Route Frequency    enoxaparin (LOVENOX) injection 40 mg  40 mg SubCUTAneous Daily    acetaminophen (TYLENOL) tablet 650 mg  650 mg Oral Q4H PRN    aspirin EC tablet 81 mg  81 mg Oral Daily    atorvastatin (LIPITOR) tablet 80 mg  80 mg Oral Daily    amoxicillin-clavulanate (AUGMENTIN) 875-125 MG per tablet 1 tablet  1 tablet Oral 2 times per day    oxyCODONE-acetaminophen (PERCOCET) 5-325 MG per tablet 2 tablet  2 tablet Oral Q6H PRN    polyethylene glycol (GLYCOLAX) packet 17 g  17 g Oral Daily PRN    ondansetron (ZOFRAN) tablet 4 mg  4 mg Oral Q8H PRN       Allergies:  No Known Allergies    Lab/Data Review:  No results found for this or any previous visit (from the past 24 hour(s)).        Assessment:     Primary Rehab Diagnosis   1.  Impaired mobility and ADLs secondary right-sided weakness.  2.  Right first molar  bed, wheelchair, toilet and shower.      4. Acute rehabilitation plan of care:    [x]  Continue current care and rehab.           [x]  Physical Therapy           [x]  Occupational Therapy           []  Speech Therapy     []  Hold Rehab until further notice     5. Medications:    [x]  MAR Reviewed     [x]  Continue Present Medications       6. Chemical DVT Prophylaxis:      [x]  Enoxaparin     []  Unfractionated Heparin     []  Warfarin     []  NOAC     []  Aspirin     []  None     7. Mechanical DVT Prophylaxis:      []  SHILPI Stockings     [x]  Sequential Compression Device     []  None     8. GI Prophylaxis:      []  PPI     []  H2 Blocker     [x]  None / Not indicated     9. Code status:Full    Dragon medical dictation software was used for portions of this report. Unintended errors may occur.      Signed:    Guzman Rain MD      December 2, 2024

## 2024-12-02 NOTE — PLAN OF CARE
Chart, occupational therapy assessment, plan of care, and goals were reviewed.     Pain:  Intensity Pre-treatment: 3/10   Intensity Post-treatment: 3/10  Scale: Numeric Rating Scale  Location: Right Shoulder  Quality: Aching (intermittent aching)  Intervention(s): Nurse notified, Repositioning , and Rest; tasks modified based on patient's tolerance    SUBJECTIVE:   Patient stated “my family came in on Thanksgiving\" and \"it really improved my mood\".    OBJECTIVE DATA SUMMARY:     COGNITION/PERCEPTION Daily Assessment   Overall orientation status Within Normal Limits   Orientation level Oriented X4   Overall cognitive status WFL    Alertness Appears intact   Following Commands Follows multistep commands with increased time    Attention Span Appears intact    Memory Appears intact    Problem Solving Assistance required to generate solutions;Assistance required to implement solutions    Initiation Does not require cues   Sequencing Requires cues for some     THERAPEUTIC ACTIVITY Daily Assessment    Sitting Balance: Modified independent   Standing Balance: Contact guard assistance     Pt participated in FM/dexterity there act using right hand to facilitate increased functional use. Pt used x5 cones in 5 different colors and matching small pegs for activity. Pt used thumb/digit opposition pinches to  small peg and place in cone of matching color. Pt required several rest breaks to complete due to increased fatigue in right hand.     Pt participated in BUE there act for putting together a 24 piece puzzle; increased task challenge using 1.5# wrist weights. Performed for bilateral integration, functional reach, and problem solving skills (trial & error) for carryover to self-cares. Pt participated in RUE there act using tabletop shoulder arch for reaching across midline to bring plastic rings up/ over arch to opposite side; 19 plastic rings x 2 trials. Therapist providing initial instruction with demonstration.  Patient is unable to participate in goal setting and plan of care.    Please refer to the flowsheet for vital signs taken during this treatment.  After treatment:   [x]  Patient left in no apparent distress sitting up in w/c with needs met   []  Patient left in no apparent distress in bed  []  Patient handoff to SLP/PT  [x]  Call bell and immediate needs left within reach  [x]  Nursing notified  []  Caregiver present  []  Bed alarm activated  [x]  Wheelchair alarm activated  []  Pato Prominences offloaded  []  Heels activated for pressure relief  []  Telesitter/Care companion present    Entered Differentiated Treatment minutes: yes    Silvia Sanders OT  12/2/2024

## 2024-12-02 NOTE — PROGRESS NOTES
[facility]  (A Division of [facility])      Consultation Note      Date of Admission: 11/21/2024    Date of Note: 12/2/2024      Reason for Referral: Dental abscess and right facial swelling  Referring Physician: Dr. Megan Hwang from this admission:   None    Current Antimicrobials:    Prior Antimicrobials:  Augmentin 11/21 to present Unasyn 11/18 to 11/21  Clindamycin 11/18 to 11/19       Assessment:         Right first molar dental abscess with associated soft tissue swelling and cellulitis to the right lower face:    -No drainable fluid collection noted on CT of the head and neck or CTA.   -OMFS contacted by the ED and advised against any surgical procedure at this time.    Right upper and lower extremity weakness: Admitted for stroke workup.  MRI of brain pending.    Plan:   Continue Augmentin 875 p.o. twice daily.   -Stop date 12/1  Needs to see a dentist or [facility]FS after completion of her antibiotics in 2 weeks.  Family is helping to arrange this.    Trend CBC, BMP 1-2 times per week    Discussed with Dr. Briseyda Daniel DO  [facility]  [address], [address] [address]  Office: [phone], Ext [phone]    Subjective:   Tired.  No fevers.     Tmax 98.7  WBC None today     HPI:  Ms. Martinez is a 44-year-old female with a history of seizure disorder who came to the emergency department with complaints of neurological changes as well as right jaw pain.  She says that her jaw pain initially had started on Saturday when she had some tenderness and pain in the lower right anterior jaw.  She had gone to the emergency department and gotten some Percocet as well as amoxicillin.  She had made an appointment to go to the dentist but they wanted her to complete her antibiotics before they would perform any procedures.  The pain and swelling had acutely worsened Sunday night into Monday morning.  She then  started having fevers and chills as well for the last 24 hours.  Additionally, she was having some weakness in the right upper and lower extremity with right sided facial paresthesias.  A code stroke was called and she had a CT of the head and neck.  As part of that workup, her CT scan had revealed some right facial soft tissue swelling over the right mandible with underlying periapical lucency in the roots of the right mandibular first molar consistent with an acute dental infection.  No drainable abscess was noted.  The ED has contacted [facility] at [facility] as well as [facility] and they had declined transfer and stated that no additional intervention was needed at this time outside of antibiotics.  When the patient was seen today, she continues to have ongoing pain in the right jaw however she feels as though the swelling is about 50% better than what it had been yesterday evening.  She is not having any fevers or chills.  She still having a difficult time opening her mouth secondary to pain.  She does not remark upon any kind of right sided weakness in her upper or lower extremities at this time.           Objective:      /68   Pulse 83   Temp 98.7 °F (37.1 °C) (Oral)   Resp 20   Ht 1.727 m (5' 8\")   Wt 89.8 kg (198 lb)   LMP 2024 (Approximate)   SpO2 98%   BMI 30.11 kg/m²   Temp (24hrs), Av.5 °F (36.9 °C), Min:98.3 °F (36.8 °C), Max:98.7 °F (37.1 °C)        General:   awake alert and oriented, appears stated age   Skin:   no rashes or skin lesions noted on limited exam, no jaundice   HEENT:  Normocephalic, atraumatic, PERRL, EOMI, no scleral icterus; no conjunctival hemmohage; resolved swelling to the anterior middle portion of the right mandible, no other notable skin changes.     Lymph Nodes:   no cervical or inguinal adenopathy   Lungs:   non-labored, bilaterally clear to aspiration   Heart:  RRR, s1 and s2; no murmurs, no edema, + pedal pulses   Abdomen:  soft, non-distended, active bowel

## 2024-12-02 NOTE — PROGRESS NOTES
4 Eyes Skin Assessment     NAME:  Shaila Martinez  YOB: 1980  MEDICAL RECORD NUMBER:  [id number]    The patient is being assessed for  Shift Handoff    I agree that at least one RN has performed a thorough Head to Toe Skin Assessment on the patient. ALL assessment sites listed below have been assessed.      Areas assessed by both nurses:    Head, Face, Ears, Shoulders, Back, Chest, Arms, Elbows, Hands, Sacrum. Buttock, Coccyx, Ischium, and Legs. Feet and Heels        Does the Patient have a Wound? No noted wound(s)       Kwan Prevention initiated by RN: Yes  Wound Care Orders initiated by RN: No    Pressure Injury (Stage 3,4, Unstageable, DTI, NWPT, and Complex wounds) if present, place Wound referral order by RN under : No    New Ostomies, if present place, Ostomy referral order under : No     Nurse 1 eSignature: Electronically signed by Lisa Brown RN on 12/2/24 at 6:10 AM EST    **SHARE this note so that the co-signing nurse can place an eSignature**    Nurse 2 eSignature: Electronically signed by Edilberto Rothman RN on 12/2/24 at 7:51 AM EST

## 2024-12-03 PROCEDURE — 97535 SELF CARE MNGMENT TRAINING: CPT

## 2024-12-03 PROCEDURE — 97112 NEUROMUSCULAR REEDUCATION: CPT

## 2024-12-03 PROCEDURE — 97110 THERAPEUTIC EXERCISES: CPT

## 2024-12-03 PROCEDURE — 97116 GAIT TRAINING THERAPY: CPT

## 2024-12-03 PROCEDURE — 99232 SBSQ HOSP IP/OBS MODERATE 35: CPT | Performed by: EMERGENCY MEDICINE

## 2024-12-03 PROCEDURE — 97530 THERAPEUTIC ACTIVITIES: CPT

## 2024-12-03 PROCEDURE — 6370000000 HC RX 637 (ALT 250 FOR IP): Performed by: INTERNAL MEDICINE

## 2024-12-03 PROCEDURE — 6360000002 HC RX W HCPCS: Performed by: HOSPITALIST

## 2024-12-03 PROCEDURE — 94761 N-INVAS EAR/PLS OXIMETRY MLT: CPT

## 2024-12-03 PROCEDURE — 1180000000 HC REHAB R&B

## 2024-12-03 RX ADMIN — ASPIRIN 81 MG: 81 TABLET, COATED ORAL at 08:25

## 2024-12-03 RX ADMIN — AMOXICILLIN AND CLAVULANATE POTASSIUM 1 TABLET: 875; 125 TABLET, FILM COATED ORAL at 05:43

## 2024-12-03 RX ADMIN — ENOXAPARIN SODIUM 40 MG: 100 INJECTION SUBCUTANEOUS at 08:24

## 2024-12-03 RX ADMIN — POLYETHYLENE GLYCOL 3350 17 G: 17 POWDER, FOR SOLUTION ORAL at 22:31

## 2024-12-03 RX ADMIN — ATORVASTATIN CALCIUM 80 MG: 40 TABLET, FILM COATED ORAL at 08:25

## 2024-12-03 ASSESSMENT — PAIN SCALES - GENERAL
PAINLEVEL_OUTOF10: 0

## 2024-12-03 ASSESSMENT — PAIN SCALES - WONG BAKER
WONGBAKER_NUMERICALRESPONSE: NO HURT

## 2024-12-03 NOTE — PROGRESS NOTES
ADL Accessibility Limitations: none        Physical Therapy Making gains Yes   Goal: Pt will walk 150 ft with supervision and rolling walker      Barrier: decreased strength and balance       Intervention: gait training, strengthening       Household Mobility Accessibility Limitations:none                                Week 1 Therapy Minutes Density Met: Yes  Total minutes week to date: Day 7 1132 mins/ 900 mins  Week 2 Therapy Minutes Density Met: Yes  Total minutes week to date: Day 12 495 mins/ 900 mins       Therapy Minutes Not Met Action/Justification:   [] Pt on medical hold  [] Pt refusing therapy despite encouragement and education on benefits of therapy  [] Pt displays decreased tolerance to therapy  [] Other   Action Plan to Make Up Minutes: n/a    CMG Date: 12/3/2024  Estimated Discharge Date: 12/7/2024  [x]Rehabilitation goals from IPOC/Treatment plan reviewed  [x]No changes identified  []Goal(s) changed:     Patient needs identified [x]Caregiver Education   []Caregiver Conference         Recommended Discharge Plan []home alone   []home alone with assist prn    []Home with continuous supervision       [x]Home with continuous assistance   [] Assisted living                     []SNF   Home Health pt, ot     CURRENT EQUIPMENT NEEDS:  [x]Handicap Placard Application    Equipment needed at discharge: Rolling walker    ADL Equipment:Tub bench    Plan/Adjustments to Plan   [x]Medical conditions exist that require a minimum of 3 times/week physician      oversight and 24-hour rehabilitation nursing to manage/progress the plan of care   [x]Functional deficits require intensive and coordinated therapies to achieve   goals outlined in plan of care   [x]3 hours therapy 5 days/week OR 15 hours therapy over 7 days   [x]Continued plan of care as patient is showing progress and /or has an expectation to benefit    Team Focus:  n/a     Patient's plan of care has been reviewed and/or adjusted. Continue treatment as  outlined.   I have led this Team Conference and agree with the plan, Guzman Rain MD, 12/3/2024, 12:56 PM      Team Conference Recorder Elizabeth Soriano  Date 12/3/2024    Team members participating in today's conference.   [] Olivia Lundberg, PT     [x] Greg Cloud, PT           []    Sara Howard, SLP       [] Louisa Texieira, OT      [x] Silvia Sanders, OT                    []  Ashely Leger, SLP  [x] Elizabeth Soriano, SW        [] Nina Greenfield, PT           [x]  Ayesha Mathis RN   [x] RN: Edilberto Rothman RN       [] Chacha Kerr NP    [] Other:

## 2024-12-03 NOTE — PROGRESS NOTES
[facility]  [address] [place] [id number]     INPATIENT REHABILITATION  DAILY PROGRESS NOTE     Date: 12/3/2024    Name: Shaila Martinez Age / Sex: 44 y.o. / female   CSN: [id number] MRN: [id number]   Admit Date: 11/21/2024 Length of Stay: 12 days     Primary Rehabilitation Diagnosis: 1.  Impaired mobility and ADLs secondary right-sided weakness.    Subjective:     I personally saw and evaluated this patient face-to-face today.  Patient is sitting in a chair in no apparent distress.  Denies any pain    Objective:     Vital Signs:  Patient Vitals for the past 24 hrs:   BP Temp Temp src Pulse Resp SpO2   12/03/24 1554 104/61 98.3 °F (36.8 °C) Oral 81 20 98 %   12/03/24 0745 109/73 98.7 °F (37.1 °C) Oral 80 20 97 %   12/02/24 1928 105/70 97.8 °F (36.6 °C) Oral 79 18 95 %        Physical Examination:  General:  Awake, alert  Cardiovascular:  S1S2+, RRR  Pulmonary:  CTA b/l  GI:  Soft, BS+, NT, ND  Extremities:  No edema  Right-sided weakness    Current Medications:  Current Facility-Administered Medications   Medication Dose Route Frequency    enoxaparin (LOVENOX) injection 40 mg  40 mg SubCUTAneous Daily    acetaminophen (TYLENOL) tablet 650 mg  650 mg Oral Q4H PRN    aspirin EC tablet 81 mg  81 mg Oral Daily    atorvastatin (LIPITOR) tablet 80 mg  80 mg Oral Daily    oxyCODONE-acetaminophen (PERCOCET) 5-325 MG per tablet 2 tablet  2 tablet Oral Q6H PRN    polyethylene glycol (GLYCOLAX) packet 17 g  17 g Oral Daily PRN    ondansetron (ZOFRAN) tablet 4 mg  4 mg Oral Q8H PRN       Allergies:  No Known Allergies    Lab/Data Review:  No results found for this or any previous visit (from the past 24 hour(s)).        Assessment:     Primary Rehab Diagnosis   1.  Impaired mobility and ADLs secondary right-sided weakness.  2.  Right first molar abscess associated with soft tissue swelling and cellulitis of the face      MEDICAL PLAN:     For right-sided weakness, patient will benefit  from intense PT, OT and speech therapy.  Patient is on aspirin and statin.   Continue aspirin and statin  continue Augmentin until December 1, 2024 per ID and Percocet as needed for pain management    11/26/2024  Will continue current treatment  Continue PT/OT/SLP eval and treatment    11/27-I discussed the care plan with the patient.  Continue aspirin and statin.  Complete course of antibiotics.  Continue therapy.    11/29-continue therapy.  Complete course of antibiotics.  ID is following.  I discussed care plan with patient and family at bedside    12/2-status post course of antibiotics.  Continue therapy.  Will order p.o. Dulcolax today.  I discussed care plan with the patient    12/3-status post course of antibiotics.  Continue therapy.  I discussed care plan with the patient.  I discussed with the  and the therapists.    Functional Progress:    PHYSICAL THERAPY    ON ADMISSION MOST RECENT   Balance  Fair  Good  Good  Fair (with RW)  Fair;- (with RW)      Balance  Posture: Fair  Sitting - Static: Good  Sitting - Dynamic: Good  Standing - Static: Fair (with RW)  Standing - Dynamic: Fair;- (with RW)        Bed Mobility  Minimal assistance  Minimal assistance  Minimal assistance (min assist for RLE)  Moderate Assistance  Minimal assistance (Min assist for RLE)  Moderate assistance  Moderate Assistance Bed Mobility  Rolling to Right: Supervision  Rolling to Left: Supervision (patient relies heavily on momentum to achieve roll)  Supine to Sit: Modified independent  Sit to Stand: Stand by assistance  Sit to Supine: Supervision  Bed to Chair: Stand by assistance  Car Transfer: Contact guard assistance   Wheelchair Mobility  Yes       Wheelchair Mobility  Propulsion: Yes         Ambulation  Rolling Walker  Minimal assistance  Level tile  30 feet Ambulation  Device: Rolling Walker  Assistance: Modified Independent (first lap distannt supervision and 2nd lap modified independent)  Surface: Level

## 2024-12-03 NOTE — PLAN OF CARE
left within reach  [x]  Nursing notified  []  Caregiver present  []  Bed alarm activated  [x]  Wheelchair alarm activated  []  Pato Prominences offloaded  []  Heels activated for pressure relief  []  Telesitter/Care companion present    Entered Differentiated Treatment minutes: yes    Silvia Sanders OT  12/3/2024

## 2024-12-03 NOTE — PLAN OF CARE
neuromuscular re-education, education.    Pain:  Pt pain was reported as 0 pre-treatment.  Pt pain was reported as 0 post-treatment.    Patient identified with name and : Yes    SUBJECTIVE:      \"I want to keep working on walking\"    OBJECTIVE DATA SUMMARY:    Objective:   Education:   Education Given To: Patient  Education Provided: Mobility Training;Transfer Training;Fall Prevention Strategies;Safety;Energy Conservation;Home Exercise Program  Education Method: Demonstration;Verbal  Barriers to Learning: None  Education Outcome: Verbalized understanding;Demonstrated understanding;Continued education needed    TRANSFERS Daily Assessment    Sit to Stand Stand by assistance    Transfer Assist Score Stand by assistance  Stand by assistance          Comments    SBA    Car Transfer      Car Type         GAIT Daily Assessment    Gait Deviations Slow Ariella;Increased BASSAM;Decreased step height    Assistive device Rolling Walker    Ambulation assistance - surface    Level tile    Distance 2x150 feet    Comments Mod I with RW    Ambulation-uneven surface              BALANCE Daily Assessment    Posture Fair    Sitting - Static Good    Sitting - Dynamic Good    Standing - Static Fair (with RW)    Standing - Dynamic Fair;- (with RW)    Comments        WHEELCHAIR MOBILITY/MANAGEMENT Daily Assessment   Able to Propel 275 feet   Assist Level Modified independent   Curbs/ramps assistance required     Wheelchair management     Comments      THERAPEUTIC EXERCISES Daily Assessment    Seated -3x15 LAQ with 2# ankle weight  -3x15 marches with 2# ankle weight (LLE only, RLE unweighted)  -3x15 hip abduction with yellow TB  -3x15 hip adduction against bolster  -3x15 hamstring curls with yellow TB  -30x heel raises        ASSESSMENT:  Patient tolerated today's session well and continues to make progress. She was able to ambulate x2 bouts of 150 feet with rolling walker and modified independence. She was given and reviewed print out of  lower extremity home exercise program. She tolerated all therapeutic exercises well with rest as needed.   Progression toward goals:  [x]      Improving appropriately and progressing toward goals  []      Improving slowly and progressing toward goals  []      Not making progress toward goals and plan of care will be adjusted      PLAN:  Patient continues to benefit from skilled intervention to address the above impairments.  Continue treatment per established plan of care.  Discharge Recommendations:  24 hour supervision or assist;Home with Home health PT  Further Equipment Recommendations for Discharge:        Rolling             Estimated Discharge Date:12/7    Activity Tolerance:   Fair  Please refer to the flowsheet for vital signs taken during this treatment.    After treatment:   [] Patient left in no apparent distress in bed  [x] Patient left in no apparent distress sitting up in chair  [] Patient left in no apparent distress in w/c mobilizing under own power  [] Patient left in no apparent distress dining area  [] Patient left in no apparent distress mobilizing under own power  [x] Call bell left within reach  [] Nursing notified  [] Caregiver present  [] Bed alarm activated   [x] Chair alarm activated        Nina Greenfield, PT  12/3/2024

## 2024-12-03 NOTE — PROGRESS NOTES
4 Eyes Skin Assessment     NAME:  Shaila Martinez  YOB: 1980  MEDICAL RECORD NUMBER:  [id number]    The patient is being assessed for  Shift Handoff    I agree that at least one RN has performed a thorough Head to Toe Skin Assessment on the patient. ALL assessment sites listed below have been assessed.      Areas assessed by both nurses:    Head, Face, Ears, Shoulders, Back, Chest, Arms, Elbows, Hands, Sacrum. Buttock, Coccyx, Ischium, and Legs. Feet and Heels        Does the Patient have a Wound? No noted wound(s)       Kwan Prevention initiated by RN: Yes  Wound Care Orders initiated by RN: No    Pressure Injury (Stage 3,4, Unstageable, DTI, NWPT, and Complex wounds) if present, place Wound referral order by RN under : No    New Ostomies, if present place, Ostomy referral order under : No     Nurse 1 eSignature: Electronically signed by Lisa Brown RN on 12/3/24 at 6:22 AM EST    **SHARE this note so that the co-signing nurse can place an eSignature**    Nurse 2 eSignature: Electronically signed by Edilberto Rothman RN on 12/3/24 at 9:03 AM EST

## 2024-12-03 NOTE — PLAN OF CARE
Problem: Safety - Adult  Goal: Free from fall injury  Outcome: Progressing  Flowsheets (Taken 12/2/2024 2005)  Free From Fall Injury: Instruct family/caregiver on patient safety     Problem: ABCDS Injury Assessment  Goal: Absence of physical injury  Outcome: Progressing  Flowsheets (Taken 12/2/2024 2005)  Absence of Physical Injury: Implement safety measures based on patient assessment     Problem: Skin/Tissue Integrity  Goal: Absence of new skin breakdown  Description: 1.  Monitor for areas of redness and/or skin breakdown  2.  Assess vascular access sites hourly  3.  Every 4-6 hours minimum:  Change oxygen saturation probe site  4.  Every 4-6 hours:  If on nasal continuous positive airway pressure, respiratory therapy assess nares and determine need for appliance change or resting period.  Outcome: Progressing

## 2024-12-03 NOTE — PROGRESS NOTES
TEAM CONFERENCE FOLLOW-UP  Patient: Shaila Martinez (44 y.o. female)  Date: 12/3/2024  Diagnosis: Right sided weakness [R53.1] Right sided weakness      Precautions:      Met with patient to discuss the findings from 12/3/2024 Team Conference.    Patient requested further information and/or had questions:   Nafisa Soriano

## 2024-12-03 NOTE — PROGRESS NOTES
4 Eyes Skin Assessment     NAME:  Shaila Martinez  YOB: 1980  MEDICAL RECORD NUMBER:  [id number]    The patient is being assessed for  Shift Handoff    I agree that at least one RN has performed a thorough Head to Toe Skin Assessment on the patient. ALL assessment sites listed below have been assessed.      Areas assessed by both nurses:    Arms, Elbows, Hands, Sacrum. Buttock, Coccyx, Ischium, and Legs. Feet and Heels        Does the Patient have a Wound? No noted wound(s)       Kwan Prevention initiated by RN: Yes  Wound Care Orders initiated by RN: No    Pressure Injury (Stage 3,4, Unstageable, DTI, NWPT, and Complex wounds) if present, place Wound referral order by RN under : No    New Ostomies, if present place, Ostomy referral order under : No     Nurse 1 eSignature: Electronically signed by Edilberto Rothman RN on 12/3/24 at 3:16 PM EST    **SHARE this note so that the co-signing nurse can place an eSignature**    Nurse 2 eSignature: Electronically signed by Lisa Brown RN on 12/4/24 at 6:47 AM EST

## 2024-12-03 NOTE — PLAN OF CARE
simulating her set-up at home. Patient could benefit from continued skilled PT to increase strength and balance to allow patient to progress to modified independent with all mobility before returning home.   Progression toward goals:  [x]      Improving appropriately and progressing toward goals  []      Improving slowly and progressing toward goals  []      Not making progress toward goals and plan of care will be adjusted      PLAN:  Patient continues to benefit from skilled intervention to address the above impairments.  Continue treatment per established plan of care.  Discharge Recommendations:  24 hour supervision or assist;Home with Home health PT  Further Equipment Recommendations for Discharge:        Rolling             Estimated Discharge Date:12/7/24    Activity Tolerance:   Fair  Please refer to the flowsheet for vital signs taken during this treatment.    After treatment:   [] Patient left in no apparent distress in bed  [x] Patient left in no apparent distress sitting up in chair  [] Patient left in no apparent distress in w/c mobilizing under own power  [] Patient left in no apparent distress dining area  [] Patient left in no apparent distress mobilizing under own power  [x] Call bell left within reach  [] Nursing notified  [] Caregiver present  [] Bed alarm activated   [x] Chair alarm activated        Robyn Pérez, ANDI  12/3/2024

## 2024-12-04 PROCEDURE — 97116 GAIT TRAINING THERAPY: CPT

## 2024-12-04 PROCEDURE — 97535 SELF CARE MNGMENT TRAINING: CPT

## 2024-12-04 PROCEDURE — 97110 THERAPEUTIC EXERCISES: CPT

## 2024-12-04 PROCEDURE — 97530 THERAPEUTIC ACTIVITIES: CPT

## 2024-12-04 PROCEDURE — 6360000002 HC RX W HCPCS: Performed by: HOSPITALIST

## 2024-12-04 PROCEDURE — 6370000000 HC RX 637 (ALT 250 FOR IP): Performed by: INTERNAL MEDICINE

## 2024-12-04 PROCEDURE — 1180000000 HC REHAB R&B

## 2024-12-04 PROCEDURE — 99232 SBSQ HOSP IP/OBS MODERATE 35: CPT | Performed by: EMERGENCY MEDICINE

## 2024-12-04 PROCEDURE — 6370000000 HC RX 637 (ALT 250 FOR IP): Performed by: EMERGENCY MEDICINE

## 2024-12-04 PROCEDURE — 94761 N-INVAS EAR/PLS OXIMETRY MLT: CPT

## 2024-12-04 RX ORDER — SENNA AND DOCUSATE SODIUM 50; 8.6 MG/1; MG/1
2 TABLET, FILM COATED ORAL DAILY
Status: DISCONTINUED | OUTPATIENT
Start: 2024-12-04 | End: 2024-12-06 | Stop reason: HOSPADM

## 2024-12-04 RX ORDER — BISACODYL 10 MG
10 SUPPOSITORY, RECTAL RECTAL DAILY PRN
Status: DISCONTINUED | OUTPATIENT
Start: 2024-12-04 | End: 2024-12-04

## 2024-12-04 RX ORDER — BISACODYL 10 MG
10 SUPPOSITORY, RECTAL RECTAL ONCE
Status: COMPLETED | OUTPATIENT
Start: 2024-12-04 | End: 2024-12-04

## 2024-12-04 RX ADMIN — OXYCODONE HYDROCHLORIDE AND ACETAMINOPHEN 2 TABLET: 5; 325 TABLET ORAL at 20:46

## 2024-12-04 RX ADMIN — ENOXAPARIN SODIUM 40 MG: 100 INJECTION SUBCUTANEOUS at 08:25

## 2024-12-04 RX ADMIN — BISACODYL 10 MG: 10 SUPPOSITORY RECTAL at 08:26

## 2024-12-04 RX ADMIN — ATORVASTATIN CALCIUM 80 MG: 40 TABLET, FILM COATED ORAL at 08:26

## 2024-12-04 RX ADMIN — ASPIRIN 81 MG: 81 TABLET, COATED ORAL at 08:26

## 2024-12-04 ASSESSMENT — PAIN DESCRIPTION - ORIENTATION: ORIENTATION: RIGHT

## 2024-12-04 ASSESSMENT — PAIN SCALES - GENERAL
PAINLEVEL_OUTOF10: 0
PAINLEVEL_OUTOF10: 7
PAINLEVEL_OUTOF10: 0
PAINLEVEL_OUTOF10: 0
PAINLEVEL_OUTOF10: 2

## 2024-12-04 ASSESSMENT — PAIN DESCRIPTION - PAIN TYPE: TYPE: ACUTE PAIN

## 2024-12-04 ASSESSMENT — PAIN - FUNCTIONAL ASSESSMENT: PAIN_FUNCTIONAL_ASSESSMENT: PREVENTS OR INTERFERES WITH MANY ACTIVE NOT PASSIVE ACTIVITIES

## 2024-12-04 ASSESSMENT — PAIN DESCRIPTION - ONSET: ONSET: GRADUAL

## 2024-12-04 ASSESSMENT — PAIN DESCRIPTION - DESCRIPTORS: DESCRIPTORS: ACHING;DISCOMFORT

## 2024-12-04 ASSESSMENT — PAIN DESCRIPTION - LOCATION: LOCATION: HIP

## 2024-12-04 ASSESSMENT — PAIN DESCRIPTION - DIRECTION: RADIATING_TOWARDS: DENIES

## 2024-12-04 ASSESSMENT — PAIN DESCRIPTION - FREQUENCY: FREQUENCY: INTERMITTENT

## 2024-12-04 NOTE — PROGRESS NOTES
4 Eyes Skin Assessment     NAME:  Shaila Martinez  YOB: 1980  MEDICAL RECORD NUMBER:  [id number]    The patient is being assessed for  Shift Handoff    I agree that at least one RN has performed a thorough Head to Toe Skin Assessment on the patient. ALL assessment sites listed below have been assessed.      Areas assessed by both nurses:    Head, Face, Ears, Shoulders, Back, Chest, Arms, Elbows, Hands, Sacrum. Buttock, Coccyx, Ischium, and Legs. Feet and Heels        Does the Patient have a Wound? No noted wound(s)       Kwan Prevention initiated by RN: Yes  Wound Care Orders initiated by RN: No    Pressure Injury (Stage 3,4, Unstageable, DTI, NWPT, and Complex wounds) if present, place Wound referral order by RN under : No    New Ostomies, if present place, Ostomy referral order under : No     Nurse 1 eSignature: Electronically signed by Lisa Brown RN on 12/4/24 at 6:47 AM EST    **SHARE this note so that the co-signing nurse can place an eSignature**    Nurse 2 eSignature: Electronically signed by Batool Rosen RN on 12/4/24 at 4:08 PM EST

## 2024-12-04 NOTE — PLAN OF CARE
to not having a bowel movement in 4 days.  Attempted to  treat patient at 9:30 however requested to hold off a little to see if she could have a bowel movement. Went back to see patient and 10:00 and since RN had given patient suppository she would participate however wanted to stay in her room. Patient performed transfers with supervision and tolerated seated PRE's well. Patient could benefit from continued skilled PT to address decreased strength and balance to improve transfers and increase safety once returning home.  Progression toward goals:  [x]      Improving appropriately and progressing toward goals  []      Improving slowly and progressing toward goals  []      Not making progress toward goals and plan of care will be adjusted      PLAN:  Patient continues to benefit from skilled intervention to address the above impairments.  Continue treatment per established plan of care.  Discharge Recommendations:  24 hour supervision or assist;Home with Home health PT  Further Equipment Recommendations for Discharge:        Rolling             Estimated Discharge Date:12/7/24    Activity Tolerance:   Fair (limited by abdominal pain)  Please refer to the flowsheet for vital signs taken during this treatment.    After treatment:   [] Patient left in no apparent distress in bed  [x] Patient left in no apparent distress sitting up in chair  [] Patient left in no apparent distress in w/c mobilizing under own power  [] Patient left in no apparent distress dining area  [] Patient left in no apparent distress mobilizing under own power  [x] Call bell left within reach  [] Nursing notified  [] Caregiver present  [] Bed alarm activated   [x] Chair alarm activated        Robyn Pérez PTA  12/4/2024

## 2024-12-04 NOTE — PLAN OF CARE
Problem: Safety - Adult  Goal: Free from fall injury  Outcome: Progressing  Flowsheets (Taken 12/3/2024 2019)  Free From Fall Injury: Instruct family/caregiver on patient safety     Problem: ABCDS Injury Assessment  Goal: Absence of physical injury  Outcome: Progressing  Flowsheets (Taken 12/3/2024 2019)  Absence of Physical Injury: Implement safety measures based on patient assessment     Problem: Skin/Tissue Integrity  Goal: Absence of new skin breakdown  Description: 1.  Monitor for areas of redness and/or skin breakdown  2.  Assess vascular access sites hourly  3.  Every 4-6 hours minimum:  Change oxygen saturation probe site  4.  Every 4-6 hours:  If on nasal continuous positive airway pressure, respiratory therapy assess nares and determine need for appliance change or resting period.  Outcome: Progressing

## 2024-12-04 NOTE — PROGRESS NOTES
4 Eyes Skin Assessment     NAME:  Shaila Martinez  YOB: 1980  MEDICAL RECORD NUMBER:  [id number]    The patient is being assessed for  Shift Handoff    I agree that at least one RN has performed a thorough Head to Toe Skin Assessment on the patient. ALL assessment sites listed below have been assessed.      Areas assessed by both nurses:    Head, Face, Ears, Shoulders, Back, Chest, Arms, Elbows, Hands, Sacrum. Buttock, Coccyx, Ischium, and Legs. Feet and Heels        Does the Patient have a Wound? No noted wound(s)       Kwan Prevention initiated by RN: Yes  Wound Care Orders initiated by RN: No    Pressure Injury (Stage 3,4, Unstageable, DTI, NWPT, and Complex wounds) if present, place Wound referral order by RN under : No    New Ostomies, if present place, Ostomy referral order under : No     Nurse 1 eSignature: Electronically signed by Batool Rosen RN on 12/4/24 at 4:08 PM EST    **SHARE this note so that the co-signing nurse can place an eSignature**    Nurse 2 eSignature: Electronically signed by Kriss Kidd RN on 12/4/24 at 7:39 PM EST

## 2024-12-04 NOTE — PLAN OF CARE
independence and safety with self-care performance.     Patient continues to benefit from skilled occupational therapy to address decreased (I) with ADL's, decreased (I) with LB self-cares, impaired RUE AROM, impaired RUE FMC, RUE hemiparesis/ non-functional use, impaired RUE strength, impaired dynamic sitting balance, impaired static/ dynamic standing balance, and impaired functional transfers/ mobility limiting pt performance, independence and safety with ADL/IADL's and functional mobility for return to prior level of functioning.     Progression toward goals:  [x]          Improving appropriately and progressing toward goals  []          Improving slowly and progressing toward goals  []          Not making progress toward goals and plan of care will be adjusted     PLAN:  Patient continues to benefit from skilled intervention to address the above impairments.  Continue treatment per established plan of care.  Discharge Recommendations:   Home occupational therapy with 24 hr assist  Further Equipment Recommendations for Discharge:  tub transfer bench  Estimated LOS: 12/7/2024      COMMUNICATION/EDUCATION:   [x] Home safety education was provided and the patient/caregiver indicated understanding.  [x] Patient/family have participated as able in goal setting and plan of care.  [x] Patient/family agree to work toward stated goals and plan of care.  [] Patient understands intent and goals of therapy, but is neutral about his/her participation.  [] Patient is unable to participate in goal setting and plan of care.    Please refer to the flow sheet for vital signs taken during this treatment.  After treatment:   []  Patient left in no apparent distress sitting up in chair  [x]  Patient left in no apparent distress in bed  []  Patient handoff to SLP/PT  [x]  Call bell and immediate needs left within reach  [x]  Nursing notified  []  Caregiver present  [x]  Bed alarm activated         Plan of Care: Please see Care Plan

## 2024-12-04 NOTE — PLAN OF CARE
Problem: Safety - Adult  Goal: Free from fall injury  Outcome: Progressing     Problem: ABCDS Injury Assessment  Goal: Absence of physical injury  Outcome: Progressing     Problem: Physical Therapy - Adult  Goal: By Discharge: Performs mobility at highest level of function for planned discharge setting.  See evaluation for individualized goals.  Description: Physical Therapy Short Term Goals  Initiated 11/22/2024 and to be accomplished within 7 day(s) (11/29)  1.  Patient will supine to sit, sit to supine, roll right, and roll left  in bed with supervision.  (MET 11/29/24)  2.  Patient will transfer from bed to chair and chair to bed with contact guard assist using the least restrictive device. (MET 11/29/24)  3.  Patient will perform sit to stand with contact guard assist. (MET 11/29/24)  4.  Patient will ambulate with minimal assistance for 50 feet with the least restrictive device.  (MET 11/29/24)  5.  Patient will ascend/descend 3 stairs with bilateral handrail(s) with minimal assistance. (MET 11/29/24)    Physical Therapy Long Term Goals  Initiated 11/22/2024 and to be accomplished within 21 day(s) (12/13)  1.  Patient will supine to sit, sit to supine, roll right, and roll left in bed with modified independence.    2.  Patient will transfer from bed to chair and chair to bed with supervision/set-up using the least restrictive device.  3.  Patient will perform sit to stand with supervision/set-up.  4.  Patient will ambulate with contact guard assist for 150 feet with the least restrictive device.   Updated goal:Patient will ambulate with stand by assist/supervision for 150 feet with the least restrictive device.   5.  Patient will ascend/descend 12 stairs with bilateral handrail(s) with contact guard assist.  12/4/2024 0800 by Robyn Pérez PTA  Outcome: Progressing     Problem: Occupational Therapy - Adult  Goal: By Discharge: Performs self-care activities at highest level of function for planned  discharge setting.  See evaluation for individualized goals.  Description: Occupational Therapy Goals   Long Term Goals  Initiated (11/22/2024) and to be accomplished within 3 week(s) (12/7/2024)  1. Pt will perform self-feeding with Mod I. (Goal met 12/4/2024)  2. Pt will perform grooming with supv. (Goal met 12/4/2024)  3. Pt will perform UB bathing with supv. (Goal met 12/4/2024)  Goal upgraded: Pt will perform UB bathing with set-up.   4. Pt will perform LB bathing with CGA using AE as needed.  5. Pt will perform tub/shower transfer with CGA.   6. Pt will perform UB dressing with supv. (Goal met 12/4/2024)  Goal upgraded: Pt will perform UB dressing with set-up.   7. Pt will perform LB dressing with CGA using AE as needed.  8. Pt will perform toileting task with CGA.  9. Pt will perform toilet transfer with CGA using least restrictive assistive device.    Short Term Goals   Initiated (11/22/2024) and to be accomplished within 7 day(s); (updated on 11/27/2024)  1. Pt will perform self-feeding with set-up. ( 11/27/24)  2. Pt will perform grooming with Min A. ( 11/27/24)  3. Pt will perform UB bathing with Min A.  ( 11/27/24)  4. Pt will perform LB bathing with Mod A using AE as needed. ( 11/27/24)  5. Pt will perform tub/shower transfer with Min A. ( 11/27/24)  6. Pt will perform UB dressing with SBA/ Min A. ( 11/27/24)  7. Pt will perform LB dressing with Mod A using AE as needed. ( 11/27/24)  8. Pt will perform toileting task with Min A. ( 11/27/24)  9. Pt will perform toilet transfer with Min A using least restrictive assistive device. ( 11/27/24)    12/4/2024 0843 by Silvia Sanders OT  Outcome: Progressing     Problem: Neurosensory - Adult  Goal: Achieves stable or improved neurological status  Outcome: Progressing     Problem: Musculoskeletal - Adult  Goal: Return mobility to safest level of function  Outcome: Progressing  Goal: Return ADL status to a safe level of function  Outcome:

## 2024-12-04 NOTE — PLAN OF CARE
Problem: Physical Therapy - Adult  Goal: By Discharge: Performs mobility at highest level of function for planned discharge setting.  See evaluation for individualized goals.  Description: Physical Therapy Short Term Goals  Initiated 11/22/2024 and to be accomplished within 7 day(s) (11/29)  1.  Patient will supine to sit, sit to supine, roll right, and roll left  in bed with supervision.  (MET 11/29/24)  2.  Patient will transfer from bed to chair and chair to bed with contact guard assist using the least restrictive device. (MET 11/29/24)  3.  Patient will perform sit to stand with contact guard assist. (MET 11/29/24)  4.  Patient will ambulate with minimal assistance for 50 feet with the least restrictive device.  (MET 11/29/24)  5.  Patient will ascend/descend 3 stairs with bilateral handrail(s) with minimal assistance. (MET 11/29/24)    Physical Therapy Long Term Goals  Initiated 11/22/2024 and to be accomplished within 21 day(s) (12/13)  1.  Patient will supine to sit, sit to supine, roll right, and roll left in bed with modified independence.    2.  Patient will transfer from bed to chair and chair to bed with supervision/set-up using the least restrictive device.  3.  Patient will perform sit to stand with supervision/set-up.  4.  Patient will ambulate with contact guard assist for 150 feet with the least restrictive device.   Updated goal:Patient will ambulate with stand by assist/supervision for 150 feet with the least restrictive device.   5.  Patient will ascend/descend 12 stairs with bilateral handrail(s) with contact guard assist.  Outcome: Progressing       PHYSICAL THERAPY TREATMENT    Patient: Shaila Martinez (44 y.o. female)  Date: 12/4/2024  Diagnosis: Right sided weakness [R53.1]   Precautions: falls  Chart, physical therapy assessment, plan of care and goals were reviewed.    Time in: 1100  Time out : 1135    Patient seen for: therapeutic activity, therapeutic exercise, gait training,  skilled intervention to address the above impairments.  Continue treatment per established plan of care.  Discharge Recommendations:  24 hour supervision or assist;Home with Home health PT  Further Equipment Recommendations for Discharge:        Rolling             Estimated Discharge Date:12/7    Activity Tolerance:   Fair  Please refer to the flowsheet for vital signs taken during this treatment.    After treatment:   [] Patient left in no apparent distress in bed  [x] Patient left in no apparent distress sitting up in chair  [] Patient left in no apparent distress in w/c mobilizing under own power  [] Patient left in no apparent distress dining area  [] Patient left in no apparent distress mobilizing under own power  [x] Call bell left within reach  [] Nursing notified  [] Caregiver present  [] Bed alarm activated   [x] Chair alarm activated        Nina Greenfield, PT  12/4/2024

## 2024-12-04 NOTE — PROGRESS NOTES
[facility] PHYSICAL REHABILITATION  [address] [place] [id number]     INPATIENT REHABILITATION  DAILY PROGRESS NOTE     Date: 12/4/2024    Name: Shaila Martinez Age / Sex: 44 y.o. / female   CSN: [id number] MRN: [id number]   Admit Date: 11/21/2024 Length of Stay: 13 days     Primary Rehabilitation Diagnosis: 1.  Impaired mobility and ADLs secondary right-sided weakness.    Subjective:     I personally saw and evaluated this patient face-to-face today.  Patient is sitting in bed in no apparent distress.  Earlier today had felt constipated but had a large bowel movement after an enema and now feels well and good.    Objective:     Vital Signs:  Patient Vitals for the past 24 hrs:   BP Temp Temp src Pulse Resp SpO2   12/04/24 1504 102/68 98.4 °F (36.9 °C) Oral 84 18 95 %   12/04/24 0711 107/73 98.6 °F (37 °C) Oral 85 18 94 %   12/03/24 2054 109/68 97.7 °F (36.5 °C) Oral 79 18 97 %        Physical Examination:  General:  Awake, alert  Cardiovascular:  S1S2+, RRR  Pulmonary:  CTA b/l  GI:  Soft, BS+, NT, ND  Extremities:  No edema  Right-sided weakness    Current Medications:  Current Facility-Administered Medications   Medication Dose Route Frequency    enoxaparin (LOVENOX) injection 40 mg  40 mg SubCUTAneous Daily    acetaminophen (TYLENOL) tablet 650 mg  650 mg Oral Q4H PRN    aspirin EC tablet 81 mg  81 mg Oral Daily    atorvastatin (LIPITOR) tablet 80 mg  80 mg Oral Daily    oxyCODONE-acetaminophen (PERCOCET) 5-325 MG per tablet 2 tablet  2 tablet Oral Q6H PRN    polyethylene glycol (GLYCOLAX) packet 17 g  17 g Oral Daily PRN    ondansetron (ZOFRAN) tablet 4 mg  4 mg Oral Q8H PRN       Allergies:  No Known Allergies    Lab/Data Review:  No results found for this or any previous visit (from the past 24 hour(s)).        Assessment:     Primary Rehab Diagnosis   1.  Impaired mobility and ADLs secondary right-sided weakness.  2.  Right first molar abscess associated with soft tissue swelling and

## 2024-12-05 LAB
ANION GAP SERPL CALC-SCNC: 6 MMOL/L (ref 3–18)
BASOPHILS # BLD: 0.1 K/UL (ref 0–0.1)
BASOPHILS NFR BLD: 1 % (ref 0–2)
BUN SERPL-MCNC: 7 MG/DL (ref 7–18)
BUN/CREAT SERPL: 7 (ref 12–20)
CALCIUM SERPL-MCNC: 9 MG/DL (ref 8.5–10.1)
CHLORIDE SERPL-SCNC: 107 MMOL/L (ref 100–111)
CO2 SERPL-SCNC: 24 MMOL/L (ref 21–32)
CREAT SERPL-MCNC: 0.98 MG/DL (ref 0.6–1.3)
DIFFERENTIAL METHOD BLD: ABNORMAL
EOSINOPHIL # BLD: 0.6 K/UL (ref 0–0.4)
EOSINOPHIL NFR BLD: 8 % (ref 0–5)
ERYTHROCYTE [DISTWIDTH] IN BLOOD BY AUTOMATED COUNT: 12.7 % (ref 11.6–14.5)
GLUCOSE SERPL-MCNC: 100 MG/DL (ref 74–99)
HCT VFR BLD AUTO: 37.7 % (ref 35–45)
HGB BLD-MCNC: 12.2 G/DL (ref 12–16)
IMM GRANULOCYTES # BLD AUTO: 0 K/UL (ref 0–0.04)
IMM GRANULOCYTES NFR BLD AUTO: 0 % (ref 0–0.5)
LYMPHOCYTES # BLD: 3.8 K/UL (ref 0.9–3.6)
LYMPHOCYTES NFR BLD: 52 % (ref 21–52)
MCH RBC QN AUTO: 28.4 PG (ref 24–34)
MCHC RBC AUTO-ENTMCNC: 32.4 G/DL (ref 31–37)
MCV RBC AUTO: 87.7 FL (ref 78–100)
MONOCYTES # BLD: 0.5 K/UL (ref 0.05–1.2)
MONOCYTES NFR BLD: 6 % (ref 3–10)
NEUTS SEG # BLD: 2.4 K/UL (ref 1.8–8)
NEUTS SEG NFR BLD: 32 % (ref 40–73)
NRBC # BLD: 0 K/UL (ref 0–0.01)
NRBC BLD-RTO: 0 PER 100 WBC
PLATELET # BLD AUTO: 394 K/UL (ref 135–420)
PMV BLD AUTO: 10.1 FL (ref 9.2–11.8)
POTASSIUM SERPL-SCNC: 3.7 MMOL/L (ref 3.5–5.5)
RBC # BLD AUTO: 4.3 M/UL (ref 4.2–5.3)
SODIUM SERPL-SCNC: 137 MMOL/L (ref 136–145)
WBC # BLD AUTO: 7.3 K/UL (ref 4.6–13.2)

## 2024-12-05 PROCEDURE — 36415 COLL VENOUS BLD VENIPUNCTURE: CPT

## 2024-12-05 PROCEDURE — 97542 WHEELCHAIR MNGMENT TRAINING: CPT

## 2024-12-05 PROCEDURE — 97530 THERAPEUTIC ACTIVITIES: CPT

## 2024-12-05 PROCEDURE — 1180000000 HC REHAB R&B

## 2024-12-05 PROCEDURE — 6360000002 HC RX W HCPCS: Performed by: HOSPITALIST

## 2024-12-05 PROCEDURE — 6370000000 HC RX 637 (ALT 250 FOR IP): Performed by: EMERGENCY MEDICINE

## 2024-12-05 PROCEDURE — 97112 NEUROMUSCULAR REEDUCATION: CPT

## 2024-12-05 PROCEDURE — 97110 THERAPEUTIC EXERCISES: CPT

## 2024-12-05 PROCEDURE — 97116 GAIT TRAINING THERAPY: CPT

## 2024-12-05 PROCEDURE — 99232 SBSQ HOSP IP/OBS MODERATE 35: CPT | Performed by: EMERGENCY MEDICINE

## 2024-12-05 PROCEDURE — 85025 COMPLETE CBC W/AUTO DIFF WBC: CPT

## 2024-12-05 PROCEDURE — 80048 BASIC METABOLIC PNL TOTAL CA: CPT

## 2024-12-05 PROCEDURE — 6370000000 HC RX 637 (ALT 250 FOR IP): Performed by: INTERNAL MEDICINE

## 2024-12-05 PROCEDURE — 94761 N-INVAS EAR/PLS OXIMETRY MLT: CPT

## 2024-12-05 RX ADMIN — ENOXAPARIN SODIUM 40 MG: 100 INJECTION SUBCUTANEOUS at 08:05

## 2024-12-05 RX ADMIN — ASPIRIN 81 MG: 81 TABLET, COATED ORAL at 08:05

## 2024-12-05 RX ADMIN — ATORVASTATIN CALCIUM 80 MG: 40 TABLET, FILM COATED ORAL at 08:05

## 2024-12-05 RX ADMIN — SENNOSIDES AND DOCUSATE SODIUM 2 TABLET: 50; 8.6 TABLET ORAL at 08:05

## 2024-12-05 ASSESSMENT — PAIN SCALES - GENERAL
PAINLEVEL_OUTOF10: 0

## 2024-12-05 NOTE — PLAN OF CARE
Problem: Physical Therapy - Adult  Goal: By Discharge: Performs mobility at highest level of function for planned discharge setting.  See evaluation for individualized goals.  Description: Physical Therapy Short Term Goals  Initiated 11/22/2024 and to be accomplished within 7 day(s) (11/29)  1.  Patient will supine to sit, sit to supine, roll right, and roll left  in bed with supervision.  (MET 11/29/24)  2.  Patient will transfer from bed to chair and chair to bed with contact guard assist using the least restrictive device. (MET 11/29/24)  3.  Patient will perform sit to stand with contact guard assist. (MET 11/29/24)  4.  Patient will ambulate with minimal assistance for 50 feet with the least restrictive device.  (MET 11/29/24)  5.  Patient will ascend/descend 3 stairs with bilateral handrail(s) with minimal assistance. (MET 11/29/24)    Physical Therapy Long Term Goals  Initiated 11/22/2024 and to be accomplished within 21 day(s) (12/13)  1.  Patient will supine to sit, sit to supine, roll right, and roll left in bed with modified independence.    2.  Patient will transfer from bed to chair and chair to bed with supervision/set-up using the least restrictive device.  3.  Patient will perform sit to stand with supervision/set-up.  4.  Patient will ambulate with contact guard assist for 150 feet with the least restrictive device.   Updated goal:Patient will ambulate with stand by assist/supervision for 150 feet with the least restrictive device.   5.  Patient will ascend/descend 12 stairs with bilateral handrail(s) with contact guard assist.  12/5/2024 1144 by Myrna Fang, PT  Outcome: Progressing     PHYSICAL THERAPY TREATMENT    Patient: Shaila Martinez (44 y.o. female)  Date: 12/5/2024  Diagnosis: Right sided weakness [R53.1]   Precautions: falls risk  Chart, physical therapy assessment, plan of care and goals were reviewed.    Time in: 1030  Time out : 1202    Patient seen for: gait training, balance  continues to have decreased balance and decreased support from LE's resulting in pt continuing to need RW device with B UE support. Pt participated in some B UE therex today to help strengthen Ue's to assist with transfers and gait.   Progression toward goals:  []      Improving appropriately and progressing toward goals  [x]      Improving slowly and progressing toward goals  []      Not making progress toward goals and plan of care will be adjusted      PLAN:  Patient continues to benefit from skilled intervention to address the above impairments.  Continue treatment per established plan of care.  Discharge Recommendations:  24 hour supervision or assist;Home with Home health PT  Further Equipment Recommendations for Discharge:        Rolling walker             Estimated Discharge Date: 12/7/24    Activity Tolerance:   Fair +; pt gets fatigued after extended session of PT; no c/o pain this session  Please refer to the flowsheet for vital signs taken during this treatment.    After treatment:   [] Patient left in no apparent distress in bed  [x] Patient left in no apparent distress sitting up in chair  [] Patient left in no apparent distress in w/c mobilizing under own power  [] Patient left in no apparent distress dining area  [] Patient left in no apparent distress mobilizing under own power  [x] Call bell left within reach  [] Nursing notified  [] Caregiver present  [] Bed alarm activated   [x] Chair alarm activated        REGINA MCCURDY, PT  12/5/2024

## 2024-12-05 NOTE — PLAN OF CARE
Problem: Safety - Adult  Goal: Free from fall injury  12/5/2024 0737 by Rambo Vazquez LPN  Outcome: Progressing  12/5/2024 0138 by Kriss Kidd, RN  Outcome: Progressing  Flowsheets (Taken 12/4/2024 1943)  Free From Fall Injury: Instruct family/caregiver on patient safety

## 2024-12-05 NOTE — PROGRESS NOTES
4 Eyes Skin Assessment     NAME:  Shaila Martinez  YOB: 1980  MEDICAL RECORD NUMBER:  [id number]    The patient is being assessed for  Shift Handoff    I agree that at least one RN has performed a thorough Head to Toe Skin Assessment on the patient. ALL assessment sites listed below have been assessed.      Areas assessed by both nurses:    Arms, Elbows, Hands, Sacrum. Buttock, Coccyx, Ischium, and Legs. Feet and Heels        Does the Patient have a Wound? No noted wound(s)       Kwan Prevention initiated by RN: Yes  Wound Care Orders initiated by RN: No    Pressure Injury (Stage 3,4, Unstageable, DTI, NWPT, and Complex wounds) if present, place Wound referral order by RN under : No    New Ostomies, if present place, Ostomy referral order under : No     Nurse 1 eSignature: Electronically signed by RICK LI LPN on 12/5/24 at 6:21 PM EST    **SHARE this note so that the co-signing nurse can place an eSignature**    Nurse 2 eSignature: Electronically signed by Kriss Kidd RN on 12/5/24 at 9:51 PM EST

## 2024-12-05 NOTE — PROGRESS NOTES
[facility] [place] PHYSICAL REHABILITATION  [address] [place] [id number]     INPATIENT REHABILITATION  DAILY PROGRESS NOTE     Date: 12/5/2024    Name: Shaila Martinez Age / Sex: 44 y.o. / female   CSN: [id number] MRN: [id number]   Admit Date: 11/21/2024 Length of Stay: 14 days     Primary Rehabilitation Diagnosis: 1.  Impaired mobility and ADLs secondary right-sided weakness.    Subjective:     I personally saw and evaluated this patient face-to-face today.  Patient is sitting in bed in no apparent distress.  Family is at bedside.  Patient is scheduled for discharge tomorrow.  I discussed discharge plans with the patient    Objective:     Vital Signs:  Patient Vitals for the past 24 hrs:   BP Temp Temp src Pulse Resp SpO2 Height   12/05/24 1517 102/68 98.2 °F (36.8 °C) Oral 79 18 98 % --   12/05/24 1318 -- -- -- -- -- -- 1.727 m (5' 7.99\")   12/05/24 0714 107/75 98.3 °F (36.8 °C) Oral 85 18 95 % --   12/04/24 1944 106/71 98 °F (36.7 °C) Oral 83 20 97 % --        Physical Examination:  General:  Awake, alert  Cardiovascular:  S1S2+, RRR  Pulmonary:  CTA b/l  GI:  Soft, BS+, NT, ND  Extremities:  No edema  Right-sided weakness    Current Medications:  Current Facility-Administered Medications   Medication Dose Route Frequency    sennosides-docusate sodium (SENOKOT-S) 8.6-50 MG tablet 2 tablet  2 tablet Oral Daily    enoxaparin (LOVENOX) injection 40 mg  40 mg SubCUTAneous Daily    acetaminophen (TYLENOL) tablet 650 mg  650 mg Oral Q4H PRN    aspirin EC tablet 81 mg  81 mg Oral Daily    atorvastatin (LIPITOR) tablet 80 mg  80 mg Oral Daily    oxyCODONE-acetaminophen (PERCOCET) 5-325 MG per tablet 2 tablet  2 tablet Oral Q6H PRN    polyethylene glycol (GLYCOLAX) packet 17 g  17 g Oral Daily PRN    ondansetron (ZOFRAN) tablet 4 mg  4 mg Oral Q8H PRN       Allergies:  No Known Allergies    Lab/Data Review:  Recent Results (from the past 24 hour(s))   Basic Metabolic Panel    Collection Time: 12/05/24  4:28 AM

## 2024-12-05 NOTE — PROGRESS NOTES
Comprehensive Nutrition Assessment    Type and Reason for Visit:  Initial, LOS    Nutrition Recommendations/Plan:   Continue Regular diet     Malnutrition Assessment:  Malnutrition Status: No indicators at this time    Nutrition History and Allergies:   Seizure disorder; [facility]    Nutrition Assessment:    Per H&P, pt admitted to rehab 11/21 s/p hospitalization from November 18-21 due to R sided weakness and facil swelling. Negative for stroke. Neurology consulted, MRI negative. ID was consulted due to R first molar dental abscess with soft tissue swelling and cellulitis to the R lower face, put on antibiotics. Pt will need to see a dentist after abx. Pt was sent to rehab due to PT and OT eval for recovery of strength, function and mobility.    Nutrition Related Findings:    LBM 12/4, missing teeth trace facial edema Wound Type: None       Current Nutrition Intake & Therapies:    Average Meal Intake: %  Average Supplements Intake: None Ordered  ADULT DIET; Regular    Anthropometric Measures:  Height: 172.7 cm (5' 7.99\")  Ideal Body Weight (IBW): 140 lbs (64 kg)    Admission Body Weight: 89.8 kg (197 lb 15.6 oz)  Current Body Weight: 89.8 kg (197 lb 15.6 oz), 141.4 % IBW. Weight Source: Bed scale  Current BMI (kg/m2): 30.1  Usual Body Weight: 81.6 kg (180 lb)     % Weight Change (Calculated): 10  Weight Adjustment For: No Adjustment                 BMI Categories: Obese Class 1 (BMI 30.0-34.9)    Estimated Daily Nutrient Needs:  Energy Requirements Based On: Formula  Weight Used for Energy Requirements: Current  Energy (kcal/day): 0781-1597 kcal (MSJ x 1.1-1.3)  Weight Used for Protein Requirements: Current  Protein (g/day):  g (1-1.2 g/kg)  Method Used for Fluid Requirements: 1 ml/kcal  Fluid (ml/day): 0176-1573 mL or per MD    Nutrition Diagnosis:   No nutrition diagnosis at this time related to   as evidenced by      Nutrition Interventions:   Food and/or Nutrient Delivery: Continue Current

## 2024-12-05 NOTE — PLAN OF CARE
Problem: Safety - Adult  Goal: Free from fall injury  Outcome: Progressing  Flowsheets (Taken 12/4/2024 1943)  Free From Fall Injury: Instruct family/caregiver on patient safety     Problem: ABCDS Injury Assessment  Goal: Absence of physical injury  Outcome: Progressing  Flowsheets (Taken 12/4/2024 1943)  Absence of Physical Injury: Implement safety measures based on patient assessment     Problem: Neurosensory - Adult  Goal: Achieves stable or improved neurological status  Outcome: Progressing  Flowsheets (Taken 12/4/2024 1943)  Achieves stable or improved neurological status: Assess for and report changes in neurological status     Problem: Musculoskeletal - Adult  Goal: Return mobility to safest level of function  Outcome: Progressing  Flowsheets (Taken 12/4/2024 1943)  Return Mobility to Safest Level of Function:   Assist with transfers and ambulation using safe patient handling equipment as needed   Assess patient stability and activity tolerance for standing, transferring and ambulating with or without assistive devices   Instruct patient/family in ordered activity level     Problem: Musculoskeletal - Adult  Goal: Return ADL status to a safe level of function  Outcome: Progressing  Flowsheets (Taken 12/4/2024 1943)  Return ADL Status to a Safe Level of Function:   Assess activities of daily living deficits and provide assistive devices as needed   Assist and instruct patient to increase activity and self care as tolerated     Problem: Skin/Tissue Integrity - Adult  Goal: Skin integrity remains intact  Outcome: Progressing  Flowsheets (Taken 12/4/2024 1943)  Skin Integrity Remains Intact: Monitor for areas of redness and/or skin breakdown     Problem: Gastrointestinal - Adult  Goal: Maintains or returns to baseline bowel function  Outcome: Progressing  Flowsheets (Taken 12/4/2024 1943)  Maintains or returns to baseline bowel function:   Assess bowel function   Administer ordered medications as needed

## 2024-12-05 NOTE — PROGRESS NOTES
4 Eyes Skin Assessment     NAME:  Shaila Martinez  YOB: 1980  MEDICAL RECORD NUMBER:  [id number]    The patient is being assessed for  Shift Handoff    I agree that at least one RN has performed a thorough Head to Toe Skin Assessment on the patient. ALL assessment sites listed below have been assessed.      Areas assessed by both nurses:    Shoulders, Back, Chest, Sacrum. Buttock, Coccyx, Ischium, and Legs. Feet and Heels        Does the Patient have a Wound? No noted wound(s)       Kwan Prevention initiated by RN: Yes  Wound Care Orders initiated by RN: No    Pressure Injury (Stage 3,4, Unstageable, DTI, NWPT, and Complex wounds) if present, place Wound referral order by RN under : No    New Ostomies, if present place, Ostomy referral order under : No     Nurse 1 eSignature: Electronically signed by Kriss Kidd RN on 12/5/24 at 5:15 AM EST    **SHARE this note so that the co-signing nurse can place an eSignature**    Nurse 2 eSignature: Electronically signed by RICK LI LPN on 12/5/24 at 6:21 PM EST

## 2024-12-05 NOTE — PLAN OF CARE
Problem: Physical Therapy - Adult  Goal: By Discharge: Performs mobility at highest level of function for planned discharge setting.  See evaluation for individualized goals.  Description: Physical Therapy Short Term Goals  Initiated 11/22/2024 and to be accomplished within 7 day(s) (11/29)  1.  Patient will supine to sit, sit to supine, roll right, and roll left  in bed with supervision.  (MET 11/29/24)  2.  Patient will transfer from bed to chair and chair to bed with contact guard assist using the least restrictive device. (MET 11/29/24)  3.  Patient will perform sit to stand with contact guard assist. (MET 11/29/24)  4.  Patient will ambulate with minimal assistance for 50 feet with the least restrictive device.  (MET 11/29/24)  5.  Patient will ascend/descend 3 stairs with bilateral handrail(s) with minimal assistance. (MET 11/29/24)    Physical Therapy Long Term Goals  Initiated 11/22/2024 and to be accomplished within 21 day(s) (12/13)  1.  Patient will supine to sit, sit to supine, roll right, and roll left in bed with modified independence.    2.  Patient will transfer from bed to chair and chair to bed with supervision/set-up using the least restrictive device.  3.  Patient will perform sit to stand with supervision/set-up.  4.  Patient will ambulate with contact guard assist for 150 feet with the least restrictive device.   Updated goal:Patient will ambulate with stand by assist/supervision for 150 feet with the least restrictive device.   5.  Patient will ascend/descend 12 stairs with bilateral handrail(s) with contact guard assist.  Outcome: Progressing     PHYSICAL THERAPY TREATMENT    Patient: Shaila Martinez (44 y.o. female)  Date: 12/5/2024  Diagnosis: Right sided weakness [R53.1]   Precautions: fall risk  Chart, physical therapy assessment, plan of care and goals were reviewed.    Time in: 930  Time out : 1030  Time in: 1330  Time out: 1400    Patient seen for: gait training, transfer training,        ASSESSMENT:  Patient tolerated today's sessions well and continues to make good progress towards goals.  Patient is ambulating increased distances modified independently.  Patient demonstrated decreased endurance and fatigue reported by the end of today's sessions.   Progression toward goals:  [x]      Improving appropriately and progressing toward goals  []      Improving slowly and progressing toward goals  []      Not making progress toward goals and plan of care will be adjusted      PLAN:  Patient continues to benefit from skilled intervention to address the above impairments.  Continue treatment per established plan of care.  Discharge Recommendations:  24 hour supervision or assist;Home with Home health PT  Further Equipment Recommendations for Discharge:        Rolling             Estimated Discharge Date:12/7/24    Activity Tolerance:   Fair+  Please refer to the flowsheet for vital signs taken during this treatment.    After treatment:   [] Patient left in no apparent distress in bed  [x] Patient left in no apparent distress sitting up in chair  [] Patient left in no apparent distress in w/c mobilizing under own power  [] Patient left in no apparent distress dining area  [] Patient left in no apparent distress mobilizing under own power  [x] Call bell left within reach  [] Nursing notified  [x] Caregiver present (family members)  [] Bed alarm activated   [x] Chair alarm activated        Robyn Pérez, ANDI  12/5/2024

## 2024-12-06 VITALS
DIASTOLIC BLOOD PRESSURE: 70 MMHG | BODY MASS INDEX: 30.01 KG/M2 | TEMPERATURE: 97 F | OXYGEN SATURATION: 99 % | RESPIRATION RATE: 16 BRPM | HEIGHT: 68 IN | HEART RATE: 80 BPM | WEIGHT: 198 LBS | SYSTOLIC BLOOD PRESSURE: 102 MMHG

## 2024-12-06 PROCEDURE — 97110 THERAPEUTIC EXERCISES: CPT

## 2024-12-06 PROCEDURE — 97530 THERAPEUTIC ACTIVITIES: CPT

## 2024-12-06 PROCEDURE — 99239 HOSP IP/OBS DSCHRG MGMT >30: CPT | Performed by: NURSE PRACTITIONER

## 2024-12-06 PROCEDURE — 6370000000 HC RX 637 (ALT 250 FOR IP): Performed by: EMERGENCY MEDICINE

## 2024-12-06 PROCEDURE — 97116 GAIT TRAINING THERAPY: CPT

## 2024-12-06 PROCEDURE — 6370000000 HC RX 637 (ALT 250 FOR IP): Performed by: INTERNAL MEDICINE

## 2024-12-06 PROCEDURE — 97535 SELF CARE MNGMENT TRAINING: CPT

## 2024-12-06 PROCEDURE — 6360000002 HC RX W HCPCS: Performed by: HOSPITALIST

## 2024-12-06 RX ORDER — ATORVASTATIN CALCIUM 80 MG/1
80 TABLET, FILM COATED ORAL DAILY
Qty: 30 TABLET | Refills: 0 | Status: SHIPPED | OUTPATIENT
Start: 2024-12-06

## 2024-12-06 RX ORDER — ACETAMINOPHEN 325 MG/1
650 TABLET ORAL EVERY 4 HOURS PRN
Qty: 20 TABLET | Refills: 0
Start: 2024-12-06

## 2024-12-06 RX ORDER — OXYCODONE AND ACETAMINOPHEN 5; 325 MG/1; MG/1
1 TABLET ORAL EVERY 6 HOURS PRN
Qty: 8 TABLET | Refills: 0 | Status: SHIPPED | OUTPATIENT
Start: 2024-12-06 | End: 2024-12-09

## 2024-12-06 RX ORDER — SENNA AND DOCUSATE SODIUM 50; 8.6 MG/1; MG/1
2 TABLET, FILM COATED ORAL DAILY
Qty: 10 TABLET | Refills: 0 | Status: SHIPPED | OUTPATIENT
Start: 2024-12-07

## 2024-12-06 RX ORDER — ASPIRIN 81 MG/1
81 TABLET ORAL DAILY
Qty: 30 TABLET | Refills: 0 | Status: SHIPPED | OUTPATIENT
Start: 2024-12-07

## 2024-12-06 RX ORDER — POLYETHYLENE GLYCOL 3350 17 G/17G
17 POWDER, FOR SOLUTION ORAL DAILY PRN
Qty: 10 EACH | Refills: 0
Start: 2024-12-06

## 2024-12-06 RX ADMIN — ENOXAPARIN SODIUM 40 MG: 100 INJECTION SUBCUTANEOUS at 08:23

## 2024-12-06 RX ADMIN — SENNOSIDES AND DOCUSATE SODIUM 2 TABLET: 50; 8.6 TABLET ORAL at 08:24

## 2024-12-06 RX ADMIN — ASPIRIN 81 MG: 81 TABLET, COATED ORAL at 08:24

## 2024-12-06 RX ADMIN — ATORVASTATIN CALCIUM 80 MG: 40 TABLET, FILM COATED ORAL at 08:24

## 2024-12-06 ASSESSMENT — PAIN SCALES - GENERAL
PAINLEVEL_OUTOF10: 0

## 2024-12-06 NOTE — PLAN OF CARE
toilet   Toilet Transfer Comments Stand step xfer (Min/ Mod A) w/c <-> extra wide platform commode over toilet; gait belt. Use of grab bar. Stand step xfer performed (SBA) using FWW (gait belt) to from/ standard toilet.     WHEELCHAIR/BED TRANSFER INDEPENDENCE Initial Assessment Discharge Assessment 12/6/2024   Transfer Technique   Stand step Stand step   Level of Assistance Contact guard assistance Supervision   Equipment Wheelchair (FWW; gait belt) Bed;Wheelchair   Comments Stand step xfer (CGA) using FWW; gait belt. VC's for hand placement, positioning, and safe technique. Stand step xfer (supv) use of bed rail; gait belt. VC's for hand placement, positioning, and safe technique.     Skin Integrity: see nursing assessment    Therapeutic Exercise:  RUE/ LUE yellow theraband exercises (15 reps x2 sets) for    Activity Tolerance:   Patient Tolerated treatment well   PRN rest breaks   Please refer to the flowsheet for vital signs taken during this treatment.     EDUCATION:   Education Given To: Patient  Education Provided: Mobility Training;Transfer Training;Fall Prevention Strategies;Safety;Energy Conservation;Home Exercise Program;ADL Function;DME/Home Modifications  Education Method: Demonstration;Verbal;Teach Back  Barriers to Learning: None  Education Outcome: Verbalized understanding;Demonstrated understanding    ASSESSMENT:  Patient improving and progressing toward goals having met 9/10 LTG's and 9/9 STG's during pt's inpatient rehab stay. Therapist reassessed goals based on pt's current functional ability and demonstrated performance. Pt participated in ADL shower using tub transfer bench.     Patient will benefit from home occupational therapy to further address decreased (I) with ADL's, decreased (I) with LB self-cares, impaired RUE AROM, impaired RUE FMC, RUE hemiparesis/ non-functional use, impaired RUE strength, impaired dynamic sitting balance, impaired static/ dynamic standing balance, and impaired  functional transfers/ mobility limiting pt performance, independence and safety with ADL/IADL's and functional mobility for return to prior level of functioning.   Progression toward goals:  [x]      Improving appropriately and progressing toward goals  []      Improving slowly and progressing toward goals  []      Not making progress toward goals and plan of care will be adjusted     PLAN:  Pt would benefit from continued skilled occupational therapy in order to improve ADL function and IADL with use of least restrictive device. Interventions may include range of motion (AROM, PROM B UE/trunk), motor function (B UE/trunk strengthening/coordination), activity tolerance (vitals, oxygen saturation levels), ADL, balance activities, IADL, and functional transfer training.   Rationale for discharge:  [x] Goals Achieved (pt met 9/10 LTG's; 9/9 STG's)  [] Plateau Reached  [] Patient not participating in therapy  [] Other:  Discharge Recommendations:   Home occupational therapy with 24 hr assist  Further Equipment Recommendations for Discharge:  tub transfer bench        Please refer to the flow sheet for vital signs taken during this treatment.  After treatment:   [x]  Patient left in no apparent distress sitting up in chair  []  Patient left in no apparent distress in bed  []  Patient handoff to SLP/PT  [x]  Call bell left within reach  [x]  Nursing notified  []  Caregiver present  [x]  alarm activated    COMMUNICATION/EDUCATION:   [x] Home safety education was provided and the patient/caregiver indicated understanding.  [x] Patient/family have participated as able in goal setting and plan of care.  [x] Patient/family agree to work toward stated goals and plan of care.  [] Patient understands intent and goals of therapy, but is neutral about his/her participation.  [] Patient is unable to participate in goal setting and plan of care.    IRF-BEULAH Completed: yes  Entered Differentiated Treatment minutes: yes    Silvia Sanders

## 2024-12-06 NOTE — PROGRESS NOTES
The patient has completed stay in acute rehab and is set to discharge today. She is cleared by MD and therapy to transition back to Home.    The patient has been provided with general education regarding prescribed medications.  Key points were made about purpose/dosage, administration instructions, side effects, and importance of adherence.  Patient Pharmacy (as listed).   Patient has been instructed about scheduled appointments if any was made, with a phone number of contact.    The patient has been provided with written and verbal discharge instructions.  Patient discharge: understanding of instructions given. Patient also educated on access to MyChart if patient wishes to use.

## 2024-12-06 NOTE — PROGRESS NOTES
Prior to discharge, nurse practitioner discussed and went through current and discharge medications in detail with the patient at the bedside. The NP discussed which drugs to discontinue, resume, and continue after discharge. NP explained and answered questions about follow-up care/pcp/specialist appointments, as well as discharge process expectations. The patient expressed her understanding verbally. Patient will be discharged with home health, skilled nursing, PT/OT/wound care. NP discussed discharge process with discharge RN.     Chacha Kerr DNP, NP-BC

## 2024-12-06 NOTE — PROGRESS NOTES
Pt to dc home today. Pt arranged with [facility] per [facility]. Sw delivered a rolling walker and tub transfer bench from [employer].     No further needs are noted at this time.

## 2024-12-06 NOTE — DISCHARGE INSTRUCTIONS
------------------------------------------------------------------------------------------------------------    DISCHARGE INSTRUCTIONS    1. Make sure that when you request refills at the pharmacy that the refill requests are sent to your PCP (NOT to the prescriber at the [facility]) to avoid any delays in getting your medication refills. The physician at the [facility] will not able to order medications or refills after discharge -- Please understand that though we would like to help, it is simply not safe for our physician to order you a medication that we cannot monitor.     2. If any of the prescribed medications require a PRIOR AUTHORIZATION, contact your Primary Care Physician or specialist to EITHER complete prior authorizations and paperwork on your behalf OR prescribe an alternative medication. -- Please understand that though we would like to help, it is simply not safe for our physician to order you a medication that we cannot monitor.     3. If any of your prescriptions medications PRIOR TO ADMISSION TO THE HOSPITAL needs a refill (and either the dosage, frequency or instructions was not changed), kindly contact your Primary Care Physician for refills.    -------------------------------------------------------------------------------------------------------------------

## 2024-12-06 NOTE — DISCHARGE SUMMARY
[facility]  [address] [place] [id number]     INPATIENT REHABILITATION  DISCHARGE SUMMARY    Name: Shaila Martinez MRN: [id number]   Age / Sex: 44 y.o. / female CSN: [id number]   YOB: 1980 Length of Stay: 15 days   Admit Date: 11/21/2024 Discharge Date: 12/06/2024       PRIMARY CARE PHYSICIAN: Ricardo Perez DNP    DISCHARGE DIAGNOSES:    Primary Rehabilitation Diagnosis: Impaired mobility and ADLs secondary right-sided weakness.    CONSULTS CALLED: None    PROCEDURES DONE: None    BRIEF HISTORY: (from the history and physical completed by Dr. Nolberto Florez MD)   The patient is a 44-year-old female with history of seizure disorder who was admitted to [facility] on November 18, 2024 due to right-sided weakness and right facial swelling.  Patient initially presented to emergency room with complaint of right lower jaw pain and was found to have phlegmon suspected from dental infection.During evaluation was noted to have weakness in the right upper and lower extremity along with right facial paresthesia. Given such code stroke initiated in the emergency room and patient worked up further. CT and CTA head and neck were negative overall. No other significant findings noted on laboratory workup. Given such patient was started on antibiotics. Given this facility does not have OMFS, ENT was consulted who stated they did not have any procedure that they felt they would be able to do with this patient. [facility] and [facility] Were both consulted, reviewed the case and stated that did not feel as though they would need to do any type of OMFS procedure and so declined transfer. Infectious disease was consulted on our service and patient admitted for CVA workup.  Patient was seen by neurologist.  Patient had MRI of brain with contrast that was negative for acute finding to explain her symptoms.  Neurologist did not recommend further

## 2024-12-06 NOTE — PLAN OF CARE
Problem: Occupational Therapy - Adult  Goal: By Discharge: Performs self-care activities at highest level of function for planned discharge setting.  See evaluation for individualized goals.  Description: Occupational Therapy Goals   Long Term Goals  Initiated (11/22/2024) and to be accomplished within 3 week(s) (12/6/2024)  1. Pt will perform self-feeding with Mod I. (Goal met 12/4/2024)  2. Pt will perform grooming with supv. (Goal met 12/4/2024)  3. Pt will perform UB bathing with supv. (Goal met 12/4/2024)  Goal upgraded: Pt will perform UB bathing with set-up. (Goal met 12/6/2024)  4. Pt will perform LB bathing with CGA using AE as needed. (Goal met 12/6/2024)  5. Pt will perform tub/shower transfer with CGA. (Goal met 12/6/2024)  6. Pt will perform UB dressing with supv. (Goal met 12/4/2024)  Goal upgraded: Pt will perform UB dressing with set-up. (Goal met 12/6/2024)  7. Pt will perform LB dressing with CGA using AE as needed. (Goal met 12/6/2024)  8. Pt will perform toileting task with CGA. (Goal met 12/6/2024)  9. Pt will perform toilet transfer with CGA using least restrictive assistive device. (Goal met 12/6/2024)  10. Pt will perform IADL laundry task with CGA. (Goal not met 12/6/2024; pt requires CGA/ Min A for washing load of laundry in top loading clothes washer)    Short Term Goals   Initiated (11/22/2024) and to be accomplished within 7 day(s); (updated on 11/27/2024)  1. Pt will perform self-feeding with set-up. ( 11/27/24)  2. Pt will perform grooming with Min A. ( 11/27/24)  3. Pt will perform UB bathing with Min A.  ( 11/27/24)  4. Pt will perform LB bathing with Mod A using AE as needed. ( 11/27/24)  5. Pt will perform tub/shower transfer with Min A. ( 11/27/24)  6. Pt will perform UB dressing with SBA/ Min A. ( 11/27/24)  7. Pt will perform LB dressing with Mod A using AE as needed. ( 11/27/24)  8. Pt will perform toileting task with Min A. ( 11/27/24)  9. Pt will perform  breakdown  2.  Assess vascular access sites hourly  3.  Every 4-6 hours minimum:  Change oxygen saturation probe site  4.  Every 4-6 hours:  If on nasal continuous positive airway pressure, respiratory therapy assess nares and determine need for appliance change or resting period.  12/6/2024 1206 by Batool Rosen RN  Outcome: Completed  12/6/2024 0043 by Kriss Kidd RN  Outcome: Progressing     Problem: Skin/Tissue Integrity - Adult  Goal: Skin integrity remains intact  12/6/2024 1206 by Batolo Rosen RN  Outcome: Completed  12/6/2024 0043 by Kriss Kidd RN  Outcome: Progressing  Flowsheets (Taken 12/5/2024 1930)  Skin Integrity Remains Intact: Monitor for areas of redness and/or skin breakdown     Problem: Gastrointestinal - Adult  Goal: Minimal or absence of nausea and vomiting  Outcome: Completed  Goal: Maintains or returns to baseline bowel function  12/6/2024 1206 by Batool Rosen RN  Outcome: Completed  12/6/2024 0043 by Kriss Kidd RN  Outcome: Progressing  Flowsheets (Taken 12/5/2024 1930)  Maintains or returns to baseline bowel function: Assess bowel function  Goal: Maintains adequate nutritional intake  12/6/2024 1206 by Batool Rosen RN  Outcome: Completed  12/6/2024 0043 by Kriss iKdd RN  Outcome: Adequate for Discharge     Problem: Infection - Adult  Goal: Absence of infection at discharge  12/6/2024 1206 by Batool Rosen RN  Outcome: Completed  12/6/2024 0043 by Kriss Kidd RN  Outcome: Progressing  Flowsheets (Taken 12/5/2024 1930)  Absence of infection at discharge:   Assess and monitor for signs and symptoms of infection   Instruct and encourage patient and family to use good hand hygiene technique  Goal: Absence of infection during hospitalization  12/6/2024 1206 by Batool Rosen RN  Outcome: Completed  12/6/2024 0043 by Kriss Kidd RN  Outcome: Progressing  Flowsheets (Taken 12/5/2024 1930)  Absence of infection

## 2024-12-06 NOTE — PLAN OF CARE
Problem: Safety - Adult  Goal: Free from fall injury  Outcome: Progressing  Flowsheets (Taken 12/5/2024 1930)  Free From Fall Injury: Instruct family/caregiver on patient safety     Problem: ABCDS Injury Assessment  Goal: Absence of physical injury  Outcome: Progressing  Flowsheets (Taken 12/5/2024 1930)  Absence of Physical Injury: Implement safety measures based on patient assessment     Problem: Neurosensory - Adult  Goal: Achieves stable or improved neurological status  Outcome: Progressing  Flowsheets (Taken 12/5/2024 1930)  Achieves stable or improved neurological status: Assess for and report changes in neurological status     Problem: Musculoskeletal - Adult  Goal: Return mobility to safest level of function  Outcome: Progressing  Flowsheets (Taken 12/5/2024 1930)  Return Mobility to Safest Level of Function:   Assess patient stability and activity tolerance for standing, transferring and ambulating with or without assistive devices   Assist with transfers and ambulation using safe patient handling equipment as needed   Instruct patient/family in ordered activity level     Problem: Musculoskeletal - Adult  Goal: Return ADL status to a safe level of function  Outcome: Progressing  Flowsheets (Taken 12/5/2024 1930)  Return ADL Status to a Safe Level of Function:   Administer medication as ordered   Assist and instruct patient to increase activity and self care as tolerated     Problem: Skin/Tissue Integrity  Goal: Absence of new skin breakdown  Description: 1.  Monitor for areas of redness and/or skin breakdown  2.  Assess vascular access sites hourly  3.  Every 4-6 hours minimum:  Change oxygen saturation probe site  4.  Every 4-6 hours:  If on nasal continuous positive airway pressure, respiratory therapy assess nares and determine need for appliance change or resting period.  Outcome: Progressing     Problem: Skin/Tissue Integrity - Adult  Goal: Skin integrity remains intact  Outcome:

## 2024-12-06 NOTE — PROGRESS NOTES
4 Eyes Skin Assessment     NAME:  Shaila Martinez  YOB: 1980  MEDICAL RECORD NUMBER:  [id number]    The patient is being assessed for  Shift Handoff    I agree that at least one RN has performed a thorough Head to Toe Skin Assessment on the patient. ALL assessment sites listed below have been assessed.      Areas assessed by both nurses:    Shoulders, Back, Chest, Sacrum. Buttock, Coccyx, Ischium, and Legs. Feet and Heels        Does the Patient have a Wound? No noted wound(s)       Kwan Prevention initiated by RN: Yes  Wound Care Orders initiated by RN: No    Pressure Injury (Stage 3,4, Unstageable, DTI, NWPT, and Complex wounds) if present, place Wound referral order by RN under : No    New Ostomies, if present place, Ostomy referral order under : No     Nurse 1 eSignature: Electronically signed by Kriss Kidd RN on 12/6/24 at 5:06 AM EST    **SHARE this note so that the co-signing nurse can place an eSignature**    Nurse 2 eSignature: Electronically signed by Batool Rosen RN on 12/6/24 at 2:06 PM EST

## 2024-12-17 ENCOUNTER — HOSPITAL ENCOUNTER (OUTPATIENT)
Facility: HOSPITAL | Age: 44
Setting detail: SPECIMEN
Discharge: HOME | End: 2024-12-20

## 2024-12-17 LAB — SENTARA SPECIMEN COLLECTION: NORMAL

## 2024-12-17 PROCEDURE — 99001 SPECIMEN HANDLING PT-LAB: CPT

## 2025-02-05 ENCOUNTER — HOSPITAL ENCOUNTER (OUTPATIENT)
Facility: HOSPITAL | Age: 45
Discharge: HOME | End: 2025-02-08
Payer: MEDICAID

## 2025-02-05 DIAGNOSIS — M25.511 CHRONIC RIGHT SHOULDER PAIN: ICD-10-CM

## 2025-02-05 DIAGNOSIS — G89.29 CHRONIC RIGHT SHOULDER PAIN: ICD-10-CM

## 2025-02-05 PROCEDURE — 73030 X-RAY EXAM OF SHOULDER: CPT
